# Patient Record
Sex: FEMALE | Race: WHITE | Employment: OTHER | URBAN - NONMETROPOLITAN AREA
[De-identification: names, ages, dates, MRNs, and addresses within clinical notes are randomized per-mention and may not be internally consistent; named-entity substitution may affect disease eponyms.]

---

## 2017-01-11 ENCOUNTER — ANTI-COAG VISIT (OUTPATIENT)
Dept: CARDIOLOGY | Age: 57
End: 2017-01-11
Payer: MEDICARE

## 2017-01-11 DIAGNOSIS — I48.0 PAROXYSMAL ATRIAL FIBRILLATION (HCC): Primary | ICD-10-CM

## 2017-01-11 LAB
INTERNATIONAL NORMALIZATION RATIO, POC: 3.2
PROTHROMBIN TIME, POC: NORMAL

## 2017-01-11 PROCEDURE — 85610 PROTHROMBIN TIME: CPT | Performed by: CLINICAL NURSE SPECIALIST

## 2017-02-23 ENCOUNTER — ANTI-COAG VISIT (OUTPATIENT)
Dept: CARDIOLOGY | Age: 57
End: 2017-02-23
Payer: MEDICARE

## 2017-02-23 DIAGNOSIS — I48.0 PAROXYSMAL ATRIAL FIBRILLATION (HCC): Primary | ICD-10-CM

## 2017-02-23 LAB
INTERNATIONAL NORMALIZATION RATIO, POC: 2.5
PROTHROMBIN TIME, POC: NORMAL

## 2017-02-23 PROCEDURE — 85610 PROTHROMBIN TIME: CPT | Performed by: NURSE PRACTITIONER

## 2017-02-23 PROCEDURE — 1036F TOBACCO NON-USER: CPT | Performed by: NURSE PRACTITIONER

## 2017-04-06 ENCOUNTER — ANTI-COAG VISIT (OUTPATIENT)
Dept: CARDIOLOGY | Age: 57
End: 2017-04-06
Payer: MEDICARE

## 2017-04-06 DIAGNOSIS — I48.0 PAROXYSMAL ATRIAL FIBRILLATION (HCC): Primary | ICD-10-CM

## 2017-04-06 LAB
INTERNATIONAL NORMALIZATION RATIO, POC: 4
PROTHROMBIN TIME, POC: NORMAL

## 2017-04-06 PROCEDURE — 1036F TOBACCO NON-USER: CPT | Performed by: CLINICAL NURSE SPECIALIST

## 2017-04-06 PROCEDURE — 85610 PROTHROMBIN TIME: CPT | Performed by: CLINICAL NURSE SPECIALIST

## 2017-04-20 ENCOUNTER — ANTI-COAG VISIT (OUTPATIENT)
Dept: CARDIOLOGY | Age: 57
End: 2017-04-20
Payer: MEDICARE

## 2017-04-20 DIAGNOSIS — I48.0 PAROXYSMAL ATRIAL FIBRILLATION (HCC): Primary | ICD-10-CM

## 2017-04-20 LAB
INTERNATIONAL NORMALIZATION RATIO, POC: 2.1
PROTHROMBIN TIME, POC: NORMAL

## 2017-04-20 PROCEDURE — 85610 PROTHROMBIN TIME: CPT | Performed by: CLINICAL NURSE SPECIALIST

## 2017-04-20 PROCEDURE — 1036F TOBACCO NON-USER: CPT | Performed by: CLINICAL NURSE SPECIALIST

## 2017-05-19 ENCOUNTER — OFFICE VISIT (OUTPATIENT)
Dept: RADIATION ONCOLOGY | Facility: HOSPITAL | Age: 57
End: 2017-05-19

## 2017-05-19 VITALS
WEIGHT: 277 LBS | BODY MASS INDEX: 39.65 KG/M2 | SYSTOLIC BLOOD PRESSURE: 132 MMHG | DIASTOLIC BLOOD PRESSURE: 80 MMHG | HEIGHT: 70 IN

## 2017-05-19 DIAGNOSIS — Z92.3 S/P RADIATION THERAPY: ICD-10-CM

## 2017-05-19 DIAGNOSIS — C34.92 SMALL CELL LUNG CANCER, LEFT (HCC): Primary | ICD-10-CM

## 2017-05-22 ENCOUNTER — OFFICE VISIT (OUTPATIENT)
Dept: CARDIOLOGY | Age: 57
End: 2017-05-22
Payer: MEDICARE

## 2017-05-22 VITALS
HEIGHT: 70 IN | OXYGEN SATURATION: 96 % | DIASTOLIC BLOOD PRESSURE: 84 MMHG | WEIGHT: 278.13 LBS | SYSTOLIC BLOOD PRESSURE: 138 MMHG | HEART RATE: 62 BPM | BODY MASS INDEX: 39.82 KG/M2

## 2017-05-22 DIAGNOSIS — I48.0 PAROXYSMAL ATRIAL FIBRILLATION (HCC): Primary | ICD-10-CM

## 2017-05-22 DIAGNOSIS — I10 ESSENTIAL HYPERTENSION: ICD-10-CM

## 2017-05-22 LAB
INTERNATIONAL NORMALIZATION RATIO, POC: 1.9
PROTHROMBIN TIME, POC: NORMAL

## 2017-05-22 PROCEDURE — G8427 DOCREV CUR MEDS BY ELIG CLIN: HCPCS | Performed by: CLINICAL NURSE SPECIALIST

## 2017-05-22 PROCEDURE — 1036F TOBACCO NON-USER: CPT | Performed by: CLINICAL NURSE SPECIALIST

## 2017-05-22 PROCEDURE — G8417 CALC BMI ABV UP PARAM F/U: HCPCS | Performed by: CLINICAL NURSE SPECIALIST

## 2017-05-22 PROCEDURE — 99213 OFFICE O/P EST LOW 20 MIN: CPT | Performed by: CLINICAL NURSE SPECIALIST

## 2017-05-22 PROCEDURE — 3017F COLORECTAL CA SCREEN DOC REV: CPT | Performed by: CLINICAL NURSE SPECIALIST

## 2017-05-22 PROCEDURE — 3014F SCREEN MAMMO DOC REV: CPT | Performed by: CLINICAL NURSE SPECIALIST

## 2017-05-22 PROCEDURE — 85610 PROTHROMBIN TIME: CPT | Performed by: CLINICAL NURSE SPECIALIST

## 2017-05-22 PROCEDURE — 93000 ELECTROCARDIOGRAM COMPLETE: CPT | Performed by: CLINICAL NURSE SPECIALIST

## 2017-05-22 ASSESSMENT — ENCOUNTER SYMPTOMS
HEARTBURN: 0
NAUSEA: 0
SHORTNESS OF BREATH: 1
BLURRED VISION: 0
ABDOMINAL PAIN: 0
VOMITING: 0
ORTHOPNEA: 0
COUGH: 0
BLOOD IN STOOL: 0

## 2017-05-26 PROBLEM — E66.01 MORBID OBESITY DUE TO EXCESS CALORIES (HCC): Status: ACTIVE | Noted: 2017-05-26

## 2017-06-22 ENCOUNTER — ANTI-COAG VISIT (OUTPATIENT)
Dept: CARDIOLOGY | Age: 57
End: 2017-06-22
Payer: MEDICARE

## 2017-06-22 DIAGNOSIS — I48.0 PAROXYSMAL ATRIAL FIBRILLATION (HCC): Primary | ICD-10-CM

## 2017-06-22 LAB
INTERNATIONAL NORMALIZATION RATIO, POC: 2.1
PROTHROMBIN TIME, POC: NORMAL

## 2017-06-22 PROCEDURE — 1036F TOBACCO NON-USER: CPT | Performed by: CLINICAL NURSE SPECIALIST

## 2017-06-22 PROCEDURE — 85610 PROTHROMBIN TIME: CPT | Performed by: CLINICAL NURSE SPECIALIST

## 2017-07-14 ENCOUNTER — TELEPHONE (OUTPATIENT)
Dept: CARDIOLOGY | Age: 57
End: 2017-07-14

## 2017-08-04 ENCOUNTER — ANTI-COAG VISIT (OUTPATIENT)
Dept: CARDIOLOGY | Age: 57
End: 2017-08-04
Payer: MEDICARE

## 2017-08-04 DIAGNOSIS — I48.0 PAROXYSMAL ATRIAL FIBRILLATION (HCC): Primary | ICD-10-CM

## 2017-08-04 LAB
INTERNATIONAL NORMALIZATION RATIO, POC: 2.4
PROTHROMBIN TIME, POC: NORMAL

## 2017-08-04 PROCEDURE — 1036F TOBACCO NON-USER: CPT | Performed by: CLINICAL NURSE SPECIALIST

## 2017-08-04 PROCEDURE — 85610 PROTHROMBIN TIME: CPT | Performed by: CLINICAL NURSE SPECIALIST

## 2017-09-22 ENCOUNTER — ANTI-COAG VISIT (OUTPATIENT)
Dept: CARDIOLOGY | Age: 57
End: 2017-09-22
Payer: MEDICARE

## 2017-09-22 DIAGNOSIS — I48.0 PAROXYSMAL ATRIAL FIBRILLATION (HCC): Primary | ICD-10-CM

## 2017-09-22 LAB
INTERNATIONAL NORMALIZATION RATIO, POC: 2.1
PROTHROMBIN TIME, POC: NORMAL

## 2017-09-22 PROCEDURE — 85610 PROTHROMBIN TIME: CPT | Performed by: CLINICAL NURSE SPECIALIST

## 2017-09-22 PROCEDURE — 1036F TOBACCO NON-USER: CPT | Performed by: CLINICAL NURSE SPECIALIST

## 2017-09-25 ENCOUNTER — TELEPHONE (OUTPATIENT)
Dept: CARDIOLOGY | Age: 57
End: 2017-09-25

## 2017-10-29 DIAGNOSIS — I48.91 ATRIAL FIBRILLATION, CONTROLLED (HCC): ICD-10-CM

## 2017-10-30 RX ORDER — WARFARIN SODIUM 5 MG/1
TABLET ORAL
Qty: 45 TABLET | Refills: 5 | Status: SHIPPED | OUTPATIENT
Start: 2017-10-30 | End: 2018-11-18 | Stop reason: SDUPTHER

## 2017-11-03 ENCOUNTER — ANTI-COAG VISIT (OUTPATIENT)
Dept: CARDIOLOGY | Age: 57
End: 2017-11-03
Payer: MEDICARE

## 2017-11-03 DIAGNOSIS — I48.0 PAROXYSMAL ATRIAL FIBRILLATION (HCC): Primary | ICD-10-CM

## 2017-11-03 LAB
INTERNATIONAL NORMALIZATION RATIO, POC: 2.7
PROTHROMBIN TIME, POC: NORMAL

## 2017-11-03 PROCEDURE — 85610 PROTHROMBIN TIME: CPT | Performed by: CLINICAL NURSE SPECIALIST

## 2017-11-21 ENCOUNTER — OFFICE VISIT (OUTPATIENT)
Dept: CARDIOLOGY | Age: 57
End: 2017-11-21
Payer: MEDICARE

## 2017-11-21 VITALS
SYSTOLIC BLOOD PRESSURE: 130 MMHG | HEART RATE: 64 BPM | BODY MASS INDEX: 39.51 KG/M2 | HEIGHT: 70 IN | WEIGHT: 276 LBS | DIASTOLIC BLOOD PRESSURE: 74 MMHG

## 2017-11-21 DIAGNOSIS — I48.0 PAROXYSMAL ATRIAL FIBRILLATION (HCC): ICD-10-CM

## 2017-11-21 DIAGNOSIS — I10 ESSENTIAL HYPERTENSION: Primary | ICD-10-CM

## 2017-11-21 LAB
INTERNATIONAL NORMALIZATION RATIO, POC: 2.6
PROTHROMBIN TIME, POC: NORMAL

## 2017-11-21 PROCEDURE — G8484 FLU IMMUNIZE NO ADMIN: HCPCS | Performed by: NURSE PRACTITIONER

## 2017-11-21 PROCEDURE — 3017F COLORECTAL CA SCREEN DOC REV: CPT | Performed by: NURSE PRACTITIONER

## 2017-11-21 PROCEDURE — G8417 CALC BMI ABV UP PARAM F/U: HCPCS | Performed by: NURSE PRACTITIONER

## 2017-11-21 PROCEDURE — 93000 ELECTROCARDIOGRAM COMPLETE: CPT | Performed by: NURSE PRACTITIONER

## 2017-11-21 PROCEDURE — 3014F SCREEN MAMMO DOC REV: CPT | Performed by: NURSE PRACTITIONER

## 2017-11-21 PROCEDURE — 1036F TOBACCO NON-USER: CPT | Performed by: NURSE PRACTITIONER

## 2017-11-21 PROCEDURE — 85610 PROTHROMBIN TIME: CPT | Performed by: NURSE PRACTITIONER

## 2017-11-21 PROCEDURE — G8427 DOCREV CUR MEDS BY ELIG CLIN: HCPCS | Performed by: NURSE PRACTITIONER

## 2017-11-21 PROCEDURE — 99213 OFFICE O/P EST LOW 20 MIN: CPT | Performed by: NURSE PRACTITIONER

## 2017-11-21 RX ORDER — PROMETHAZINE HYDROCHLORIDE 25 MG/1
25 TABLET ORAL EVERY 6 HOURS PRN
COMMUNITY

## 2017-11-21 RX ORDER — POTASSIUM CHLORIDE 7.45 MG/ML
10 INJECTION INTRAVENOUS ONCE
Status: ON HOLD | COMMUNITY
End: 2019-02-20 | Stop reason: ALTCHOICE

## 2017-11-21 RX ORDER — FUROSEMIDE 20 MG/1
20 TABLET ORAL DAILY PRN
COMMUNITY

## 2017-11-21 NOTE — PROGRESS NOTES
Dear Luisa Wright,* & Santana Ocampo, DO,    Thank you for allowing me to participate in the care of Ms. Binu Parker. She presents today at the [address] [place] [address] in the [facility]. As you know, Ms. Montrell Martin is a 61 y.o. female with history of hypertension, hyperlipidemia, PAF, DVT, COPD and chronic anticoagulation who presents with the chief complaint of yearly follow up and INR check. She is a patient of Dr. Matthieu Arellano. Anticoagulation  Patient is here for anticoagulation follow-up. Indication: atrial fibrillation and DVT  Bleeding Signs/Symptoms:  None  Thromboembolic Signs/Symptoms:  None    Missed Coumadin Doses:  None  Medication Changes:  no  Dietary Changes:  no  Bacterial/Viral Infection:  No    Alternates 5mg T,TH,Sun and then 2.5 mg on other days. INR 2.6 today     Other Concerns:  no    Arrhythmia  Patient presents for evaluation of PAF. Patient also complains of none. The patient denies chest pain, cough, fatigue, palpitations, rapid heart beat, shortness of breath and syncope. The patient has a past history of atrial fibrillation. The patient denies a past history of AICD, CABG, CAD, CHF and pacemaker. SOB chronic x 2 years no worse   Dizziness -worse if gets up to fast or has to step up with one leg, laying down makes it better. Hypertension  Patient is here for follow-up of elevated blood pressure. Her BP has been under control. She otherwise denies chest pain, PND, orthopnea, syncope or near syncope. She has no other complaints. Review of Systems    Constitutional: Negative for fever, chills, diaphoresis, activity change, appetite change, fatigue and unexpected weight change. Eyes: Negative for photophobia, pain, redness and visual disturbance. Respiratory: Negative for apnea, cough, chest tightness, + shortness of breath-chronic no change, wheezing and stridor.     Cardiovascular: Negative for celecoxib (CELEBREX) 200 MG capsule, Take 200 mg by mouth daily. , Disp: , Rfl:     LORazepam (ATIVAN) 1 MG tablet, Take 1 mg by mouth 3 times daily , Disp: , Rfl:     losartan (COZAAR) 100 MG tablet, Take 100 mg by mouth daily. , Disp: , Rfl:     temazepam (RESTORIL) 30 MG capsule, Take 30 mg by mouth nightly as needed. , Disp: , Rfl:     Cyanocobalamin (VITAMIN B-12 CR PO),  Take by mouth daily , Disp: , Rfl:     tizanidine (ZANAFLEX) 4 MG tablet, Take 4 mg by mouth every 6 hours as needed. 3 tabs , Disp: , Rfl:     Polyethylene Glycol 3350 (MIRALAX PO), Take  by mouth.  , Disp: , Rfl:     PE:  Vitals:    11/21/17 1058   BP: 130/74   Pulse: 64       Estimated body mass index is 39.6 kg/m² as calculated from the following:    Height as of this encounter: 5' 10\" (1.778 m). Weight as of this encounter: 276 lb (125.2 kg). Constitutional: She is oriented to person, place, and time. She appears well-developed and well-nourished in no acute distress. Head: Normocephalic and atraumatic. Neck:  Neck supple without JVD present. Cardiovascular: Normal rate, regular rhythm, normal heart sounds. no murmur ascultated. No gallop and no friction rub.  no carotid bruits. no peripheral edema. Pulmonary/Chest:  Lungs clear to auscultation bilaterally without evidence of respiratory distress. She without wheezes. She without rales or ronchi. Musculoskeletal: Normal range of motion. Gait is normal no assitive device. Neurological: She is alert and oriented to person, place, and time. Skin: Skin is warm and dry without rash or pallor. Psychiatric: She has a normal mood and affect.  Her behavior is normal. Thought content normal.     Lab Results   Component Value Date    CREATININE 0.7 02/09/2015    CREATININE 0.7 05/07/2014    CREATININE 0.7 05/05/2014    CREATININE 0.6 10/11/2011    CREATININE 1.1 03/05/2011    CREATININE 1.3 03/03/2011    HGB 13.6 05/07/2014    HGB 14.9 05/05/2014    HGB 14.2 10/16/2013 ECG 11/21/17  Normal sinus rhythm and 63 BPM    TTE    Cath    Assessment    1. Essential hypertension    2. Paroxysmal atrial fibrillation (HCC)          Plan:    Continue current medications as prescribed. Stable Cardiovascular status    Disposition - RTC in 6 months or sooner if needed  Keep already scheduled PT/INR check in Dec.     Please do not hesitate to contact me for any questions or concerns.     Sincerely yours,    Db Pineda, APRN

## 2017-12-13 DIAGNOSIS — I48.91 ATRIAL FIBRILLATION, UNSPECIFIED TYPE (HCC): ICD-10-CM

## 2017-12-13 RX ORDER — SOTALOL HYDROCHLORIDE 160 MG/1
TABLET ORAL
Qty: 180 TABLET | Refills: 3 | Status: SHIPPED | OUTPATIENT
Start: 2017-12-13 | End: 2018-11-28 | Stop reason: SDUPTHER

## 2017-12-15 ENCOUNTER — ANTI-COAG VISIT (OUTPATIENT)
Dept: CARDIOLOGY | Age: 57
End: 2017-12-15
Payer: MEDICARE

## 2017-12-15 DIAGNOSIS — I48.0 PAROXYSMAL ATRIAL FIBRILLATION (HCC): Primary | ICD-10-CM

## 2017-12-15 LAB
INTERNATIONAL NORMALIZATION RATIO, POC: 4.7
PROTHROMBIN TIME, POC: NORMAL

## 2017-12-15 PROCEDURE — 85610 PROTHROMBIN TIME: CPT | Performed by: CLINICAL NURSE SPECIALIST

## 2017-12-28 ENCOUNTER — ANTI-COAG VISIT (OUTPATIENT)
Dept: CARDIOLOGY | Age: 57
End: 2017-12-28
Payer: MEDICARE

## 2017-12-28 DIAGNOSIS — I48.0 PAROXYSMAL ATRIAL FIBRILLATION (HCC): Primary | ICD-10-CM

## 2017-12-28 LAB
INTERNATIONAL NORMALIZATION RATIO, POC: 3.3
PROTHROMBIN TIME, POC: NORMAL

## 2017-12-28 PROCEDURE — 85610 PROTHROMBIN TIME: CPT | Performed by: NURSE PRACTITIONER

## 2018-01-01 ENCOUNTER — APPOINTMENT (OUTPATIENT)
Dept: GENERAL RADIOLOGY | Facility: HOSPITAL | Age: 58
End: 2018-01-01

## 2018-01-01 ENCOUNTER — HOSPITAL ENCOUNTER (OUTPATIENT)
Facility: HOSPITAL | Age: 58
Setting detail: HOSPITAL OUTPATIENT SURGERY
Discharge: HOME | End: 2018-05-21
Attending: UROLOGY | Admitting: UROLOGY

## 2018-01-01 ENCOUNTER — HOSPITAL ENCOUNTER (OUTPATIENT)
Dept: ULTRASOUND IMAGING | Facility: HOSPITAL | Age: 58
Discharge: HOME | End: 2018-07-02
Attending: UROLOGY | Admitting: UROLOGY

## 2018-01-01 ENCOUNTER — OFFICE VISIT (OUTPATIENT)
Dept: UROLOGY | Facility: CLINIC | Age: 58
End: 2018-01-01

## 2018-01-01 ENCOUNTER — PROCEDURE VISIT (OUTPATIENT)
Dept: UROLOGY | Facility: CLINIC | Age: 58
End: 2018-01-01

## 2018-01-01 ENCOUNTER — APPOINTMENT (OUTPATIENT)
Dept: PREADMISSION TESTING | Facility: HOSPITAL | Age: 58
End: 2018-01-01

## 2018-01-01 ENCOUNTER — OFFICE VISIT (OUTPATIENT)
Dept: PULMONOLOGY | Facility: CLINIC | Age: 58
End: 2018-01-01

## 2018-01-01 ENCOUNTER — TELEPHONE (OUTPATIENT)
Dept: UROLOGY | Facility: CLINIC | Age: 58
End: 2018-01-01

## 2018-01-01 ENCOUNTER — HOSPITAL ENCOUNTER (OUTPATIENT)
Dept: RADIATION ONCOLOGY | Facility: HOSPITAL | Age: 58
Setting detail: RADIATION/ONCOLOGY SERIES
End: 2018-01-01

## 2018-01-01 ENCOUNTER — ANESTHESIA (OUTPATIENT)
Dept: PERIOP | Facility: HOSPITAL | Age: 58
End: 2018-01-01

## 2018-01-01 ENCOUNTER — OFFICE VISIT (OUTPATIENT)
Dept: RADIATION ONCOLOGY | Facility: HOSPITAL | Age: 58
End: 2018-01-01

## 2018-01-01 ENCOUNTER — ANESTHESIA EVENT (OUTPATIENT)
Dept: PERIOP | Facility: HOSPITAL | Age: 58
End: 2018-01-01

## 2018-01-01 VITALS
DIASTOLIC BLOOD PRESSURE: 82 MMHG | HEIGHT: 70 IN | SYSTOLIC BLOOD PRESSURE: 132 MMHG | BODY MASS INDEX: 38.8 KG/M2 | WEIGHT: 271 LBS

## 2018-01-01 VITALS
RESPIRATION RATE: 18 BRPM | SYSTOLIC BLOOD PRESSURE: 150 MMHG | OXYGEN SATURATION: 95 % | HEART RATE: 54 BPM | TEMPERATURE: 97.4 F | DIASTOLIC BLOOD PRESSURE: 94 MMHG

## 2018-01-01 VITALS
SYSTOLIC BLOOD PRESSURE: 136 MMHG | WEIGHT: 266 LBS | DIASTOLIC BLOOD PRESSURE: 84 MMHG | HEART RATE: 68 BPM | HEIGHT: 70 IN | OXYGEN SATURATION: 96 % | BODY MASS INDEX: 38.08 KG/M2

## 2018-01-01 VITALS
WEIGHT: 276 LBS | HEIGHT: 70 IN | BODY MASS INDEX: 39.51 KG/M2 | DIASTOLIC BLOOD PRESSURE: 82 MMHG | SYSTOLIC BLOOD PRESSURE: 118 MMHG | TEMPERATURE: 98 F

## 2018-01-01 VITALS
BODY MASS INDEX: 38.6 KG/M2 | DIASTOLIC BLOOD PRESSURE: 73 MMHG | WEIGHT: 269.62 LBS | RESPIRATION RATE: 20 BRPM | HEART RATE: 60 BPM | OXYGEN SATURATION: 95 % | HEIGHT: 70 IN | SYSTOLIC BLOOD PRESSURE: 151 MMHG

## 2018-01-01 VITALS — WEIGHT: 275.4 LBS | BODY MASS INDEX: 39.43 KG/M2 | TEMPERATURE: 97.7 F | HEIGHT: 70 IN

## 2018-01-01 DIAGNOSIS — J44.9 CHRONIC OBSTRUCTIVE PULMONARY DISEASE, UNSPECIFIED COPD TYPE (HCC): Primary | ICD-10-CM

## 2018-01-01 DIAGNOSIS — N20.0 NEPHROLITHIASIS: Primary | ICD-10-CM

## 2018-01-01 DIAGNOSIS — R31.0 GROSS HEMATURIA: Primary | ICD-10-CM

## 2018-01-01 DIAGNOSIS — N39.0 URINARY TRACT INFECTION WITHOUT HEMATURIA, SITE UNSPECIFIED: Primary | ICD-10-CM

## 2018-01-01 DIAGNOSIS — N20.1 URETEROLITHIASIS: ICD-10-CM

## 2018-01-01 DIAGNOSIS — N20.0 NEPHROLITHIASIS: ICD-10-CM

## 2018-01-01 DIAGNOSIS — Z87.891 FORMER SMOKER: ICD-10-CM

## 2018-01-01 DIAGNOSIS — Z08 ENCOUNTER FOR FOLLOW-UP SURVEILLANCE OF LUNG CANCER: ICD-10-CM

## 2018-01-01 DIAGNOSIS — Z92.3 S/P RADIATION THERAPY: ICD-10-CM

## 2018-01-01 DIAGNOSIS — Z87.891 PERSONAL HISTORY OF NICOTINE DEPENDENCE: ICD-10-CM

## 2018-01-01 DIAGNOSIS — C34.90 SMALL CELL LUNG CANCER (HCC): ICD-10-CM

## 2018-01-01 DIAGNOSIS — G89.4 CHRONIC PAIN SYNDROME: ICD-10-CM

## 2018-01-01 DIAGNOSIS — I50.22 CHRONIC SYSTOLIC (CONGESTIVE) HEART FAILURE (HCC): ICD-10-CM

## 2018-01-01 DIAGNOSIS — C34.92 SMALL CELL CARCINOMA OF LEFT LUNG (HCC): Primary | ICD-10-CM

## 2018-01-01 DIAGNOSIS — J30.1 SEASONAL ALLERGIC RHINITIS DUE TO POLLEN: ICD-10-CM

## 2018-01-01 DIAGNOSIS — R31.0 GROSS HEMATURIA: ICD-10-CM

## 2018-01-01 DIAGNOSIS — N20.1 URETEROLITHIASIS: Primary | ICD-10-CM

## 2018-01-01 DIAGNOSIS — Z85.118 ENCOUNTER FOR FOLLOW-UP SURVEILLANCE OF LUNG CANCER: ICD-10-CM

## 2018-01-01 LAB
ANION GAP SERPL CALCULATED.3IONS-SCNC: 11 MMOL/L (ref 4–13)
BACTERIA SPEC AEROBE CULT: ABNORMAL
BACTERIA UR QL AUTO: ABNORMAL /HPF
BILIRUB BLD-MCNC: ABNORMAL MG/DL
BILIRUB BLD-MCNC: NEGATIVE MG/DL
BILIRUB BLD-MCNC: NEGATIVE MG/DL
BILIRUB UR QL STRIP: NEGATIVE
BUN BLD-MCNC: 17 MG/DL (ref 5–21)
BUN/CREAT SERPL: 31.5 (ref 7–25)
CALCIUM SPEC-SCNC: 9.1 MG/DL (ref 8.4–10.4)
CHLORIDE SERPL-SCNC: 103 MMOL/L (ref 98–110)
CLARITY UR: ABNORMAL
CLARITY, POC: CLEAR
CO2 SERPL-SCNC: 27 MMOL/L (ref 24–31)
COLOR UR: ABNORMAL
COLOR UR: YELLOW
CREAT BLD-MCNC: 0.54 MG/DL (ref 0.5–1.4)
CYTO UR: NORMAL
DEPRECATED RDW RBC AUTO: 44.3 FL (ref 40–54)
ERYTHROCYTE [DISTWIDTH] IN BLOOD BY AUTOMATED COUNT: 13.5 % (ref 12–15)
FEV1/FVC: NORMAL %
FEV1: NORMAL LITERS
FVC VOL RESPIRATORY: NORMAL LITERS
GFR SERPL CREATININE-BSD FRML MDRD: 116 ML/MIN/1.73
GLUCOSE BLD-MCNC: 105 MG/DL (ref 70–100)
GLUCOSE UR STRIP-MCNC: NEGATIVE MG/DL
HCT VFR BLD AUTO: 40.4 % (ref 37–47)
HGB BLD-MCNC: 13.6 G/DL (ref 12–16)
HGB UR QL STRIP.AUTO: ABNORMAL
HYALINE CASTS UR QL AUTO: ABNORMAL /LPF
INR PPP: 2.56 (ref 0.91–1.09)
KETONES UR QL STRIP: ABNORMAL
KETONES UR QL: ABNORMAL
KETONES UR QL: NEGATIVE
KETONES UR QL: NEGATIVE
LAB AP CASE REPORT: NORMAL
LAB AP CASE REPORT: NORMAL
LEUKOCYTE EST, POC: NEGATIVE
LEUKOCYTE ESTERASE UR QL STRIP.AUTO: ABNORMAL
Lab: NORMAL
Lab: NORMAL
MCH RBC QN AUTO: 30 PG (ref 28–32)
MCHC RBC AUTO-ENTMCNC: 33.7 G/DL (ref 33–36)
MCV RBC AUTO: 89 FL (ref 82–98)
NITRITE UR QL STRIP: NEGATIVE
NITRITE UR-MCNC: NEGATIVE MG/ML
PATH REPORT.FINAL DX SPEC: NORMAL
PATH REPORT.FINAL DX SPEC: NORMAL
PATH REPORT.GROSS SPEC: NORMAL
PATH REPORT.GROSS SPEC: NORMAL
PH UR STRIP.AUTO: <=5 [PH] (ref 5–8)
PH UR: 5 [PH] (ref 5–8)
PH UR: 5 [PH] (ref 5–8)
PH UR: 6 [PH] (ref 5–8)
PLATELET # BLD AUTO: 216 10*3/MM3 (ref 130–400)
PMV BLD AUTO: 11.4 FL (ref 6–12)
POTASSIUM BLD-SCNC: 4.6 MMOL/L (ref 3.5–5.3)
PROT UR QL STRIP: ABNORMAL
PROT UR STRIP-MCNC: ABNORMAL MG/DL
PROT UR STRIP-MCNC: ABNORMAL MG/DL
PROT UR STRIP-MCNC: NEGATIVE MG/DL
PROTHROMBIN TIME: 28.5 SECONDS (ref 11.9–14.6)
RBC # BLD AUTO: 4.54 10*6/MM3 (ref 4.2–5.4)
RBC # UR STRIP: ABNORMAL /UL
RBC # UR: ABNORMAL /HPF
REF LAB TEST METHOD: ABNORMAL
SODIUM BLD-SCNC: 141 MMOL/L (ref 135–145)
SP GR UR STRIP: 1.03 (ref 1–1.03)
SP GR UR: 1.01 (ref 1–1.03)
SP GR UR: 1.03 (ref 1–1.03)
SP GR UR: 1.03 (ref 1–1.03)
SQUAMOUS #/AREA URNS HPF: ABNORMAL /HPF
UROBILINOGEN UR QL STRIP: ABNORMAL
UROBILINOGEN UR QL: NORMAL
WBC NRBC COR # BLD: 6.29 10*3/MM3 (ref 4.8–10.8)
WBC UR QL AUTO: ABNORMAL /HPF

## 2018-01-01 PROCEDURE — 80048 BASIC METABOLIC PNL TOTAL CA: CPT | Performed by: UROLOGY

## 2018-01-01 PROCEDURE — 81001 URINALYSIS AUTO W/SCOPE: CPT | Performed by: UROLOGY

## 2018-01-01 PROCEDURE — 25010000002 LEVOFLOXACIN PER 250 MG: Performed by: UROLOGY

## 2018-01-01 PROCEDURE — 85027 COMPLETE CBC AUTOMATED: CPT | Performed by: UROLOGY

## 2018-01-01 PROCEDURE — C1894 INTRO/SHEATH, NON-LASER: HCPCS | Performed by: UROLOGY

## 2018-01-01 PROCEDURE — 25010000002 MIDAZOLAM PER 1 MG: Performed by: ANESTHESIOLOGY

## 2018-01-01 PROCEDURE — C2617 STENT, NON-COR, TEM W/O DEL: HCPCS | Performed by: UROLOGY

## 2018-01-01 PROCEDURE — 99211 OFF/OP EST MAY X REQ PHY/QHP: CPT | Performed by: UROLOGY

## 2018-01-01 PROCEDURE — 99213 OFFICE O/P EST LOW 20 MIN: CPT | Performed by: UROLOGY

## 2018-01-01 PROCEDURE — 93010 ELECTROCARDIOGRAM REPORT: CPT | Performed by: INTERNAL MEDICINE

## 2018-01-01 PROCEDURE — 25010000002 FENTANYL CITRATE (PF) 250 MCG/5ML SOLUTION: Performed by: NURSE ANESTHETIST, CERTIFIED REGISTERED

## 2018-01-01 PROCEDURE — 25010000002 PROPOFOL 10 MG/ML EMULSION: Performed by: NURSE ANESTHETIST, CERTIFIED REGISTERED

## 2018-01-01 PROCEDURE — 87186 SC STD MICRODIL/AGAR DIL: CPT | Performed by: UROLOGY

## 2018-01-01 PROCEDURE — 25010000002 NEOSTIGMINE PER 0.5 MG: Performed by: NURSE ANESTHETIST, CERTIFIED REGISTERED

## 2018-01-01 PROCEDURE — 87077 CULTURE AEROBIC IDENTIFY: CPT | Performed by: UROLOGY

## 2018-01-01 PROCEDURE — 87086 URINE CULTURE/COLONY COUNT: CPT | Performed by: UROLOGY

## 2018-01-01 PROCEDURE — 25010000002 SUCCINYLCHOLINE PER 20 MG: Performed by: NURSE ANESTHETIST, CERTIFIED REGISTERED

## 2018-01-01 PROCEDURE — C1769 GUIDE WIRE: HCPCS | Performed by: UROLOGY

## 2018-01-01 PROCEDURE — 74420 UROGRAPHY RTRGR +-KUB: CPT | Performed by: UROLOGY

## 2018-01-01 PROCEDURE — 82360 CALCULUS ASSAY QUANT: CPT | Performed by: UROLOGY

## 2018-01-01 PROCEDURE — C1758 CATHETER, URETERAL: HCPCS | Performed by: UROLOGY

## 2018-01-01 PROCEDURE — 88112 CYTOPATH CELL ENHANCE TECH: CPT | Performed by: UROLOGY

## 2018-01-01 PROCEDURE — 74420 UROGRAPHY RTRGR +-KUB: CPT

## 2018-01-01 PROCEDURE — 25010000002 DEXAMETHASONE PER 1 MG: Performed by: ANESTHESIOLOGY

## 2018-01-01 PROCEDURE — 99204 OFFICE O/P NEW MOD 45 MIN: CPT | Performed by: UROLOGY

## 2018-01-01 PROCEDURE — 99214 OFFICE O/P EST MOD 30 MIN: CPT | Performed by: NURSE PRACTITIONER

## 2018-01-01 PROCEDURE — 52356 CYSTO/URETERO W/LITHOTRIPSY: CPT | Performed by: UROLOGY

## 2018-01-01 PROCEDURE — 93005 ELECTROCARDIOGRAM TRACING: CPT

## 2018-01-01 PROCEDURE — 94010 BREATHING CAPACITY TEST: CPT | Performed by: NURSE PRACTITIONER

## 2018-01-01 PROCEDURE — 76775 US EXAM ABDO BACK WALL LIM: CPT

## 2018-01-01 PROCEDURE — G0463 HOSPITAL OUTPT CLINIC VISIT: HCPCS | Performed by: RADIOLOGY

## 2018-01-01 PROCEDURE — 85610 PROTHROMBIN TIME: CPT | Performed by: UROLOGY

## 2018-01-01 PROCEDURE — 52000 CYSTOURETHROSCOPY: CPT | Performed by: UROLOGY

## 2018-01-01 PROCEDURE — 25010000002 IOPAMIDOL 61 % SOLUTION: Performed by: UROLOGY

## 2018-01-01 PROCEDURE — 88300 SURGICAL PATH GROSS: CPT | Performed by: UROLOGY

## 2018-01-01 DEVICE — URETERAL STENT
Type: IMPLANTABLE DEVICE | Site: URETER | Status: FUNCTIONAL
Brand: PERCUFLEX™ PLUS

## 2018-01-01 RX ORDER — OXYCODONE AND ACETAMINOPHEN 7.5; 325 MG/1; MG/1
2 TABLET ORAL ONCE AS NEEDED
Status: DISCONTINUED | OUTPATIENT
Start: 2018-01-01 | End: 2018-01-01 | Stop reason: HOSPADM

## 2018-01-01 RX ORDER — SODIUM CHLORIDE 0.9 % (FLUSH) 0.9 %
1-10 SYRINGE (ML) INJECTION AS NEEDED
Status: DISCONTINUED | OUTPATIENT
Start: 2018-01-01 | End: 2018-01-01 | Stop reason: HOSPADM

## 2018-01-01 RX ORDER — METOPROLOL TARTRATE 5 MG/5ML
2 INJECTION INTRAVENOUS ONCE AS NEEDED
Status: COMPLETED | OUTPATIENT
Start: 2018-01-01 | End: 2018-01-01

## 2018-01-01 RX ORDER — MAGNESIUM HYDROXIDE 1200 MG/15ML
LIQUID ORAL AS NEEDED
Status: DISCONTINUED | OUTPATIENT
Start: 2018-01-01 | End: 2018-01-01 | Stop reason: HOSPADM

## 2018-01-01 RX ORDER — SODIUM CHLORIDE, SODIUM LACTATE, POTASSIUM CHLORIDE, CALCIUM CHLORIDE 600; 310; 30; 20 MG/100ML; MG/100ML; MG/100ML; MG/100ML
1000 INJECTION, SOLUTION INTRAVENOUS CONTINUOUS
Status: DISCONTINUED | OUTPATIENT
Start: 2018-01-01 | End: 2018-01-01 | Stop reason: HOSPADM

## 2018-01-01 RX ORDER — TAMSULOSIN HYDROCHLORIDE 0.4 MG/1
1 CAPSULE ORAL NIGHTLY
Qty: 14 CAPSULE | Refills: 0 | Status: SHIPPED | OUTPATIENT
Start: 2018-01-01 | End: 2018-01-01

## 2018-01-01 RX ORDER — MEPERIDINE HYDROCHLORIDE 50 MG/ML
12.5 INJECTION INTRAMUSCULAR; INTRAVENOUS; SUBCUTANEOUS
Status: DISCONTINUED | OUTPATIENT
Start: 2018-01-01 | End: 2018-01-01 | Stop reason: HOSPADM

## 2018-01-01 RX ORDER — GLYCOPYRROLATE 0.2 MG/ML
INJECTION INTRAMUSCULAR; INTRAVENOUS AS NEEDED
Status: DISCONTINUED | OUTPATIENT
Start: 2018-01-01 | End: 2018-01-01 | Stop reason: SURG

## 2018-01-01 RX ORDER — ONDANSETRON 4 MG/1
4 TABLET, FILM COATED ORAL ONCE AS NEEDED
Status: DISCONTINUED | OUTPATIENT
Start: 2018-01-01 | End: 2018-01-01 | Stop reason: HOSPADM

## 2018-01-01 RX ORDER — DEXAMETHASONE SODIUM PHOSPHATE 4 MG/ML
4 INJECTION, SOLUTION INTRA-ARTICULAR; INTRALESIONAL; INTRAMUSCULAR; INTRAVENOUS; SOFT TISSUE ONCE AS NEEDED
Status: COMPLETED | OUTPATIENT
Start: 2018-01-01 | End: 2018-01-01

## 2018-01-01 RX ORDER — SOTALOL HYDROCHLORIDE 160 MG/1
160 TABLET ORAL 2 TIMES DAILY
COMMUNITY

## 2018-01-01 RX ORDER — IPRATROPIUM BROMIDE AND ALBUTEROL SULFATE 2.5; .5 MG/3ML; MG/3ML
3 SOLUTION RESPIRATORY (INHALATION) ONCE AS NEEDED
Status: DISCONTINUED | OUTPATIENT
Start: 2018-01-01 | End: 2018-01-01 | Stop reason: HOSPADM

## 2018-01-01 RX ORDER — ROCURONIUM BROMIDE 10 MG/ML
INJECTION, SOLUTION INTRAVENOUS AS NEEDED
Status: DISCONTINUED | OUTPATIENT
Start: 2018-01-01 | End: 2018-01-01 | Stop reason: SURG

## 2018-01-01 RX ORDER — LEVOFLOXACIN 5 MG/ML
500 INJECTION, SOLUTION INTRAVENOUS ONCE
Status: COMPLETED | OUTPATIENT
Start: 2018-01-01 | End: 2018-01-01

## 2018-01-01 RX ORDER — SODIUM CHLORIDE, SODIUM LACTATE, POTASSIUM CHLORIDE, CALCIUM CHLORIDE 600; 310; 30; 20 MG/100ML; MG/100ML; MG/100ML; MG/100ML
100 INJECTION, SOLUTION INTRAVENOUS CONTINUOUS
Status: DISCONTINUED | OUTPATIENT
Start: 2018-01-01 | End: 2018-01-01 | Stop reason: HOSPADM

## 2018-01-01 RX ORDER — SODIUM CHLORIDE 9 MG/ML
100 INJECTION, SOLUTION INTRAVENOUS CONTINUOUS
Status: CANCELLED | OUTPATIENT
Start: 2018-01-01

## 2018-01-01 RX ORDER — OXYCODONE AND ACETAMINOPHEN 10; 325 MG/1; MG/1
1 TABLET ORAL ONCE AS NEEDED
Status: DISCONTINUED | OUTPATIENT
Start: 2018-01-01 | End: 2018-01-01 | Stop reason: HOSPADM

## 2018-01-01 RX ORDER — SODIUM CHLORIDE 9 MG/ML
100 INJECTION, SOLUTION INTRAVENOUS CONTINUOUS
Status: DISCONTINUED | OUTPATIENT
Start: 2018-01-01 | End: 2018-01-01 | Stop reason: HOSPADM

## 2018-01-01 RX ORDER — ONDANSETRON 2 MG/ML
4 INJECTION INTRAMUSCULAR; INTRAVENOUS ONCE AS NEEDED
Status: DISCONTINUED | OUTPATIENT
Start: 2018-01-01 | End: 2018-01-01 | Stop reason: HOSPADM

## 2018-01-01 RX ORDER — PROPOFOL 10 MG/ML
VIAL (ML) INTRAVENOUS AS NEEDED
Status: DISCONTINUED | OUTPATIENT
Start: 2018-01-01 | End: 2018-01-01 | Stop reason: SURG

## 2018-01-01 RX ORDER — SODIUM CHLORIDE 0.9 % (FLUSH) 0.9 %
3 SYRINGE (ML) INJECTION AS NEEDED
Status: DISCONTINUED | OUTPATIENT
Start: 2018-01-01 | End: 2018-01-01 | Stop reason: HOSPADM

## 2018-01-01 RX ORDER — NALOXONE HCL 0.4 MG/ML
0.4 VIAL (ML) INJECTION AS NEEDED
Status: DISCONTINUED | OUTPATIENT
Start: 2018-01-01 | End: 2018-01-01 | Stop reason: HOSPADM

## 2018-01-01 RX ORDER — SUCCINYLCHOLINE CHLORIDE 20 MG/ML
INJECTION INTRAMUSCULAR; INTRAVENOUS AS NEEDED
Status: DISCONTINUED | OUTPATIENT
Start: 2018-01-01 | End: 2018-01-01 | Stop reason: SURG

## 2018-01-01 RX ORDER — FENTANYL CITRATE 50 UG/ML
INJECTION, SOLUTION INTRAMUSCULAR; INTRAVENOUS AS NEEDED
Status: DISCONTINUED | OUTPATIENT
Start: 2018-01-01 | End: 2018-01-01 | Stop reason: SURG

## 2018-01-01 RX ORDER — MIDAZOLAM HYDROCHLORIDE 1 MG/ML
1 INJECTION INTRAMUSCULAR; INTRAVENOUS
Status: DISCONTINUED | OUTPATIENT
Start: 2018-01-01 | End: 2018-01-01 | Stop reason: HOSPADM

## 2018-01-01 RX ORDER — IBUPROFEN 600 MG/1
600 TABLET ORAL ONCE AS NEEDED
Status: DISCONTINUED | OUTPATIENT
Start: 2018-01-01 | End: 2018-01-01 | Stop reason: HOSPADM

## 2018-01-01 RX ORDER — LIDOCAINE HYDROCHLORIDE 40 MG/ML
SOLUTION TOPICAL AS NEEDED
Status: DISCONTINUED | OUTPATIENT
Start: 2018-01-01 | End: 2018-01-01 | Stop reason: SURG

## 2018-01-01 RX ORDER — LIDOCAINE HYDROCHLORIDE 20 MG/ML
INJECTION, SOLUTION INFILTRATION; PERINEURAL AS NEEDED
Status: DISCONTINUED | OUTPATIENT
Start: 2018-01-01 | End: 2018-01-01 | Stop reason: SURG

## 2018-01-01 RX ORDER — MIDAZOLAM HYDROCHLORIDE 1 MG/ML
2 INJECTION INTRAMUSCULAR; INTRAVENOUS
Status: DISCONTINUED | OUTPATIENT
Start: 2018-01-01 | End: 2018-01-01 | Stop reason: HOSPADM

## 2018-01-01 RX ORDER — OXYBUTYNIN CHLORIDE 5 MG/1
5 TABLET ORAL 3 TIMES DAILY
Qty: 40 TABLET | Refills: 0 | Status: SHIPPED | OUTPATIENT
Start: 2018-01-01 | End: 2018-01-01

## 2018-01-01 RX ORDER — IPRATROPIUM BROMIDE AND ALBUTEROL SULFATE 2.5; .5 MG/3ML; MG/3ML
3 SOLUTION RESPIRATORY (INHALATION) ONCE
Status: COMPLETED | OUTPATIENT
Start: 2018-01-01 | End: 2018-01-01

## 2018-01-01 RX ORDER — LABETALOL HYDROCHLORIDE 5 MG/ML
5 INJECTION, SOLUTION INTRAVENOUS
Status: DISCONTINUED | OUTPATIENT
Start: 2018-01-01 | End: 2018-01-01 | Stop reason: HOSPADM

## 2018-01-01 RX ORDER — ACETAMINOPHEN 500 MG
1000 TABLET ORAL ONCE
Status: COMPLETED | OUTPATIENT
Start: 2018-01-01 | End: 2018-01-01

## 2018-01-01 RX ORDER — NITROFURANTOIN MACROCRYSTALS 100 MG/1
100 CAPSULE ORAL 2 TIMES DAILY
Qty: 14 CAPSULE | Refills: 0 | Status: SHIPPED | OUTPATIENT
Start: 2018-01-01 | End: 2018-01-01

## 2018-01-01 RX ORDER — HYDROCODONE BITARTRATE AND ACETAMINOPHEN 7.5; 325 MG/1; MG/1
1 TABLET ORAL ONCE AS NEEDED
Status: DISCONTINUED | OUTPATIENT
Start: 2018-01-01 | End: 2018-01-01 | Stop reason: HOSPADM

## 2018-01-01 RX ORDER — FENTANYL CITRATE 50 UG/ML
25 INJECTION, SOLUTION INTRAMUSCULAR; INTRAVENOUS AS NEEDED
Status: DISCONTINUED | OUTPATIENT
Start: 2018-01-01 | End: 2018-01-01 | Stop reason: HOSPADM

## 2018-01-01 RX ORDER — METOCLOPRAMIDE HYDROCHLORIDE 5 MG/ML
5 INJECTION INTRAMUSCULAR; INTRAVENOUS
Status: DISCONTINUED | OUTPATIENT
Start: 2018-01-01 | End: 2018-01-01 | Stop reason: HOSPADM

## 2018-01-01 RX ADMIN — SODIUM CHLORIDE, POTASSIUM CHLORIDE, SODIUM LACTATE AND CALCIUM CHLORIDE 1000 ML: 600; 310; 30; 20 INJECTION, SOLUTION INTRAVENOUS at 06:40

## 2018-01-01 RX ADMIN — LIDOCAINE HYDROCHLORIDE 0.5 ML: 10 INJECTION, SOLUTION EPIDURAL; INFILTRATION; INTRACAUDAL; PERINEURAL at 06:40

## 2018-01-01 RX ADMIN — GLYCOPYRROLATE 0.2 MG: 0.2 INJECTION, SOLUTION INTRAMUSCULAR; INTRAVENOUS at 08:37

## 2018-01-01 RX ADMIN — PROPOFOL 200 MG: 10 INJECTION, EMULSION INTRAVENOUS at 07:57

## 2018-01-01 RX ADMIN — LIDOCAINE HYDROCHLORIDE 40 MG: 20 INJECTION, SOLUTION INFILTRATION; PERINEURAL at 07:57

## 2018-01-01 RX ADMIN — FENTANYL CITRATE 50 MCG: 50 INJECTION INTRAMUSCULAR; INTRAVENOUS at 07:57

## 2018-01-01 RX ADMIN — Medication 100 MG: at 07:55

## 2018-01-01 RX ADMIN — ACETAMINOPHEN 1000 MG: 500 TABLET ORAL at 07:47

## 2018-01-01 RX ADMIN — IPRATROPIUM BROMIDE AND ALBUTEROL SULFATE 3 ML: 2.5; .5 SOLUTION RESPIRATORY (INHALATION) at 07:47

## 2018-01-01 RX ADMIN — Medication 2 MG: at 08:37

## 2018-01-01 RX ADMIN — GLYCOPYRROLATE 0.2 MG: 0.2 INJECTION, SOLUTION INTRAMUSCULAR; INTRAVENOUS at 07:54

## 2018-01-01 RX ADMIN — DEXAMETHASONE SODIUM PHOSPHATE 4 MG: 4 INJECTION, SOLUTION INTRA-ARTICULAR; INTRALESIONAL; INTRAMUSCULAR; INTRAVENOUS; SOFT TISSUE at 07:47

## 2018-01-01 RX ADMIN — MIDAZOLAM HYDROCHLORIDE 2 MG: 1 INJECTION, SOLUTION INTRAMUSCULAR; INTRAVENOUS at 07:47

## 2018-01-01 RX ADMIN — SUCCINYLCHOLINE CHLORIDE 120 MG: 20 INJECTION, SOLUTION INTRAMUSCULAR; INTRAVENOUS at 07:57

## 2018-01-01 RX ADMIN — ROCURONIUM BROMIDE 5 MG: 10 INJECTION INTRAVENOUS at 07:57

## 2018-01-01 RX ADMIN — LEVOFLOXACIN 500 MG: 5 INJECTION, SOLUTION INTRAVENOUS at 07:39

## 2018-01-01 RX ADMIN — ROCURONIUM BROMIDE 15 MG: 10 INJECTION INTRAVENOUS at 08:01

## 2018-01-01 RX ADMIN — HYDROCODONE BITARTRATE AND ACETAMINOPHEN 1 TABLET: 7.5; 325 TABLET ORAL at 09:59

## 2018-01-01 RX ADMIN — METOPROLOL TARTRATE 2 MG: 5 INJECTION, SOLUTION INTRAVENOUS at 07:48

## 2018-01-01 RX ADMIN — FENTANYL CITRATE 100 MCG: 50 INJECTION INTRAMUSCULAR; INTRAVENOUS at 07:54

## 2018-01-01 RX ADMIN — Medication 900 MG: at 08:02

## 2018-01-01 RX ADMIN — LIDOCAINE HYDROCHLORIDE 1 EACH: 40 SOLUTION TOPICAL at 07:57

## 2018-01-17 ENCOUNTER — ANTI-COAG VISIT (OUTPATIENT)
Dept: CARDIOLOGY | Age: 58
End: 2018-01-17
Payer: MEDICARE

## 2018-01-17 DIAGNOSIS — I48.0 PAROXYSMAL ATRIAL FIBRILLATION (HCC): Primary | ICD-10-CM

## 2018-01-17 LAB
INTERNATIONAL NORMALIZATION RATIO, POC: 2.3
PROTHROMBIN TIME, POC: NORMAL

## 2018-01-17 PROCEDURE — 85610 PROTHROMBIN TIME: CPT | Performed by: CLINICAL NURSE SPECIALIST

## 2018-02-28 ENCOUNTER — ANTI-COAG VISIT (OUTPATIENT)
Dept: CARDIOLOGY | Age: 58
End: 2018-02-28
Payer: MEDICARE

## 2018-02-28 DIAGNOSIS — I48.0 PAROXYSMAL ATRIAL FIBRILLATION (HCC): Primary | ICD-10-CM

## 2018-02-28 LAB
INTERNATIONAL NORMALIZATION RATIO, POC: 2.4
PROTHROMBIN TIME, POC: NORMAL

## 2018-02-28 PROCEDURE — 85610 PROTHROMBIN TIME: CPT | Performed by: NURSE PRACTITIONER

## 2018-03-09 ENCOUNTER — HOSPITAL ENCOUNTER (OUTPATIENT)
Dept: VASCULAR LAB | Age: 58
Discharge: HOME | End: 2018-03-09
Payer: MEDICARE

## 2018-03-09 ENCOUNTER — HOSPITAL ENCOUNTER (OUTPATIENT)
Dept: NON INVASIVE DIAGNOSTICS | Age: 58
Discharge: HOME | End: 2018-03-09
Payer: MEDICARE

## 2018-03-09 DIAGNOSIS — R42 DIZZINESS AND GIDDINESS: Primary | ICD-10-CM

## 2018-03-09 LAB
EKG P AXIS: 67 DEGREES
EKG P-R INTERVAL: 170 MS
EKG Q-T INTERVAL: 414 MS
EKG QRS DURATION: 86 MS
EKG QTC CALCULATION (BAZETT): 404 MS
EKG T AXIS: 60 DEGREES

## 2018-03-09 PROCEDURE — 93880 EXTRACRANIAL BILAT STUDY: CPT

## 2018-03-09 PROCEDURE — 93005 ELECTROCARDIOGRAM TRACING: CPT

## 2018-03-12 ENCOUNTER — OFFICE VISIT (OUTPATIENT)
Dept: CARDIOLOGY | Age: 58
End: 2018-03-12
Payer: MEDICARE

## 2018-03-12 VITALS
HEART RATE: 60 BPM | HEIGHT: 70 IN | BODY MASS INDEX: 40.94 KG/M2 | SYSTOLIC BLOOD PRESSURE: 110 MMHG | DIASTOLIC BLOOD PRESSURE: 84 MMHG | WEIGHT: 286 LBS

## 2018-03-12 DIAGNOSIS — R06.09 DOE (DYSPNEA ON EXERTION): Primary | ICD-10-CM

## 2018-03-12 DIAGNOSIS — R42 DIZZINESS: ICD-10-CM

## 2018-03-12 DIAGNOSIS — I48.0 PAROXYSMAL ATRIAL FIBRILLATION (HCC): ICD-10-CM

## 2018-03-12 DIAGNOSIS — I10 ESSENTIAL HYPERTENSION: ICD-10-CM

## 2018-03-12 PROCEDURE — 3014F SCREEN MAMMO DOC REV: CPT | Performed by: NURSE PRACTITIONER

## 2018-03-12 PROCEDURE — 99213 OFFICE O/P EST LOW 20 MIN: CPT | Performed by: NURSE PRACTITIONER

## 2018-03-12 PROCEDURE — 1036F TOBACCO NON-USER: CPT | Performed by: NURSE PRACTITIONER

## 2018-03-12 PROCEDURE — 3017F COLORECTAL CA SCREEN DOC REV: CPT | Performed by: NURSE PRACTITIONER

## 2018-03-12 PROCEDURE — G8427 DOCREV CUR MEDS BY ELIG CLIN: HCPCS | Performed by: NURSE PRACTITIONER

## 2018-03-12 PROCEDURE — G8484 FLU IMMUNIZE NO ADMIN: HCPCS | Performed by: NURSE PRACTITIONER

## 2018-03-12 PROCEDURE — G8417 CALC BMI ABV UP PARAM F/U: HCPCS | Performed by: NURSE PRACTITIONER

## 2018-03-12 RX ORDER — BENAZEPRIL/HYDROCHLOROTHIAZIDE 20 MG-25MG
1 TABLET ORAL DAILY
COMMUNITY
End: 2018-03-26 | Stop reason: CLARIF

## 2018-03-12 NOTE — PROGRESS NOTES
Dear Luisa Thomas,* & Sohail Miranda, DO,    Thank you for allowing me to participate in the care of Ms. Alicia Wynne. She presents today at the [address] [place] [address] in the [facility]. As you know, Ms. Marino Bhakta is a 61 y.o. female with history of hypertension, hyperlipidemia, COPD, DVT, Tobacco abuse and PAF who presents with the chief complaint of SOA and dizziness. She is a patient of Dr. Mario Garcia. She was seen in Nov for her yearly follow up by myself and was without complaints. Since that time she has developed SOA/COATES. She gets SOA with walking at about 75 yards. Has to stop and rest with vacuuming, sweeping, mopping. Shopping, leans on cart to get her through. Sleeps with one pillow. Denies swelling. Denies PND. Dizziness- has occurred only twice- both times were within 10 minutes of getting out of bed. 1st time abut a week ago she made it to let the dog out and to the bathroom then the room spun and she stumbled against the wall and furniture. She does not think she hit her head nor loose consciousness. Then a week ago tomorrow- she was standing at sink and got dizzy. She did not check BP. She started taking BP after that and it has run 120s/80s-140s/80s. One time SBP was 150. Her  PCP started her on HCTZ, got EKG, and CDU- referred back to cardiology for dizziness. She has hx of PAF. She knows when she is out of [facility] and also has kept a check on her pulse. Her CDU showed <50% bilaterally with normal antegrade flow to the vertebral arteries. She otherwise denies chest pain, PND, orthopnea, syncope or near syncope. She has no other complaints. Review of Systems    Constitutional: Negative for fever, chills, diaphoresis, activity change, appetite change, fatigue and unexpected weight change. Eyes: Negative for photophobia, pain, redness and visual disturbance.    Respiratory: Negative for apnea, cough, chest Smoking status: Former Smoker     Packs/day: 1.00     Years: 38.00     Quit date: 3/23/2014    Smokeless tobacco: Never Used    Alcohol use Yes      Comment: occasional     Drug use: No    Sexual activity: Not on file     Other Topics Concern    Not on file     Social History Narrative    No narrative on file       Allergies   Allergen Reactions    Ace Inhibitors     Ciprofibrate     Codeine     Levaquin [Levofloxacin In D5w]     Pcn [Penicillins]          Current Outpatient Prescriptions:     benazepril-hydrochlorthiazide (LOTENSIN HCT) 20-25 MG per tablet, Take 1 tablet by mouth daily, Disp: , Rfl:     sotalol (BETAPACE) 160 MG tablet, TAKE 1 TABLET TWICE A DAY, Disp: 180 tablet, Rfl: 3    furosemide (LASIX) 20 MG tablet, Take 20 mg by mouth daily, Disp: , Rfl:     promethazine (PHENERGAN) 25 MG tablet, Take 25 mg by mouth every 6 hours as needed for Nausea, Disp: , Rfl:     potassium chloride 10 MEQ/100ML, Infuse 10 mEq intravenously once, Disp: , Rfl:     warfarin (COUMADIN) 5 MG tablet, TAKE 1 TABLET DAILY OR AS DIRECTED BY YOUR DOCTOR, Disp: 45 tablet, Rfl: 5    guaiFENesin (MUCINEX) 600 MG SR tablet, Take 400 mg by mouth 2 times daily, Disp: , Rfl:     senna (SENOKOT) 8.6 MG tablet, Take 1 tablet by mouth daily, Disp: , Rfl:     sertraline (ZOLOFT) 100 MG tablet, Take 100 mg by mouth daily, Disp: , Rfl:     HYDROcodone-acetaminophen (NORCO)  MG per tablet, Take 1 tablet by mouth every 6 hours as needed for Pain, Disp: , Rfl:     albuterol (PROVENTIL HFA;VENTOLIN HFA) 108 (90 BASE) MCG/ACT inhaler, Inhale 2 puffs into the lungs every 6 hours as needed for Wheezing., Disp: , Rfl:     ondansetron (ZOFRAN) 8 MG tablet,  Take 8 mg by mouth as needed , Disp: , Rfl:     ranitidine (ZANTAC) 150 MG tablet, Take 150 mg by mouth 2 times daily. , Disp: , Rfl:     pravastatin (PRAVACHOL) 20 MG tablet, Take 20 mg by mouth daily. , Disp: , Rfl:     VOLTAREN 1 % GEL, , Disp: , Rfl:     Multiple CREATININE 0.7 05/05/2014    CREATININE 0.6 10/11/2011    CREATININE 1.1 03/05/2011    CREATININE 1.3 03/03/2011    HGB 13.6 05/07/2014    HGB 14.9 05/05/2014    HGB 14.2 10/16/2013       ECG 03/12/18  Done at PCP, Sinus Richa Florian, 57 BPM      Assessment    1. COATES (dyspnea on exertion)    2. Paroxysmal atrial fibrillation (HCC)    3. Essential hypertension    4. Dizziness          Plan:    Dizziness- Carotid US was <50% bilaterally. BP is normal today and patient states runs in the 140s at home. We will have her keep a BP log for 2 weeks taking her BP 2 hours after taking her medication. Her PCP also just started her on HCTZ. Could be related to positional change with both episodes occurring after getting out of bed. Will review log in 2 weeks and make changes as necessary. She is in sinus rhythm today and she notes she always knows when she goes out of rhythm. We will have her monitor her HR as well. She is on Betapace 160 mg BID. Her Qt was normal on her EKG. SOA/COATES-will order Echo     Disposition - RTC in 2 weeks or sooner if needed    Please do not hesitate to contact me for any questions or concerns.     Sincerely yours,    FLOR Portillo

## 2018-03-12 NOTE — PATIENT INSTRUCTIONS
Take blood pressure readings and Heart Rate 2 hours after taking morning and evening medications. Keep a log and bring to next visit. Weigh daily:  Learn what your \"dry\" or \"ideal\" weight is - Dry weight is your weight without extra water (fluid). Weigh yourself at the same time each day, preferably in the morning, in similar clothing, after urinating but before eating, and on the same scale. Record your weight in a diary or calendar. If you gain two pounds in a day or three pounds in two days, double your water pill until you are back down to your dry weight. [place] at the [facility] @ [address]    Date/Time: wed march 21 11:30 A. M    Patient's contact number:  [phone] (home)     Echocardiogram -  No prep. Takes approximately 30 min. An echocardiogram uses sound waves to produce images of your heart. This commonly used test allows your doctor to see how your heart is beating and pumping blood. Your doctor can use the images from an echocardiogram to identify various abnormalities in the heart muscle and valves. This test has 2 parts:   Ø You will be asked to disrobe from the waist up and given a gown to wear. The technologist will then hook up an EKG monitor to you for the entire exam.   Ø You will then have an ultrasound of your heart (echocardiogram) to assess the heart muscle, heart valves and heart function. You may eat and take any medicines before the exam.     If you need to change your appointment, please call outpatient scheduling at [phone].

## 2018-03-21 ENCOUNTER — HOSPITAL ENCOUNTER (OUTPATIENT)
Dept: NON INVASIVE DIAGNOSTICS | Age: 58
Discharge: HOME | End: 2018-03-21
Payer: MEDICARE

## 2018-03-21 DIAGNOSIS — R06.09 DOE (DYSPNEA ON EXERTION): ICD-10-CM

## 2018-03-21 LAB
LV EF: 58 %
LVEF MODALITY: NORMAL

## 2018-03-21 PROCEDURE — 93306 TTE W/DOPPLER COMPLETE: CPT

## 2018-03-22 ENCOUNTER — TELEPHONE (OUTPATIENT)
Dept: CARDIOLOGY | Age: 58
End: 2018-03-22

## 2018-03-22 NOTE — TELEPHONE ENCOUNTER
Irish Rivera with PCP office is calling to follow up on patient plan. Advised patient had 2D echo yesterday not resulted yet. Irish Rivera wanted to know if you were aware of EKG in system on 3/9/18 from their office that shows ST elevation. Advised I would let you know.

## 2018-03-22 NOTE — TELEPHONE ENCOUNTER
Please let them know that reviewing her EKGs back to Sept 2015 she has always had the minimal elevation noted on Lead II. This is a normal variant. Will continue to monitor with routine EKGs. Echo is pending. She sees me on Monday for a 2 week follow up. I have had her keep a BP log. I reviewed her Carotid US results in the office with her at the last visit.

## 2018-03-26 ENCOUNTER — OFFICE VISIT (OUTPATIENT)
Dept: CARDIOLOGY | Age: 58
End: 2018-03-26
Payer: MEDICARE

## 2018-03-26 VITALS
WEIGHT: 281 LBS | BODY MASS INDEX: 40.23 KG/M2 | DIASTOLIC BLOOD PRESSURE: 68 MMHG | HEIGHT: 70 IN | SYSTOLIC BLOOD PRESSURE: 118 MMHG | HEART RATE: 54 BPM

## 2018-03-26 DIAGNOSIS — I10 ESSENTIAL HYPERTENSION: Primary | ICD-10-CM

## 2018-03-26 DIAGNOSIS — R06.02 SHORTNESS OF BREATH: ICD-10-CM

## 2018-03-26 DIAGNOSIS — I48.0 PAROXYSMAL ATRIAL FIBRILLATION (HCC): ICD-10-CM

## 2018-03-26 DIAGNOSIS — R94.31 ABNORMAL EKG: ICD-10-CM

## 2018-03-26 PROCEDURE — G8417 CALC BMI ABV UP PARAM F/U: HCPCS | Performed by: NURSE PRACTITIONER

## 2018-03-26 PROCEDURE — G8484 FLU IMMUNIZE NO ADMIN: HCPCS | Performed by: NURSE PRACTITIONER

## 2018-03-26 PROCEDURE — G8427 DOCREV CUR MEDS BY ELIG CLIN: HCPCS | Performed by: NURSE PRACTITIONER

## 2018-03-26 PROCEDURE — 1036F TOBACCO NON-USER: CPT | Performed by: NURSE PRACTITIONER

## 2018-03-26 PROCEDURE — 93000 ELECTROCARDIOGRAM COMPLETE: CPT | Performed by: NURSE PRACTITIONER

## 2018-03-26 PROCEDURE — 99213 OFFICE O/P EST LOW 20 MIN: CPT | Performed by: NURSE PRACTITIONER

## 2018-03-26 PROCEDURE — 3014F SCREEN MAMMO DOC REV: CPT | Performed by: NURSE PRACTITIONER

## 2018-03-26 PROCEDURE — 3017F COLORECTAL CA SCREEN DOC REV: CPT | Performed by: NURSE PRACTITIONER

## 2018-03-26 RX ORDER — HYDROCHLOROTHIAZIDE 25 MG/1
25 TABLET ORAL DAILY
COMMUNITY
End: 2018-03-26 | Stop reason: SDUPTHER

## 2018-03-26 RX ORDER — HYDROCHLOROTHIAZIDE 25 MG/1
25 TABLET ORAL DAILY
Qty: 30 TABLET | Refills: 1 | Status: SHIPPED | OUTPATIENT
Start: 2018-03-26

## 2018-03-26 NOTE — PROGRESS NOTES
temazepam (RESTORIL) 30 MG capsule, Take 30 mg by mouth nightly as needed. , Disp: , Rfl:     Cyanocobalamin (VITAMIN B-12 CR PO),  Take by mouth daily , Disp: , Rfl:     tizanidine (ZANAFLEX) 4 MG tablet, Take 4 mg by mouth every 6 hours as needed. 3 tabs , Disp: , Rfl:     Polyethylene Glycol 3350 (MIRALAX PO), Take  by mouth.  , Disp: , Rfl:     PE:  Vitals:    03/26/18 1110   BP: 118/68   Pulse: 54       Estimated body mass index is 40.32 kg/m² as calculated from the following:    Height as of this encounter: 5' 10\" (1.778 m). Weight as of this encounter: 281 lb (127.5 kg). Constitutional: She is oriented to person, place, and time. She appears well-developed and well-nourished in no acute distress. Head: Normocephalic and atraumatic. Neck:  Neck supple without JVD present. Cardiovascular: Normal rate, regular rhythm, normal heart sounds. no murmur ascultated. No gallop and no friction rub.  no carotid bruits. no peripheral edema. Pulmonary/Chest:  Lungs clear to auscultation bilaterally without evidence of respiratory distress. She without wheezes. She without rales or ronchi. Musculoskeletal: Normal range of motion. Gait is normal no assitive device. Neurological: She is alert and oriented to person, place, and time. Skin: Skin is warm and dry without rash or pallor. Psychiatric: She has a normal mood and affect. Her behavior is normal. Thought content normal.     Lab Results   Component Value Date    CREATININE 0.7 02/09/2015    CREATININE 0.7 05/07/2014    CREATININE 0.7 05/05/2014    CREATININE 0.6 10/11/2011    CREATININE 1.1 03/05/2011    CREATININE 1.3 03/03/2011    HGB 13.6 05/07/2014    HGB 14.9 05/05/2014    HGB 14.2 10/16/2013       ECG 03/26/18  Sinus Bradycardia, 53 BPM, ST elevation Inferior leads with negative precordial T waves noted on EKG interpretation. This has been reviewed with EKG from 3/9/18, 11,2017,  as well as EKG from 9/16/15. No changes noted.  She denies

## 2018-03-26 NOTE — PATIENT INSTRUCTIONS
Ok to take HCTZ at night as directed by PCP   Make sure you check your blood pressure. Continue to take the losartan in the morning  Continue Betapace twice daily   Continue to take Lasix as needed and check blood pressure when you take this. Weigh daily:  Learn what your \"dry\" or \"ideal\" weight is - Dry weight is your weight without extra water (fluid). Weigh yourself at the same time each day, preferably in the morning, in similar clothing, after urinating but before eating, and on the same scale. Record your weight in a diary or calendar. If you gain two pounds in a day or three pounds in two days, double your water pill until you are back down to your dry weight.

## 2018-04-06 ENCOUNTER — ANTI-COAG VISIT (OUTPATIENT)
Dept: CARDIOLOGY | Age: 58
End: 2018-04-06
Payer: MEDICARE

## 2018-04-06 DIAGNOSIS — I48.0 PAROXYSMAL ATRIAL FIBRILLATION (HCC): Primary | ICD-10-CM

## 2018-04-06 LAB
INTERNATIONAL NORMALIZATION RATIO, POC: 4.9
PROTHROMBIN TIME, POC: NORMAL

## 2018-04-06 PROCEDURE — 85610 PROTHROMBIN TIME: CPT | Performed by: CLINICAL NURSE SPECIALIST

## 2018-04-20 ENCOUNTER — ANTI-COAG VISIT (OUTPATIENT)
Dept: CARDIOLOGY | Age: 58
End: 2018-04-20
Payer: MEDICARE

## 2018-04-20 DIAGNOSIS — I48.0 PAROXYSMAL ATRIAL FIBRILLATION (HCC): Primary | ICD-10-CM

## 2018-04-20 LAB
INTERNATIONAL NORMALIZATION RATIO, POC: 2.9
PROTHROMBIN TIME, POC: NORMAL

## 2018-04-20 PROCEDURE — 85610 PROTHROMBIN TIME: CPT | Performed by: CLINICAL NURSE SPECIALIST

## 2018-05-02 NOTE — PATIENT INSTRUCTIONS

## 2018-05-16 PROBLEM — N20.1 URETEROLITHIASIS: Status: ACTIVE | Noted: 2018-01-01

## 2018-05-16 NOTE — PROGRESS NOTES
Subjective    Ms. Ferris is 57 y.o. female    Chief Complaint: Hematuria    History of Present Illness     Hematuria  Patient complains of gross hematuria. Onset of hematuria was 3 months ago and was sudden in onset. There is a history of nephrolithiasis. There is not a history of urologic trauma. Other urologic symptoms include urgency, frequency. Patient admits to history of smoking. Patient denies history of previous infection. Prior workup has been none. Prior workup has revealed no etiology.    The following portions of the patient's history were reviewed and updated as appropriate: allergies, current medications, past family history, past medical history, past social history, past surgical history and problem list.    Review of Systems   Constitutional: Negative for chills and fever.   Gastrointestinal: Negative for abdominal pain, anal bleeding and blood in stool.   Genitourinary: Negative for flank pain and hematuria.         Current Outpatient Prescriptions:   •  albuterol (PROVENTIL HFA;VENTOLIN HFA) 108 (90 BASE) MCG/ACT inhaler, Inhale 2 puffs Every 4 (Four) Hours As Needed for wheezing., Disp: , Rfl:   •  B Complex Vitamins (VITAMIN B COMPLEX PO), Take  by mouth., Disp: , Rfl:   •  Biotin 5 MG capsule, Take  by mouth., Disp: , Rfl:   •  celecoxib (CeleBREX) 200 MG capsule, Take 200 mg by mouth Daily., Disp: , Rfl:   •  diclofenac (VOLTAREN) 1 % gel gel, Apply 4 g topically 4 (Four) Times a Day As Needed., Disp: , Rfl:   •  furosemide (LASIX) 20 MG tablet, Take 20 mg by mouth Daily As Needed., Disp: , Rfl:   •  guaiFENesin (MUCINEX) 600 MG 12 hr tablet, Take 400 mg by mouth 2 (Two) Times a Day., Disp: , Rfl:   •  HYDROcodone-acetaminophen (NORCO)  MG per tablet, Take 1 tablet by mouth Every 6 (Six) Hours As Needed for moderate pain (4-6)., Disp: , Rfl:   •  LORazepam (ATIVAN) 1 MG tablet, Take 1 mg by mouth Every 8 (Eight) Hours As Needed for anxiety., Disp: , Rfl:   •  losartan (COZAAR) 50 MG  tablet, Take 100 mg by mouth Daily., Disp: , Rfl:   •  Multiple Vitamins-Minerals (MULTIVITAMIN ADULT PO), Take  by mouth., Disp: , Rfl:   •  ondansetron (ZOFRAN) 8 MG tablet, Take 8 mg by mouth Every 8 (Eight) Hours As Needed for nausea or vomiting., Disp: , Rfl:   •  polyethylene glycol (MIRALAX) packet, Take 17 g by mouth Daily., Disp: , Rfl:   •  potassium chloride (K-DUR,KLOR-CON) 20 MEQ CR tablet, Take 20 mEq by mouth Daily As Needed., Disp: , Rfl:   •  pravastatin (PRAVACHOL) 20 MG tablet, Take 20 mg by mouth Daily., Disp: , Rfl:   •  promethazine (PHENERGAN) 25 MG tablet, Take 25 mg by mouth Every 6 (Six) Hours As Needed for nausea or vomiting., Disp: , Rfl:   •  raNITIdine (ZANTAC) 150 MG tablet, Take 150 mg by mouth 2 (Two) Times a Day., Disp: , Rfl:   •  Sennosides 8.6 MG capsule, Take  by mouth., Disp: , Rfl:   •  sertraline (ZOLOFT) 100 MG tablet, Take 100 mg by mouth Daily., Disp: , Rfl:   •  Sotalol HCl, AF, 160 MG tablet, Take  by mouth., Disp: , Rfl:   •  temazepam (RESTORIL) 30 MG capsule, Take 30 mg by mouth At Night As Needed for sleep., Disp: , Rfl:   •  tiZANidine (ZANAFLEX) 4 MG tablet, Take 4 mg by mouth At Night As Needed for muscle spasms., Disp: , Rfl:   •  warfarin (COUMADIN) 2.5 MG tablet, Take 2.5 mg by mouth., Disp: , Rfl:   •  warfarin (COUMADIN) 5 MG tablet, Take 5 mg by mouth., Disp: , Rfl:     Past Medical History:   Diagnosis Date   • Adenocarcinoma of lung 2014   • Anemia     d/t chemotherapy   • Anxiety    • Atrial fibrillation    • Chronic pain    • COPD (chronic obstructive pulmonary disease)    • Coronary artery disease    • Depression    • DVT (deep venous thrombosis)    • Generalized headaches    • High serum carcinoembryonic antigen (CEA)    • History of radiation therapy     6300 cGy to lung completed 8-6-2014; prophylactic brain radiation 2600 cGy completed 4-   • Hx of degenerative disc disease    • Hyperglycemia    • Hyperlipidemia    • Hypertension    •  Meningitis    • Morbid obesity    • Nephrolithiasis    • Osteoarthritis    • Oxygen desaturation during sleep     Wears Oxygen at Night or when she naps   • Personal history of chemotherapy     For Lung Cancer       Past Surgical History:   Procedure Laterality Date   • BRONCHOSCOPY     • COLONOSCOPY     • KNEE SURGERY Left     x 3   • PARTIAL HYSTERECTOMY  12/2003   • PORTACATH PLACEMENT Left    • SKIN LESION EXCISION      Negative for malignacy   • TONSILLECTOMY         Social History     Social History   • Marital status:    • Number of children: 0     Social History Main Topics   • Smoking status: Former Smoker     Packs/day: 1.50     Types: Cigarettes     Quit date: 11/18/2013   • Smokeless tobacco: Never Used   • Alcohol use No      Comment: social   • Drug use: No   • Sexual activity: Defer     Other Topics Concern   • Not on file       Family History   Problem Relation Age of Onset   • Heart failure Mother    • Prostate cancer Father    • No Known Problems Sister    • Cancer Brother         skin cancer   • Lung cancer Sister    • Cancer Sister         skin cancer       Objective    There were no vitals taken for this visit.    Physical Exam   Constitutional: She is oriented to person, place, and time. She appears well-developed and well-nourished. No distress.   Pulmonary/Chest: Effort normal.   Abdominal: Soft. She exhibits no distension and no mass. There is no tenderness. There is no rebound and no guarding. No hernia.   Neurological: She is alert and oriented to person, place, and time.   Skin: Skin is warm and dry. She is not diaphoretic.   Psychiatric: She has a normal mood and affect.   Vitals reviewed.      CT independent reivew    The CT scan of the abdomen/pelvis done with and without contrast is available for me to review.  Treatment recommendations require an independent review.  First I scanned the liver, spleen, and bowel pattern.  The retroperitoneum including the major vessels and  lymphatic packages are briefly reviewed.  This film as been reviewed by the radiologist to determine any non urologic abnormalities that are present.  The kidneys are closely inspected for size, symmetry, contour, parenchymal thickness, perinephric reaction, presence of calcifications, and intrarenal dilation of the collecting system.  The ureters are inspected for their course, caliber, and any calcifications.  The bladder is inspected for its thickness, size, and presence of any calcifications.  This scan shows:    The right kidney appears 6mm stone proximal ureteral stone with no hydronephrosis, stone in calyceal diverituclum    The left kidney appears normal on this contrasted CT scan.  The renal parenchymal is norml in thickness.  There are no solid masses or cysts.  There is no hydronephrosis.  There are no stones.      The bladder appears normal on this non-contrasted CT scan.  The bladder appears normal in thickness.  There no masses or stones seen on this exam.     Pre- operative diagnosis:  Hematuria    Post operative diagnosis:  Same    Procedure:  The patient was prepped and draped in a normal sterile fashion.  The urethra was anesthetized with 2% lidocaine jelly.  A rigid cystoscope was introduced per urethra.      Urethra:  Normal    Bladder:  There is no evidence of a stone, foreign body or mass within the bladder.  The bladder is minimally trabeculated.  The bladder neck is without contracture.    Ureteral orifices:  Normal position bilaterally and Clear efflux bilaterally    Patient tolerated the procedure well    Complications: none    Blood loss: minimal    Follow up:    Routine follow up      Results for orders placed or performed in visit on 05/16/18   POC Urinalysis Dipstick, Automated   Result Value Ref Range    Color Yellow Yellow, Straw, Dark Yellow, Stephanie    Clarity, UA Clear Clear    Glucose, UA Negative Negative, 1000 mg/dL (3+) mg/dL    Bilirubin Negative Negative    Ketones, UA Negative  Negative    Specific Gravity  1.015 1.005 - 1.030    Blood, UA Large (A) Negative    pH, Urine 5.0 5.0 - 8.0    Protein, POC Trace (A) Negative mg/dL    Urobilinogen, UA Normal Normal    Leukocytes Negative Negative    Nitrite, UA Negative Negative     Assessment and Plan    Diagnoses and all orders for this visit:    Gross hematuria  -     POC Urinalysis Dipstick, Automated    Ureteroscopy  The patient has a proximal ureteral calculus as defined by symptoms and radiographic studies.  Options for the management of this stone are discussed based upon size, location, symptoms, and probable composition including watchful waiting, expulsive therapy, ESWL, ureteral stenting, percutaneous management, and open approaches.  Based upon this discussed, The patient has elected to proceed with ureteroscopic management of the stone.  Ms. Ferris understands that a laser or other fragmentation aid may be needed.  The need for ureteral stenting and subsequent removal is also discussed.  Risks of bleeding, infection, damage to urethra or bladder, ureteral perforation or avulsion, pain, and post operative stent discomfort are all discussed.  I discussed the need for return follow up for stent removal, and that failure to do so could result in significant infection, further stone formation, need for surgical intervention to remove the stent, or permanent kidney damage.  All questions were answered to the patient's satifaction      6 mm proximal ureteral stone right ureter she also has a stone which appears to be a calyceal diverticulum on the right plantar very flexible ureteroscopic management of her proximal ureteral stone.  This resulted in significant evaluation management in addition to the cystoscopy done today.

## 2018-05-17 NOTE — DISCHARGE INSTRUCTIONS
DAY OF SURGERY INSTRUCTIONS        YOUR SURGEON: dr montgomery    PROCEDURE: ***URETEROSCOPY LASER LITHOTRIPSY WITH STENT INSERTION    DATE OF SURGERY: ***5/21/2018    ARRIVAL TIME: AS DIRECTED BY OFFICE    DAY OF SURGERY TAKE ONLY THESE MEDICATIONS UNLESS OTHERWISE INSTRUCTED BY YOUR PHYSICIAN: ***inhaler if needed          MANAGING PAIN AFTER SURGERY    We know you are probably wondering what your pain will be like after surgery.  Following surgery it is unrealistic to expect you will not have pain.   Pain is how our bodies let us know that something is wrong or cautions us to be careful.  That said, our goal is to make your pain tolerable.    Methods we may use to treat your pain include (oral or IV medications, PCAs, epidurals, nerve blocks, etc.)   While some procedures require IV pain medications for a short time after surgery, transitioning to pain medications by mouth allows for better management of pain.   Your nurse will encourage you to take oral pain medications whenever possible.  IV medications work almost immediately, but only last a short while.  Taking medications by mouth allows for a more constant level of medication in your blood stream for a longer period of time.      Once your pain is out of control it is harder to get back under control.  It is important you are aware when your next dose of pain medication is due.  If you are admitted, your nurse may write the time of your next dose on the white board in your room to help you remember.      We are interested in your pain and encourage you to inform us about aggravating factors during your visit.   Many times a simple repositioning every few hours can make a big difference.    If your physician says it is okay, do not let your pain prevent you from getting out of bed. Be sure to call your nurse for assistance prior to getting up so you do not fall.      Before surgery, please decide your tolerable pain goal.  These faces help describe the pain  ratings we use on a 0-10 scale.   Be prepared to tell us your goal and whether or not you take pain or anxiety medications at home.            BEFORE YOU COME TO THE HOSPITAL  (Pre-op instructions)  • Do not eat, drink, smoke or chew gum after midnight the night before surgery.  This also includes no mints.  • Morning of surgery take only the medicines you have been instructed with a sip of water unless otherwise instructed  by your physician.  • Do not shave, wear makeup or dark nail polish.  • Remove all jewelry including rings.  • Leave anything you consider valuable at home.  • Leave your suitcase in the car until after your surgery.  • Bring the following with you if applicable:  o Picture ID and insurance, Medicare or Medicaid cards  o Co-pay/deductible required by insurance (cash, check, credit card)  o Copy of advance directive, living will or power-of- documents if not brought to PAT  o CPAP or BIPAP mask and tubing  o Relaxation aids (MP3 player, book, magazine)  • On the day of surgery check in at registration located at the main entrance of the hospital.       Outpatient Surgery Guidelines, Adult  Outpatient procedures are those for which the person having the procedure is allowed to go home the same day as the procedure. Various procedures are done on an outpatient basis. You should follow some general guidelines if you will be having an outpatient procedure.  LET YOUR HEALTH CARE PROVIDER KNOW ABOUT:  · Any allergies you have.  · All medicines you are taking, including vitamins, herbs, eye drops, creams, and over-the-counter medicines.  · Previous problems you or members of your family have had with the use of anesthetics.  · Any blood disorders you have.  · Previous surgeries you have had.  · Medical conditions you have.  RISKS AND COMPLICATIONS  Your health care provider will discuss possible risks and complications with you before surgery. Common risks and complications include:     · Problems due to the use of anesthetics.  · Blood loss and replacement (does not apply to minor surgical procedures).  · Temporary increase in pain due to surgery.  · Uncorrected pain or problems that the surgery was meant to correct.  · Infection.  · New damage.  BEFORE THE PROCEDURE  · Ask your health care provider about changing or stopping your regular medicines. You may need to stop taking certain medicines in the days or weeks before the procedure.  · Stop smoking at least 2 weeks before surgery. This lowers your risk for complications during and after surgery. Ask your health care provider for help with this if needed.  · Eat your usual meals and a light supper the day before surgery. Continue fluid intake. Do not drink alcohol.  · Do not eat or drink after midnight the night before your surgery.   · Arrange for someone to take you home and to stay with you for 24 hours after the procedure. Medicine given for your procedure may affect your ability to drive or to care for yourself.  · Call your health care provider's office if you develop an illness or problem that may prevent you from safely having your procedure.  AFTER THE PROCEDURE  After surgery, you will be taken to a recovery area, where your progress will be monitored. If there are no complications, you will be allowed to go home when you are awake, stable, and taking fluids well. You may have numbness around the surgical site. Healing will take some time. You will have tenderness at the surgical site and may have some swelling and bruising. You may also have some nausea.  HOME CARE INSTRUCTIONS  · Do not drive for 24 hours, or as directed by your health care provider. Do not drive while taking prescription pain medicines.  · Do not drink alcohol for 24 hours.  · Do not make important decisions or sign legal documents for 24 hours.  · You may resume a normal diet and activities as directed.  · Do not lift anything heavier than 10 pounds (4.5 kg) or  play contact sports until your health care provider says it is okay.  · Change your bandages (dressings) as directed.  · Only take over-the-counter or prescription medicines as directed by your health care provider.  · Follow up with your health care provider as directed.  SEEK MEDICAL CARE IF:  · You have increased bleeding (more than a small spot) from the surgical site.  · You have redness, swelling, or increasing pain in the wound.  · You see pus coming from the wound.  · You have a fever.  · You notice a bad smell coming from the wound or dressing.  · You feel lightheaded or faint.  · You develop a rash.  · You have trouble breathing.  · You develop allergies.  MAKE SURE YOU:  · Understand these instructions.  · Will watch your condition.  · Will get help right away if you are not doing well or get worse.     This information is not intended to replace advice given to you by your health care provider. Make sure you discuss any questions you have with your health care provider.     Document Released: 09/12/2002 Document Revised: 05/03/2016 Document Reviewed: 05/22/2014  [employer] Interactive Patient Education ©2016 [employer]       Fall Prevention in Hospitals, Adult  As a hospital patient, your condition and the treatments you receive can increase your risk for falls. Some additional risk factors for falls in a hospital include:  · Being in an unfamiliar environment.  · Being on bed rest.  · Your surgery.  · Taking certain medicines.  · Your tubing requirements, such as intravenous (IV) therapy or catheters.  It is important that you learn how to decrease fall risks while at the hospital. Below are important tips that can help prevent falls.  SAFETY TIPS FOR PREVENTING FALLS  Talk about your risk of falling.  · Ask your health care provider why you are at risk for falling. Is it your medicine, illness, tubing placement, or something else?  · Make a plan with your health care provider to keep you safe from  falls.  · Ask your health care provider or pharmacist about side effects of your medicines. Some medicines can make you dizzy or affect your coordination.  Ask for help.  · Ask for help before getting out of bed. You may need to press your call button.  · Ask for assistance in getting safely to the toilet.  · Ask for a walker or cane to be put at your bedside. Ask that most of the side rails on your bed be placed up before your health care provider leaves the room.  · Ask family or friends to sit with you.  · Ask for things that are out of your reach, such as your glasses, hearing aids, telephone, bedside table, or call button.  Follow these tips to avoid falling:  · Stay lying or seated, rather than standing, while waiting for help.  · Wear rubber-soled slippers or shoes whenever you walk in the hospital.  · Avoid quick, sudden movements.  ¨ Change positions slowly.  ¨ Sit on the side of your bed before standing.  ¨ Stand up slowly and wait before you start to walk.  · Let your health care provider know if there is a spill on the floor.  · Pay careful attention to the medical equipment, electrical cords, and tubes around you.  · When you need help, use your call button by your bed or in the bathroom. Wait for one of your health care providers to help you.  · If you feel dizzy or unsure of your footing, return to bed and wait for assistance.  · Avoid being distracted by the TV, telephone, or another person in your room.  · Do not lean or support yourself on rolling objects, such as IV poles or bedside tables.     This information is not intended to replace advice given to you by your health care provider. Make sure you discuss any questions you have with your health care provider.     Document Released: 12/15/2001 Document Revised: 01/08/2016 Document Reviewed: 08/25/2013  [employer] Interactive Patient Education ©2016 [employer]       Surgical Site Infections FAQs  What is a Surgical Site Infection (SSI)?  A  surgical site infection is an infection that occurs after surgery in the part of the body where the surgery took place. Most patients who have surgery do not develop an infection. However, infections develop in about 1 to 3 out of every 100 patients who have surgery.  Some of the common symptoms of a surgical site infection are:  · Redness and pain around the area where you had surgery  · Drainage of cloudy fluid from your surgical wound  · Fever  Can SSIs be treated?  Yes. Most surgical site infections can be treated with antibiotics. The antibiotic given to you depends on the bacteria (germs) causing the infection. Sometimes patients with SSIs also need another surgery to treat the infection.  What are some of the things that hospitals are doing to prevent SSIs?  To prevent SSIs, doctors, nurses, and other healthcare providers:  · Clean their hands and arms up to their elbows with an antiseptic agent just before the surgery.  · Clean their hands with soap and water or an alcohol-based hand rub before and after caring for each patient.  · May remove some of your hair immediately before your surgery using electric clippers if the hair is in the same area where the procedure will occur. They should not shave you with a razor.  · Wear special hair covers, masks, gowns, and gloves during surgery to keep the surgery area clean.  · Give you antibiotics before your surgery starts. In most cases, you should get antibiotics within 60 minutes before the surgery starts and the antibiotics should be stopped within 24 hours after surgery.  · Clean the skin at the site of your surgery with a special soap that kills germs.  What can I do to help prevent SSIs?  Before your surgery:  · Tell your doctor about other medical problems you may have. Health problems such as allergies, diabetes, and obesity could affect your surgery and your treatment.  · Quit smoking. Patients who smoke get more infections. Talk to your doctor about how  you can quit before your surgery.  · Do not shave near where you will have surgery. Shaving with a razor can irritate your skin and make it easier to develop an infection.  At the time of your surgery:  · Speak up if someone tries to shave you with a razor before surgery. Ask why you need to be shaved and talk with your surgeon if you have any concerns.  · Ask if you will get antibiotics before surgery.  After your surgery:  · Make sure that your healthcare providers clean their hands before examining you, either with soap and water or an alcohol-based hand rub.  · If you do not see your providers clean their hands, please ask them to do so.  · Family and friends who visit you should not touch the surgical wound or dressings.  · Family and friends should clean their hands with soap and water or an alcohol-based hand rub before and after visiting you. If you do not see them clean their hands, ask them to clean their hands.  What do I need to do when I go home from the hospital?  · Before you go home, your doctor or nurse should explain everything you need to know about taking care of your wound. Make sure you understand how to care for your wound before you leave the hospital.  · Always clean your hands before and after caring for your wound.  · Before you go home, make sure you know who to contact if you have questions or problems after you get home.  · If you have any symptoms of an infection, such as redness and pain at the surgery site, drainage, or fever, call your doctor immediately.  If you have additional questions, please ask your doctor or nurse.  Developed and co-sponsored by The Society for Healthcare Epidemiology of [place] (SHEA); Infectious Diseases Society of [place] (IDSA); American Hospital Association; Association for Professionals in Infection Control and Epidemiology (APIC); Centers for Disease Control and Prevention (CDC); and The Joint Commission.     This information is not intended to replace  advice given to you by your health care provider. Make sure you discuss any questions you have with your health care provider.     Document Released: 12/23/2014 Document Revised: 01/08/2016 Document Reviewed: 03/02/2016  [employer] Interactive Patient Education ©2016 [employer]     PATIENT/FAMILY/RESPONSIBLE PARTY VERBALIZES UNDERSTANDING OF ABOVE EDUCATION.  COPY OF PAIN SCALE GIVEN AND REVIEWED WITH VERBALIZED UNDERSTANDING.

## 2018-05-18 NOTE — TELEPHONE ENCOUNTER
Faxed prescription to the pharmacy. Called and informed patient of her positive urine culture. I did state she will need to start this today in order for her to keep surgery on Monday. Pt confirmed all info given.

## 2018-05-21 NOTE — ANESTHESIA PREPROCEDURE EVALUATION
Anesthesia Evaluation     Patient summary reviewed   no history of anesthetic complications:  NPO Solid Status: > 8 hours  NPO Liquid Status: > 8 hours           Airway   Mallampati: II  TM distance: >3 FB  Neck ROM: full  Dental          Pulmonary - normal exam    breath sounds clear to auscultation  (+) a smoker (quit 5 months ago) Former, lung cancer (No surgery, treated with chemotherapy), COPD,   (-) asthma, recent URI, sleep apnea  Cardiovascular   Exercise tolerance: good (4-7 METS) (Limited due to SOB and knee pain)    ECG reviewed  PT is on anticoagulation therapy  Patient on routine beta blocker and Beta blocker given within 24 hours of surgery  Rhythm: regular  Rate: normal    (+) hypertension, dysrhythmias (Cardioversions last done 2014, treated with ) Atrial Fib, DVT,   (-) pacemaker, past MI, angina, cardiac stents, CABG    ROS comment: TTE 3/21/18 - Summary   Structurally normal mitral valve with normal leaflet mobility.   No evidence of mitral regurgitation.   Mildly dilated left atrium.  Normal left ventricular size with preserved LV function and an estimated ejection fraction of approximately 55-60%.  E/A flow reversal noted. Suggestive of diastolic dysfunction.  Mild concentric left ventricular hypertrophy.    Neuro/Psych  (+) psychiatric history Anxiety and Depression,     (-) seizures, TIA, CVA  GI/Hepatic/Renal/Endo    (+) obesity, morbid obesity,  renal disease stones,   (-) GERD, liver disease, diabetes, hypothyroidism, hyperthyroidism    Musculoskeletal     Abdominal    Substance History      OB/GYN          Other                      Anesthesia Plan    ASA 3     general     intravenous induction   Anesthetic plan and risks discussed with patient.

## 2018-05-21 NOTE — OP NOTE
URETEROSCOPY LASER LITHOTRIPSY WITH STENT INSERTION  Procedure Note    Cary Ferris  5/21/2018    Pre-op Diagnosis:   Ureterolithiasis [N20.1]    Post-op Diagnosis:     Post-Op Diagnosis Codes:     * Ureterolithiasis [N20.1]    Procedure/CPT® Codes:      Procedure(s):  CYSTO, URETEROSCOPY LASER LITHOTRIPSY WITH STENT INSERTION, RETROGRADE PYELOGRAM,    Surgeon(s):  Grzegorz Landaverde MD    Anesthesia: General    Staff:   Circulator: Citlalli Durán RN  Scrub Person: Lora ACUNA Rayray; Arik Linn; Vonnie Hall    Estimated Blood Loss: none    Specimens:                ID Type Source Tests Collected by Time   A :  Calculus Ureter, Right TISSUE PATHOLOGY EXAM Grzegorz Landaverde MD 5/21/2018 0826         Drains:      Findings: 7mm stone right proximal ureter, 8mm stone in upper pole of right kidney    Complications: None    Indications: 57-year-old female who had gross hematuria CT scan showed 7 mm proximal ureteral stone 8 mm diameter kidney.  Options were discussed she opted for ureteroscopic management.  She is on Coumadin and this was the best option for her.    Description of Procedure: After informed consent obtained, the patient brought the operating room where she underwent general endotracheal anesthesia in the supine position.  She was converted to the dorsal lithotomy position.  She was prepped and draped in the usual sterile fashion.  Timeout was done to ensure the correct patient, procedure and site.  She did receive preoperative antibiotics the holding area.    22 [place] Ze[place] Storz endoscope inserted urethra retrograde fashion.  Bladder was inspected there was no lesions noted.  The right ureteral orifice was identified and cannulated with a 0.38 sensor tip wire.  A 10 [place]ander dual lumen catheter was placed over this and 10 mL dilute contrast used outline the collecting system there.  Filling defect at the bottom of L3.  I then placed another wire through the 10 [place] dual lumen catheter.   I then placed a 12, 14 Malay 26 cm ureteral access sheath.  This went up atraumatically.  I then placed a scope up to level the stone at L3.  A 200 µ fiber on 6.3 J and 80 Hz dust the stone and retropulsed up into the midpole calyx were chased up there and treated it into dust there is no fragments that were larger than 200 µ fiber laser tip.  I then inspected all the calyces was an 8 mm stone in the upper pole calyx I went ahead and treated this on the same settings again into dust there was no fragments that were larger than the tip of the laser fiber.  I then inspected the ureter its entirety the way out there was no ureteral injury seen.  I then backloaded the cystoscope over the wire placed a 624 Percuflex stent with good curl seen in the renal pelvis and a good curl seen distally in the bladder.  The bladder was drained scope was removed the string was secured to the patient patient awakened anesthesia and transferred current satisfactory condition.    Right retropyelogram read 10 Malay dual lumen catheter was placed over pre-soup a 0.38 sensor tip wire and 10 mL dilute contrast used outline the collecting system.  There is a filling defect noted at L3 consistent with stone disease or some mild hydronephrosis with dilation or ureter and mild dilation renal pelvis.    Grzegorz Landaverde MD     Date: 5/21/2018  Time: 8:48 AM

## 2018-05-21 NOTE — DISCHARGE INSTRUCTIONS

## 2018-05-21 NOTE — ANESTHESIA POSTPROCEDURE EVALUATION
Patient: Cary Ferris    Procedure Summary     Date:  05/21/18 Room / Location:   [facility] OR 01 /  [facility]    Anesthesia Start:  0753 Anesthesia Stop:  0848    Procedure:  CYSTO, URETEROSCOPY LASER LITHOTRIPSY WITH STENT INSERTION, RETROGRADE PYELOGRAM, (Right Ureter) Diagnosis:       Ureterolithiasis      (Ureterolithiasis [N20.1])    Surgeon:  Grzegorz Landaverde MD Provider:  Grzegorz Rothman CRNA    Anesthesia Type:  general ASA Status:  3          Anesthesia Type: general  Last vitals  BP   157/81 (05/21/18 0945)   Temp   97.4 °F (36.3 °C) (05/21/18 0915)   Pulse   58 (05/21/18 0945)   Resp   18 (05/21/18 0945)     SpO2   95 % (05/21/18 0945)     Post Anesthesia Care and Evaluation    Patient location during evaluation: PACU  Patient participation: complete - patient participated  Level of consciousness: awake and alert  Pain management: adequate  Airway patency: patent  Anesthetic complications: No anesthetic complications  PONV Status: controlled  Cardiovascular status: acceptable and hemodynamically stable  Respiratory status: acceptable  Hydration status: acceptable    Comments: Patient discharged from PACU prior to anesthesia evaluation based on Bryan Score.  For details, see RN note.     /81   Pulse 58   Temp 97.4 °F (36.3 °C) (Temporal Artery )   Resp 18   SpO2 95%

## 2018-05-21 NOTE — ANESTHESIA PROCEDURE NOTES
Airway  Urgency: elective    Airway not difficult    General Information and Staff    Patient location during procedure: OR  CRNA: OLIMPIA CHAVEZ    Indications and Patient Condition  Indications for airway management: airway protection    Preoxygenated: yes  MILS not maintained throughout  Mask difficulty assessment: 1 - vent by mask    Final Airway Details  Final airway type: endotracheal airway      Successful airway: ETT  Cuffed: yes   Successful intubation technique: direct laryngoscopy  Facilitating devices/methods: intubating stylet  Endotracheal tube insertion site: oral  Blade: Lane  Blade size: #2  ETT size: 7.5 mm  Cormack-Lehane Classification: grade I - full view of glottis  Placement verified by: chest auscultation and capnometry   Measured from: lips  ETT to lips (cm): 22  Number of attempts at approach: 1

## 2018-05-24 PROBLEM — Z87.891 FORMER SMOKER: Status: ACTIVE | Noted: 2018-01-01

## 2018-05-24 PROBLEM — Z85.118 ENCOUNTER FOR FOLLOW-UP SURVEILLANCE OF LUNG CANCER: Status: ACTIVE | Noted: 2018-01-01

## 2018-05-24 PROBLEM — Z08 ENCOUNTER FOR FOLLOW-UP SURVEILLANCE OF LUNG CANCER: Status: ACTIVE | Noted: 2018-01-01

## 2018-05-25 NOTE — PROGRESS NOTES
Patient of Dr. Landaverde states she is here today to get her stent removed SP Ureteroscopy Laser Litho with stent placement on 05/21/2018. Patient denies any fever, chills, N&V or hematuria. The tape was removed and using the string stent was pulled with no complications. The patient was advised to continue any medications prescribed in the hospital and to call if he has any questions or concerns. The patient verbalized understanding. Follow up with Dr. Landaverde in 6 weeks with Renal US prior. Dr. Landaverde was in the office for this procedure.     Reviewed and agree with above.

## 2018-06-01 ENCOUNTER — ANTI-COAG VISIT (OUTPATIENT)
Dept: CARDIOLOGY | Age: 58
End: 2018-06-01
Payer: MEDICARE

## 2018-06-01 DIAGNOSIS — I48.0 PAROXYSMAL ATRIAL FIBRILLATION (HCC): Primary | ICD-10-CM

## 2018-06-01 LAB
INTERNATIONAL NORMALIZATION RATIO, POC: 2.5
PROTHROMBIN TIME, POC: NORMAL

## 2018-06-01 PROCEDURE — 85610 PROTHROMBIN TIME: CPT | Performed by: CLINICAL NURSE SPECIALIST

## 2018-07-02 NOTE — PATIENT INSTRUCTIONS

## 2018-07-13 ENCOUNTER — ANTI-COAG VISIT (OUTPATIENT)
Dept: CARDIOLOGY | Age: 58
End: 2018-07-13
Payer: MEDICARE

## 2018-07-13 DIAGNOSIS — I48.0 PAROXYSMAL ATRIAL FIBRILLATION (HCC): Primary | ICD-10-CM

## 2018-07-13 LAB
INTERNATIONAL NORMALIZATION RATIO, POC: 3.8
PROTHROMBIN TIME, POC: NORMAL

## 2018-07-13 PROCEDURE — 85610 PROTHROMBIN TIME: CPT | Performed by: CLINICAL NURSE SPECIALIST

## 2018-08-24 ENCOUNTER — ANTI-COAG VISIT (OUTPATIENT)
Dept: CARDIOLOGY | Age: 58
End: 2018-08-24
Payer: MEDICARE

## 2018-08-24 DIAGNOSIS — I48.0 PAROXYSMAL ATRIAL FIBRILLATION (HCC): Primary | ICD-10-CM

## 2018-08-24 LAB
INTERNATIONAL NORMALIZATION RATIO, POC: 2.6
PROTHROMBIN TIME, POC: NORMAL

## 2018-08-24 PROCEDURE — 85610 PROTHROMBIN TIME: CPT | Performed by: CLINICAL NURSE SPECIALIST

## 2018-10-05 ENCOUNTER — ANTI-COAG VISIT (OUTPATIENT)
Dept: CARDIOLOGY | Age: 58
End: 2018-10-05
Payer: COMMERCIAL

## 2018-10-05 DIAGNOSIS — I48.0 PAROXYSMAL ATRIAL FIBRILLATION (HCC): Primary | ICD-10-CM

## 2018-10-05 LAB
INTERNATIONAL NORMALIZATION RATIO, POC: 2.6
PROTHROMBIN TIME, POC: NORMAL

## 2018-10-05 PROCEDURE — 85610 PROTHROMBIN TIME: CPT | Performed by: CLINICAL NURSE SPECIALIST

## 2018-11-08 ENCOUNTER — OFFICE VISIT (OUTPATIENT)
Dept: CARDIOLOGY | Age: 58
End: 2018-11-08
Payer: MEDICARE

## 2018-11-08 VITALS
BODY MASS INDEX: 38.65 KG/M2 | WEIGHT: 270 LBS | HEIGHT: 70 IN | DIASTOLIC BLOOD PRESSURE: 80 MMHG | HEART RATE: 66 BPM | SYSTOLIC BLOOD PRESSURE: 128 MMHG

## 2018-11-08 DIAGNOSIS — R07.89 OTHER CHEST PAIN: ICD-10-CM

## 2018-11-08 DIAGNOSIS — I10 ESSENTIAL HYPERTENSION: ICD-10-CM

## 2018-11-08 DIAGNOSIS — I48.0 PAROXYSMAL ATRIAL FIBRILLATION (HCC): Primary | ICD-10-CM

## 2018-11-08 LAB
INTERNATIONAL NORMALIZATION RATIO, POC: 3.6
PROTHROMBIN TIME, POC: ABNORMAL

## 2018-11-08 PROCEDURE — G8427 DOCREV CUR MEDS BY ELIG CLIN: HCPCS | Performed by: CLINICAL NURSE SPECIALIST

## 2018-11-08 PROCEDURE — 85610 PROTHROMBIN TIME: CPT | Performed by: CLINICAL NURSE SPECIALIST

## 2018-11-08 PROCEDURE — G8484 FLU IMMUNIZE NO ADMIN: HCPCS | Performed by: CLINICAL NURSE SPECIALIST

## 2018-11-08 PROCEDURE — 99214 OFFICE O/P EST MOD 30 MIN: CPT | Performed by: CLINICAL NURSE SPECIALIST

## 2018-11-08 PROCEDURE — 3017F COLORECTAL CA SCREEN DOC REV: CPT | Performed by: CLINICAL NURSE SPECIALIST

## 2018-11-08 PROCEDURE — G8417 CALC BMI ABV UP PARAM F/U: HCPCS | Performed by: CLINICAL NURSE SPECIALIST

## 2018-11-08 PROCEDURE — 1036F TOBACCO NON-USER: CPT | Performed by: CLINICAL NURSE SPECIALIST

## 2018-11-08 NOTE — PROGRESS NOTES
(hypertension)     COPD (chronic obstructive pulmonary disease) (HCC)      Past Medical History:   Diagnosis Date    Allergic rhinitis     Anxiety     Arthritis     Chronic back pain     COPD (chronic obstructive pulmonary disease) (HCC)     Depression     DVT (deep venous thrombosis) (HCC)     HTN (hypertension)     Hyperlipidemia     Lung cancer ([facility] [address] 5/2014    left upper lobe mass    Lung cancer ([facility] [address]     Paroxysmal atrial fibrillation ([facility] [address] 5/1/2013, 12/31/13 5/7/14  cardioversion    Tobacco abuse      Past Surgical History:   Procedure Laterality Date    BRONCHIAL BRUSH BIOPSY      BRONCHOSCOPY  5/7/14    Lung CA    CARDIOVERSION  10/13/11,4/2013, 12/31/13 5/7/14    COLONOSCOPY      HYSTERECTOMY      HYSTERECTOMY      KNEE ARTHROSCOPY Left     x3    LUNG BIOPSY Left 5/28/14    CT-guided needle biopsy    TONSILLECTOMY      TUNNELED VENOUS PORT PLACEMENT  06/2014     Family History   Problem Relation Age of Onset    Coronary Art Dis Mother         cabg and pacer     Heart Failure Mother     Kidney Disease Mother     Hypertension Father     Cancer Father         prostate    Cancer Sister         lung    Hypertension Brother     Cancer Paternal Uncle     Cancer Paternal Aunt      Social History   Substance Use Topics    Smoking status: Former Smoker     Packs/day: 1.00     Years: 38.00     Quit date: 3/23/2014    Smokeless tobacco: Never Used    Alcohol use Yes      Comment: occasional       Current Outpatient Prescriptions   Medication Sig Dispense Refill    hydrochlorothiazide (HYDRODIURIL) 25 MG tablet Take 1 tablet by mouth daily 30 tablet 1    sotalol (BETAPACE) 160 MG tablet TAKE 1 TABLET TWICE A  tablet 3    furosemide (LASIX) 20 MG tablet Take 20 mg by mouth daily as needed       promethazine (PHENERGAN) 25 MG tablet Take 25 mg by mouth every 6 hours as needed for Nausea      potassium chloride 10 MEQ/100ML Infuse 10 mEq intravenously once      warfarin

## 2018-11-13 PROBLEM — I50.22 CHRONIC SYSTOLIC (CONGESTIVE) HEART FAILURE (HCC): Status: ACTIVE | Noted: 2018-01-01

## 2018-11-13 PROBLEM — G89.4 CHRONIC PAIN SYNDROME: Status: ACTIVE | Noted: 2018-01-01

## 2018-11-13 PROBLEM — J30.1 SEASONAL ALLERGIC RHINITIS DUE TO POLLEN: Status: ACTIVE | Noted: 2018-01-01

## 2018-11-13 PROBLEM — Z87.891 PERSONAL HISTORY OF NICOTINE DEPENDENCE: Status: ACTIVE | Noted: 2018-01-01

## 2018-11-13 NOTE — PROGRESS NOTES
LATONIA Leon  [facility]   Respiratory Disease Clinic  [address] [place] [address]  Phone: [phone]  Fax: [phone]     Cary Ferris is a 57 y.o. female.   CC:   Chief Complaint   Patient presents with   • Follow-up     Patient here today for yearly follow up/check up. Patient states she is doing well. Patient states that her insurance is not covering her inhaler as well and the proair inhaler is not helping her symptoms.         HPI: Mrs. Ferris is coming in today for follow-up of her COPD and lung cancer.  She is followed by Dr. Erickson for her lung cancer and gets imaging twice a year.  She routinely does not take any bronchodilators outside of her Ventolin she has been unable to tolerate them.  She does utilize Claritin for chronic allergies and rhinitis.  It is notable that she is on sotalol.  Last office visit she was attempting to manage symptoms associated with kidney stones.  She indicates since her last visit she has had no more issues with kidney stones.  She still utilizes the Ventolin.  Her last refill the pharmacy they indicated that no longer cover Ventolin and gave her pro-air.  She has tried to use that a couple of times and does not feel like that is as beneficial as the Ventolin.  She did have a chest x-ray done prior to today's visit.  She is due to see Dr. Erickson tomorrow.     The following portions of the patient's history were reviewed and updated as appropriate: allergies, current medications, past family history, past medical history, past social history, past surgical history and problem list.    Past Medical History:   Diagnosis Date   • Adenocarcinoma of lung (CMS/HCC) 2014   • Anemia     d/t chemotherapy   • Anxiety    • Atrial fibrillation (CMS/HCC)    • Chronic pain    • COPD (chronic obstructive pulmonary disease) (CMS/HCC)    • Coronary artery disease    • Depression    • DVT (deep venous thrombosis) (CMS/HCC)     left leg, in  past   • Generalized headaches    • High serum carcinoembryonic antigen (CEA)    • History of radiation therapy     6300 cGy to lung completed 2014; prophylactic brain radiation 2600 cGy completed 2015   • Hx of degenerative disc disease    • Hyperglycemia    • Hyperlipidemia    • Hypertension    • Meningitis    • Morbid obesity (CMS/HCC)    • Nephrolithiasis    • Osteoarthritis    • Oxygen desaturation during sleep     pt states oxygen company came and got oxygen ,did not qualify for usage   • Personal history of chemotherapy     For Lung Cancer       Family History   Problem Relation Age of Onset   • Heart failure Mother    • Prostate cancer Father    • No Known Problems Sister    • Cancer Brother         skin cancer   • Lung cancer Sister    • Cancer Sister         skin cancer       Social History     Socioeconomic History   • Marital status:      Spouse name: Not on file   • Number of children: 0   • Years of education: Not on file   • Highest education level: Not on file   Social Needs   • Financial resource strain: Not on file   • Food insecurity - worry: Not on file   • Food insecurity - inability: Not on file   • Transportation needs - medical: Not on file   • Transportation needs - non-medical: Not on file   Occupational History   • Not on file   Tobacco Use   • Smoking status: Former Smoker     Packs/day: 1.50     Types: Cigarettes     Last attempt to quit: 2013     Years since quittin.9   • Smokeless tobacco: Never Used   Substance and Sexual Activity   • Alcohol use: No     Comment: social   • Drug use: No   • Sexual activity: Defer   Other Topics Concern   • Not on file   Social History Narrative   • Not on file       Review of Systems   Constitutional: Negative for activity change, chills, fatigue and fever.   HENT: Negative for congestion, postnasal drip, rhinorrhea, sinus pressure, sore throat and trouble swallowing.    Eyes: Negative for blurred vision, double vision and  pain.   Respiratory: Positive for cough and shortness of breath. Negative for chest tightness and wheezing.    Cardiovascular: Negative for chest pain, palpitations and leg swelling.   Gastrointestinal: Negative for abdominal distention, constipation, diarrhea, nausea and vomiting.   Endocrine: Negative for polydipsia, polyphagia and polyuria.   Genitourinary: Negative for dysuria, frequency and urgency.   Musculoskeletal: Positive for back pain, joint swelling and myalgias. Negative for arthralgias and gait problem.   Skin: Negative for color change, dry skin, rash and skin lesions.   Allergic/Immunologic: Negative for environmental allergies, food allergies and immunocompromised state.   Neurological: Negative for dizziness, seizures, speech difficulty, weakness, light-headedness, memory problem and confusion.   Hematological: Negative for adenopathy. Does not bruise/bleed easily.   Psychiatric/Behavioral: Negative for sleep disturbance, negative for hyperactivity and depressed mood. The patient is not nervous/anxious.        Vitals:    11/15/18 1325   BP: 136/84   Pulse: 68   SpO2: 96%       Physical Exam   Constitutional: She is oriented to person, place, and time. She appears well-developed and well-nourished. No distress.   HENT:   Head: Normocephalic and atraumatic.   Right Ear: External ear normal.   Left Ear: External ear normal.   Nose: Nose normal.   Mouth/Throat: Oropharynx is clear and moist. No oropharyngeal exudate.   Eyes: Conjunctivae and EOM are normal. Pupils are equal, round, and reactive to light. Right eye exhibits no discharge. Left eye exhibits no discharge.   Neck: Normal range of motion. Neck supple. No JVD present.   Cardiovascular: Normal rate and regular rhythm.   No murmur heard.  Pulmonary/Chest: Effort normal and breath sounds normal. No respiratory distress. She has no wheezes.   Abdominal: Soft. Bowel sounds are normal. She exhibits no distension. There is no tenderness.    Musculoskeletal: Normal range of motion. She exhibits no edema or deformity.   Neurological: She is alert and oriented to person, place, and time. She displays normal reflexes. No cranial nerve deficit. Coordination normal.   Skin: Skin is warm and dry. No rash noted. She is not diaphoretic. No erythema.   Psychiatric: She has a normal mood and affect. Her behavior is normal. Thought content normal.   Nursing note and vitals reviewed.      Pulmonary Functions Testing Results:     FEV1   Date Value Ref Range Status   11/15/2018 50% liters Final     FVC   Date Value Ref Range Status   11/15/2018 64% liters Final     FEV1/FVC   Date Value Ref Range Status   11/15/2018 62.81% % Final     PFTs reflect moderate obstructive disease, improved from previous    CXR: Chest x-ray performed at [facility] just now reflects a dual port to the left chest otherwise no abnormal infiltrates, effusions, or nodules.        Cary was seen today for follow-up.    Diagnoses and all orders for this visit:    Chronic obstructive pulmonary disease, unspecified COPD type (CMS/HCC)  -     Pulmonary Function Test; Future  -     Pulmonary Function Test    Small cell lung cancer (CMS/HCC)    Ureterolithiasis    Personal history of nicotine dependence    Chronic pain syndrome    Seasonal allergic rhinitis due to pollen    Chronic systolic (congestive) heart failure (CMS/HCC)      Patient's Body mass index is 38.17 kg/m². BMI is above normal parameters. Recommendations include: educational material.      LATONIA Leon  11/15/2018  1:57 PM    Return in about 6 months (around 5/15/2019).

## 2018-11-15 NOTE — PATIENT INSTRUCTIONS

## 2018-11-18 DIAGNOSIS — I48.91 ATRIAL FIBRILLATION, CONTROLLED (HCC): ICD-10-CM

## 2018-11-19 RX ORDER — WARFARIN SODIUM 5 MG/1
TABLET ORAL
Qty: 45 TABLET | Refills: 5 | Status: ON HOLD | OUTPATIENT
Start: 2018-11-19 | End: 2019-02-02 | Stop reason: HOSPADM

## 2018-11-29 DIAGNOSIS — I48.91 ATRIAL FIBRILLATION, UNSPECIFIED TYPE (HCC): ICD-10-CM

## 2018-11-29 RX ORDER — SOTALOL HYDROCHLORIDE 160 MG/1
TABLET ORAL
Qty: 180 TABLET | Refills: 3 | Status: ON HOLD | OUTPATIENT
Start: 2018-11-29 | End: 2019-02-08

## 2018-11-30 ENCOUNTER — HOSPITAL ENCOUNTER (OUTPATIENT)
Dept: NON INVASIVE DIAGNOSTICS | Age: 58
Discharge: HOME | End: 2018-11-30
Payer: MEDICARE

## 2018-11-30 ENCOUNTER — HOSPITAL ENCOUNTER (OUTPATIENT)
Dept: NUCLEAR MEDICINE | Age: 58
Discharge: HOME | End: 2018-12-02
Payer: MEDICARE

## 2018-11-30 DIAGNOSIS — R07.89 OTHER CHEST PAIN: ICD-10-CM

## 2018-11-30 DIAGNOSIS — I10 ESSENTIAL HYPERTENSION: ICD-10-CM

## 2018-11-30 DIAGNOSIS — I48.0 PAROXYSMAL ATRIAL FIBRILLATION (HCC): ICD-10-CM

## 2018-11-30 PROCEDURE — 93017 CV STRESS TEST TRACING ONLY: CPT

## 2018-11-30 PROCEDURE — 78452 HT MUSCLE IMAGE SPECT MULT: CPT

## 2018-11-30 PROCEDURE — 3430000000 HC RX DIAGNOSTIC RADIOPHARMACEUTICAL: Performed by: CLINICAL NURSE SPECIALIST

## 2018-11-30 PROCEDURE — 6360000002 HC RX W HCPCS: Performed by: CLINICAL NURSE SPECIALIST

## 2018-11-30 PROCEDURE — A9500 TC99M SESTAMIBI: HCPCS | Performed by: CLINICAL NURSE SPECIALIST

## 2018-11-30 RX ADMIN — TETRAKIS(2-METHOXYISOBUTYLISOCYANIDE)COPPER(I) TETRAFLUOROBORATE 10 MILLICURIE: 1 INJECTION, POWDER, LYOPHILIZED, FOR SOLUTION INTRAVENOUS at 14:29

## 2018-11-30 RX ADMIN — REGADENOSON 0.4 MG: 0.08 INJECTION, SOLUTION INTRAVENOUS at 14:30

## 2018-11-30 RX ADMIN — TETRAKIS(2-METHOXYISOBUTYLISOCYANIDE)COPPER(I) TETRAFLUOROBORATE 30 MILLICURIE: 1 INJECTION, POWDER, LYOPHILIZED, FOR SOLUTION INTRAVENOUS at 14:29

## 2018-12-03 LAB
LV EF: 69 %
LVEF MODALITY: NORMAL

## 2018-12-21 ENCOUNTER — ANTI-COAG VISIT (OUTPATIENT)
Dept: CARDIOLOGY | Age: 58
End: 2018-12-21
Payer: MEDICARE

## 2018-12-21 DIAGNOSIS — I48.0 PAROXYSMAL ATRIAL FIBRILLATION (HCC): Primary | ICD-10-CM

## 2018-12-21 LAB
INTERNATIONAL NORMALIZATION RATIO, POC: 4.3
PROTHROMBIN TIME, POC: NORMAL

## 2018-12-21 PROCEDURE — 85610 PROTHROMBIN TIME: CPT | Performed by: CLINICAL NURSE SPECIALIST

## 2018-12-26 ENCOUNTER — ANTI-COAG VISIT (OUTPATIENT)
Dept: CARDIOLOGY | Age: 58
End: 2018-12-26
Payer: MEDICARE

## 2018-12-26 DIAGNOSIS — I48.0 PAROXYSMAL ATRIAL FIBRILLATION (HCC): Primary | ICD-10-CM

## 2018-12-26 LAB
INTERNATIONAL NORMALIZATION RATIO, POC: 3.1
PROTHROMBIN TIME, POC: NORMAL

## 2018-12-26 PROCEDURE — 85610 PROTHROMBIN TIME: CPT | Performed by: CLINICAL NURSE SPECIALIST

## 2019-01-01 ENCOUNTER — OUTSIDE FACILITY SERVICE (OUTPATIENT)
Dept: PULMONOLOGY | Facility: CLINIC | Age: 59
End: 2019-01-01

## 2019-01-01 ENCOUNTER — TRANSCRIBE ORDERS (OUTPATIENT)
Dept: LAB | Facility: HOSPITAL | Age: 59
End: 2019-01-01

## 2019-01-01 ENCOUNTER — TRANSCRIBE ORDERS (OUTPATIENT)
Dept: ONCOLOGY | Facility: CLINIC | Age: 59
End: 2019-01-01

## 2019-01-01 ENCOUNTER — OFFICE VISIT (OUTPATIENT)
Dept: UROLOGY | Facility: CLINIC | Age: 59
End: 2019-01-01

## 2019-01-01 ENCOUNTER — OFFICE VISIT (OUTPATIENT)
Dept: PULMONOLOGY | Facility: CLINIC | Age: 59
End: 2019-01-01

## 2019-01-01 ENCOUNTER — APPOINTMENT (OUTPATIENT)
Dept: RADIATION ONCOLOGY | Facility: HOSPITAL | Age: 59
End: 2019-01-01

## 2019-01-01 ENCOUNTER — HOSPITAL ENCOUNTER (OUTPATIENT)
Dept: GENERAL RADIOLOGY | Facility: HOSPITAL | Age: 59
Discharge: HOME | End: 2019-02-12
Admitting: INTERNAL MEDICINE

## 2019-01-01 ENCOUNTER — APPOINTMENT (OUTPATIENT)
Dept: LAB | Facility: HOSPITAL | Age: 59
End: 2019-01-01

## 2019-01-01 ENCOUNTER — APPOINTMENT (OUTPATIENT)
Dept: ONCOLOGY | Facility: HOSPITAL | Age: 59
End: 2019-01-01

## 2019-01-01 ENCOUNTER — TELEPHONE (OUTPATIENT)
Dept: PULMONOLOGY | Facility: CLINIC | Age: 59
End: 2019-01-01

## 2019-01-01 VITALS — BODY MASS INDEX: 37.22 KG/M2 | TEMPERATURE: 96.3 F | HEIGHT: 70 IN | WEIGHT: 260 LBS

## 2019-01-01 VITALS
HEIGHT: 70 IN | HEART RATE: 67 BPM | BODY MASS INDEX: 35.65 KG/M2 | DIASTOLIC BLOOD PRESSURE: 74 MMHG | SYSTOLIC BLOOD PRESSURE: 122 MMHG | OXYGEN SATURATION: 93 % | WEIGHT: 249 LBS

## 2019-01-01 VITALS
SYSTOLIC BLOOD PRESSURE: 118 MMHG | OXYGEN SATURATION: 96 % | BODY MASS INDEX: 35.65 KG/M2 | HEIGHT: 70 IN | HEART RATE: 74 BPM | WEIGHT: 249 LBS | DIASTOLIC BLOOD PRESSURE: 70 MMHG

## 2019-01-01 DIAGNOSIS — Z87.891 PERSONAL HISTORY OF NICOTINE DEPENDENCE: ICD-10-CM

## 2019-01-01 DIAGNOSIS — J44.1 CHRONIC OBSTRUCTIVE PULMONARY DISEASE WITH ACUTE EXACERBATION (HCC): ICD-10-CM

## 2019-01-01 DIAGNOSIS — E66.01 MORBID OBESITY DUE TO EXCESS CALORIES (HCC): ICD-10-CM

## 2019-01-01 DIAGNOSIS — C34.90 ADENOCARCINOMA OF LUNG, UNSPECIFIED LATERALITY (HCC): Primary | ICD-10-CM

## 2019-01-01 DIAGNOSIS — J90 RECURRENT PLEURAL EFFUSION ON LEFT: ICD-10-CM

## 2019-01-01 DIAGNOSIS — I50.22 CHRONIC SYSTOLIC (CONGESTIVE) HEART FAILURE (HCC): ICD-10-CM

## 2019-01-01 DIAGNOSIS — C34.12 PANCOASTS SYNDROME, LEFT (HCC): Primary | ICD-10-CM

## 2019-01-01 DIAGNOSIS — R91.8 ENDOBRONCHIAL MASS: ICD-10-CM

## 2019-01-01 DIAGNOSIS — J30.1 SEASONAL ALLERGIC RHINITIS DUE TO POLLEN: ICD-10-CM

## 2019-01-01 DIAGNOSIS — C34.92 NON-SMALL CELL CANCER OF LEFT LUNG (HCC): Primary | ICD-10-CM

## 2019-01-01 DIAGNOSIS — C34.90 SMALL CELL LUNG CANCER (HCC): ICD-10-CM

## 2019-01-01 DIAGNOSIS — N20.0 NEPHROLITHIASIS: Primary | ICD-10-CM

## 2019-01-01 DIAGNOSIS — G89.4 CHRONIC PAIN SYNDROME: ICD-10-CM

## 2019-01-01 DIAGNOSIS — Z92.3 S/P RADIATION THERAPY: ICD-10-CM

## 2019-01-01 DIAGNOSIS — N20.0 NEPHROLITHIASIS: ICD-10-CM

## 2019-01-01 DIAGNOSIS — C34.90 SMALL CELL LUNG CANCER (HCC): Primary | ICD-10-CM

## 2019-01-01 DIAGNOSIS — R91.8 ENDOBRONCHIAL MASS: Primary | ICD-10-CM

## 2019-01-01 LAB
ALBUMIN SERPL-MCNC: 3.6 G/DL (ref 3.5–5)
ALBUMIN/GLOB SERPL: 1.3 G/DL (ref 1.1–2.5)
ALP SERPL-CCNC: 223 U/L (ref 24–120)
ALT SERPL W P-5'-P-CCNC: 57 U/L (ref 0–54)
ANION GAP SERPL CALCULATED.3IONS-SCNC: 11 MMOL/L (ref 4–13)
AST SERPL-CCNC: 94 U/L (ref 7–45)
BASOPHILS # BLD AUTO: 0.08 10*3/MM3 (ref 0–0.2)
BASOPHILS NFR BLD AUTO: 0.6 % (ref 0–2)
BILIRUB BLD-MCNC: ABNORMAL MG/DL
BILIRUB SERPL-MCNC: 0.6 MG/DL (ref 0.1–1)
BUN BLD-MCNC: 22 MG/DL (ref 5–21)
BUN/CREAT SERPL: 18 (ref 7–25)
CALCIUM SPEC-SCNC: 8.7 MG/DL (ref 8.4–10.4)
CEA SERPL-MCNC: 28.8 NG/ML (ref 0–5)
CHLORIDE SERPL-SCNC: 98 MMOL/L (ref 98–110)
CLARITY, POC: CLEAR
CO2 SERPL-SCNC: 27 MMOL/L (ref 24–31)
COLOR UR: YELLOW
CREAT BLD-MCNC: 1.22 MG/DL (ref 0.5–1.4)
DEPRECATED RDW RBC AUTO: 48.3 FL (ref 40–54)
EOSINOPHIL # BLD AUTO: 0.22 10*3/MM3 (ref 0–0.7)
EOSINOPHIL NFR BLD AUTO: 1.7 % (ref 0–4)
ERYTHROCYTE [DISTWIDTH] IN BLOOD BY AUTOMATED COUNT: 15.1 % (ref 12–15)
FERRITIN SERPL-MCNC: 421 NG/ML (ref 11.1–264)
FOLATE SERPL-MCNC: 7.26 NG/ML
GFR SERPL CREATININE-BSD FRML MDRD: 45 ML/MIN/1.73
GLOBULIN UR ELPH-MCNC: 2.7 GM/DL
GLUCOSE BLD-MCNC: 102 MG/DL (ref 70–100)
GLUCOSE UR STRIP-MCNC: NEGATIVE MG/DL
HAV IGM SERPL QL IA: NEGATIVE
HBV CORE IGM SERPL QL IA: NEGATIVE
HBV SURFACE AG SERPL QL IA: NEGATIVE
HCT VFR BLD AUTO: 36.7 % (ref 37–47)
HCV AB SER DONR QL: NEGATIVE
HCV S/C RATIO: 0 (ref 0–0.99)
HGB BLD-MCNC: 11.7 G/DL (ref 12–16)
IMM GRANULOCYTES # BLD AUTO: 0.12 10*3/MM3 (ref 0–0.05)
IMM GRANULOCYTES NFR BLD AUTO: 0.9 % (ref 0–5)
IRON 24H UR-MRATE: 35 MCG/DL (ref 42–180)
IRON SATN MFR SERPL: 13 % (ref 20–45)
KETONES UR QL: ABNORMAL
LEUKOCYTE EST, POC: ABNORMAL
LYMPHOCYTES # BLD AUTO: 1.04 10*3/MM3 (ref 0.72–4.86)
LYMPHOCYTES NFR BLD AUTO: 8.2 % (ref 15–45)
MCH RBC QN AUTO: 28.2 PG (ref 28–32)
MCHC RBC AUTO-ENTMCNC: 31.9 G/DL (ref 33–36)
MCV RBC AUTO: 88.4 FL (ref 82–98)
MONOCYTES # BLD AUTO: 1.43 10*3/MM3 (ref 0.19–1.3)
MONOCYTES NFR BLD AUTO: 11.3 % (ref 4–12)
NEUTROPHILS # BLD AUTO: 9.77 10*3/MM3 (ref 1.87–8.4)
NEUTROPHILS NFR BLD AUTO: 77.3 % (ref 39–78)
NITRITE UR-MCNC: NEGATIVE MG/ML
NRBC BLD AUTO-RTO: 0 /100 WBC (ref 0–0)
PH UR: 5.5 [PH] (ref 5–8)
PLATELET # BLD AUTO: 433 10*3/MM3 (ref 130–400)
PMV BLD AUTO: 11.2 FL (ref 6–12)
POTASSIUM BLD-SCNC: 5.1 MMOL/L (ref 3.5–5.3)
PROT SERPL-MCNC: 6.3 G/DL (ref 6.3–8.7)
PROT UR STRIP-MCNC: ABNORMAL MG/DL
RBC # BLD AUTO: 4.15 10*6/MM3 (ref 4.2–5.4)
RBC # UR STRIP: ABNORMAL /UL
SODIUM BLD-SCNC: 136 MMOL/L (ref 135–145)
SP GR UR: 1.03 (ref 1–1.03)
TIBC SERPL-MCNC: 267 MCG/DL (ref 225–420)
UROBILINOGEN UR QL: NORMAL
VIT B12 BLD-MCNC: 887 PG/ML (ref 239–931)
WBC NRBC COR # BLD: 12.66 10*3/MM3 (ref 4.8–10.8)

## 2019-01-01 PROCEDURE — 80074 ACUTE HEPATITIS PANEL: CPT | Performed by: INTERNAL MEDICINE

## 2019-01-01 PROCEDURE — 36415 COLL VENOUS BLD VENIPUNCTURE: CPT

## 2019-01-01 PROCEDURE — 99214 OFFICE O/P EST MOD 30 MIN: CPT | Performed by: NURSE PRACTITIONER

## 2019-01-01 PROCEDURE — 83550 IRON BINDING TEST: CPT | Performed by: INTERNAL MEDICINE

## 2019-01-01 PROCEDURE — 82746 ASSAY OF FOLIC ACID SERUM: CPT | Performed by: INTERNAL MEDICINE

## 2019-01-01 PROCEDURE — 80053 COMPREHEN METABOLIC PANEL: CPT | Performed by: INTERNAL MEDICINE

## 2019-01-01 PROCEDURE — 83540 ASSAY OF IRON: CPT | Performed by: INTERNAL MEDICINE

## 2019-01-01 PROCEDURE — 81001 URINALYSIS AUTO W/SCOPE: CPT | Performed by: UROLOGY

## 2019-01-01 PROCEDURE — 96372 THER/PROPH/DIAG INJ SC/IM: CPT | Performed by: NURSE PRACTITIONER

## 2019-01-01 PROCEDURE — 82607 VITAMIN B-12: CPT | Performed by: INTERNAL MEDICINE

## 2019-01-01 PROCEDURE — 71046 X-RAY EXAM CHEST 2 VIEWS: CPT

## 2019-01-01 PROCEDURE — 82728 ASSAY OF FERRITIN: CPT | Performed by: INTERNAL MEDICINE

## 2019-01-01 PROCEDURE — 31623 DX BRONCHOSCOPE/BRUSH: CPT | Performed by: INTERNAL MEDICINE

## 2019-01-01 PROCEDURE — 82378 CARCINOEMBRYONIC ANTIGEN: CPT | Performed by: INTERNAL MEDICINE

## 2019-01-01 PROCEDURE — 99213 OFFICE O/P EST LOW 20 MIN: CPT | Performed by: UROLOGY

## 2019-01-01 PROCEDURE — 31622 DX BRONCHOSCOPE/WASH: CPT | Performed by: INTERNAL MEDICINE

## 2019-01-01 PROCEDURE — 85025 COMPLETE CBC W/AUTO DIFF WBC: CPT | Performed by: INTERNAL MEDICINE

## 2019-01-01 RX ORDER — PROMETHAZINE HYDROCHLORIDE 25 MG/1
TABLET ORAL
COMMUNITY
Start: 2018-01-01

## 2019-01-01 RX ORDER — METHYLPREDNISOLONE ACETATE 80 MG/ML
80 INJECTION, SUSPENSION INTRA-ARTICULAR; INTRALESIONAL; INTRAMUSCULAR; SOFT TISSUE ONCE
Status: COMPLETED | OUTPATIENT
Start: 2019-01-01 | End: 2019-01-01

## 2019-01-01 RX ORDER — ALBUTEROL SULFATE 2.5 MG/3ML
2.5 SOLUTION RESPIRATORY (INHALATION) EVERY 4 HOURS PRN
Qty: 360 ML | Refills: 5 | Status: SHIPPED | OUTPATIENT
Start: 2019-01-01 | End: 2019-03-28

## 2019-01-01 RX ADMIN — METHYLPREDNISOLONE ACETATE 80 MG: 80 INJECTION, SUSPENSION INTRA-ARTICULAR; INTRALESIONAL; INTRAMUSCULAR; SOFT TISSUE at 15:58

## 2019-01-10 NOTE — PROGRESS NOTES
Subjective    Ms. Ferris is 58 y.o. female    Chief Complaint:Hematuria   History of Present Illness     Recurrent Nephrolithiasis  Patient is her for recurrent nephrolithiasis.  Location of stone is bilateral renal. Stones were found for workup of incidentally.  Pt is currently Asymptomatic.  Pt. Has had stone disease for 9month(s). Risk factors for stone disease include none identified. Previous management of stone disease was Ureteroscopy.  Stone analysis was Calcium Oxalate Dihydrate.  Metabolic workup revealed none.  Current treatment regimen includes hydration.  Associated symptoms include none currently.    The following portions of the patient's history were reviewed and updated as appropriate: allergies, current medications, past family history, past medical history, past social history, past surgical history and problem list.    Review of Systems   Constitutional: Negative for chills and fever.   Gastrointestinal: Negative for abdominal pain, anal bleeding and blood in stool.   Genitourinary: Negative for decreased urine volume, difficulty urinating, dyspareunia, dysuria, enuresis, flank pain, frequency, genital sores, hematuria, menstrual problem, pelvic pain, urgency, vaginal bleeding, vaginal discharge and vaginal pain.         Current Outpatient Medications:   •  albuterol (PROVENTIL HFA;VENTOLIN HFA) 108 (90 BASE) MCG/ACT inhaler, Inhale 2 puffs Every 4 (Four) Hours As Needed for wheezing., Disp: , Rfl:   •  B Complex Vitamins (VITAMIN B COMPLEX PO), Take  by mouth., Disp: , Rfl:   •  celecoxib (CeleBREX) 200 MG capsule, Take 200 mg by mouth Daily., Disp: , Rfl:   •  diclofenac (VOLTAREN) 1 % gel gel, Apply 4 g topically 4 (Four) Times a Day As Needed., Disp: , Rfl:   •  furosemide (LASIX) 20 MG tablet, Take 20 mg by mouth Daily As Needed., Disp: , Rfl:   •  HYDROcodone-acetaminophen (NORCO)  MG per tablet, Take 1 tablet by mouth Every 6 (Six) Hours As Needed for moderate pain (4-6)., Disp: ,  Rfl:   •  LORazepam (ATIVAN) 1 MG tablet, Take 1 mg by mouth Every 8 (Eight) Hours As Needed for anxiety., Disp: , Rfl:   •  losartan (COZAAR) 50 MG tablet, Take 100 mg by mouth Daily., Disp: , Rfl:   •  Multiple Vitamins-Minerals (MULTIVITAMIN ADULT PO), Take 1 tablet by mouth Daily., Disp: , Rfl:   •  polyethylene glycol (MIRALAX) packet, Take 17 g by mouth Daily., Disp: , Rfl:   •  potassium chloride (K-DUR,KLOR-CON) 20 MEQ CR tablet, Take 20 mEq by mouth Daily As Needed., Disp: , Rfl:   •  pravastatin (PRAVACHOL) 20 MG tablet, Take 20 mg by mouth Daily., Disp: , Rfl:   •  raNITIdine (ZANTAC) 150 MG tablet, Take 150 mg by mouth 2 (Two) Times a Day., Disp: , Rfl:   •  Sennosides 8.6 MG capsule, Take  by mouth As Needed., Disp: , Rfl:   •  sertraline (ZOLOFT) 100 MG tablet, Take 100 mg by mouth Daily., Disp: , Rfl:   •  sotalol (BETAPACE) 160 MG tablet, Take 160 mg by mouth 2 (Two) Times a Day., Disp: , Rfl:   •  temazepam (RESTORIL) 30 MG capsule, Take 30 mg by mouth At Night As Needed for sleep., Disp: , Rfl:   •  tiZANidine (ZANAFLEX) 4 MG tablet, Take 4 mg by mouth At Night As Needed for muscle spasms., Disp: , Rfl:   •  warfarin (COUMADIN) 2.5 MG tablet, Take 2.5 mg by mouth. mon ,wed fri, sat, Disp: , Rfl:   •  warfarin (COUMADIN) 5 MG tablet, Take 5 mg by mouth. Sun tues thurs, Disp: , Rfl:     Past Medical History:   Diagnosis Date   • Adenocarcinoma of lung (CMS/HCC) 2014   • Anemia     d/t chemotherapy   • Anxiety    • Atrial fibrillation (CMS/HCC)    • Chronic pain    • COPD (chronic obstructive pulmonary disease) (CMS/HCC)    • Coronary artery disease    • Depression    • DVT (deep venous thrombosis) (CMS/HCC)     left leg, in past   • Generalized headaches    • High serum carcinoembryonic antigen (CEA)    • History of radiation therapy     6300 cGy to lung completed 8-6-2014; prophylactic brain radiation 2600 cGy completed 4-   • Hx of degenerative disc disease    • Hyperglycemia    •  "Hyperlipidemia    • Hypertension    • Meningitis    • Morbid obesity (CMS/HCC)    • Nephrolithiasis    • Osteoarthritis    • Oxygen desaturation during sleep     pt states oxygen company came and got oxygen ,did not qualify for usage   • Personal history of chemotherapy     For Lung Cancer       Past Surgical History:   Procedure Laterality Date   • BRONCHOSCOPY     • CARDIOVERSION         • COLONOSCOPY     • KNEE SURGERY Left     x 3   • PARTIAL HYSTERECTOMY  2003   • PORTACATH PLACEMENT Left    • SKIN LESION EXCISION      Negative for malignacy   • TONSILLECTOMY     • URETEROSCOPY LASER LITHOTRIPSY WITH STENT INSERTION Right 2018    Procedure: CYSTO, URETEROSCOPY LASER LITHOTRIPSY WITH STENT INSERTION, RETROGRADE PYELOGRAM,;  Surgeon: Grzegorz Landaverde MD;  Location: [facility] OR;  Service: Urology       Social History     Socioeconomic History   • Marital status:      Spouse name: Not on file   • Number of children: 0   • Years of education: Not on file   • Highest education level: Not on file   Tobacco Use   • Smoking status: Former Smoker     Packs/day: 1.50     Types: Cigarettes     Last attempt to quit: 2013     Years since quittin.1   • Smokeless tobacco: Never Used   Substance and Sexual Activity   • Alcohol use: No     Comment: social   • Drug use: No   • Sexual activity: Defer       Family History   Problem Relation Age of Onset   • Heart failure Mother    • Prostate cancer Father    • No Known Problems Sister    • Cancer Brother         skin cancer   • Lung cancer Sister    • Cancer Sister         skin cancer       Objective    Temp 96.3 °F (35.7 °C)   Ht 177.8 cm (70\")   Wt 118 kg (260 lb)   LMP  (LMP Unknown)   BMI 37.31 kg/m²     Physical Exam   Constitutional: She is oriented to person, place, and time. She appears well-developed and well-nourished. No distress.   Pulmonary/Chest: Effort normal.   Abdominal: Soft. She exhibits no distension and no mass. There is no " tenderness. There is no rebound and no guarding. No hernia.   Neurological: She is alert and oriented to person, place, and time.   Skin: Skin is warm and dry. She is not diaphoretic.   Psychiatric: She has a normal mood and affect.   Vitals reviewed.          Results for orders placed or performed in visit on 01/11/19   POC Urinalysis Dipstick, Multipro   Result Value Ref Range    Color Yellow Yellow, Straw, Dark Yellow, Stephanie    Clarity, UA Clear Clear    Glucose, UA Negative Negative, 1000 mg/dL (3+) mg/dL    Bilirubin Small (1+) (A) Negative    Ketones, UA Trace (A) Negative    Specific Gravity  1.030 1.005 - 1.030    Blood, UA Small (A) Negative    pH, Urine 5.5 5.0 - 8.0    Protein, POC 30 mg/dL (A) Negative mg/dL    Urobilinogen, UA Normal Normal    Nitrite, UA Negative Negative    Leukocytes Trace (A) Negative   Patient's Body mass index is 37.31 kg/m². BMI is above normal parameters. Recommendations include: educational material.    Assessment and Plan    Diagnoses and all orders for this visit:    Nephrolithiasis  -     POC Urinalysis Dipstick, Multipro  -     XR Abdomen KUB; Future    Patient with a history of a 7mm stone in her right proximal ureter an 8 mm stone in her upper pole.  She underwent ureteroscopic management of the stones on 05/21/2018.     8mm stone in upper pole on right stable.  She is not a great surgical candidate.  Since she is asymptomatic I think the best course is to watch this.  Follow up in one year with KUB.

## 2019-01-11 NOTE — PATIENT INSTRUCTIONS

## 2019-01-24 ENCOUNTER — ANTI-COAG VISIT (OUTPATIENT)
Dept: CARDIOLOGY | Age: 59
End: 2019-01-24
Payer: MEDICARE

## 2019-01-24 DIAGNOSIS — I48.0 PAROXYSMAL ATRIAL FIBRILLATION (HCC): Primary | ICD-10-CM

## 2019-01-24 LAB
INR BLD: 6.43 (ref 0.88–1.18)
INTERNATIONAL NORMALIZATION RATIO, POC: NORMAL
PROTHROMBIN TIME, POC: NORMAL
PROTHROMBIN TIME: 55.9 SEC (ref 12–14.6)

## 2019-01-24 PROCEDURE — 85610 PROTHROMBIN TIME: CPT | Performed by: NURSE PRACTITIONER

## 2019-01-25 ENCOUNTER — APPOINTMENT (OUTPATIENT)
Dept: GENERAL RADIOLOGY | Age: 59
DRG: 180 | End: 2019-01-25
Payer: MEDICARE

## 2019-01-25 ENCOUNTER — APPOINTMENT (OUTPATIENT)
Dept: CT IMAGING | Age: 59
DRG: 180 | End: 2019-01-25
Payer: MEDICARE

## 2019-01-25 ENCOUNTER — HOSPITAL ENCOUNTER (INPATIENT)
Age: 59
LOS: 8 days | Discharge: HOME | DRG: 180 | End: 2019-02-02
Attending: EMERGENCY MEDICINE | Admitting: HOSPITALIST
Payer: MEDICARE

## 2019-01-25 DIAGNOSIS — J96.01 ACUTE RESPIRATORY FAILURE WITH HYPOXIA (HCC): Primary | ICD-10-CM

## 2019-01-25 DIAGNOSIS — J90 PLEURAL EFFUSION ON LEFT: ICD-10-CM

## 2019-01-25 DIAGNOSIS — D68.32 WARFARIN-INDUCED COAGULOPATHY (HCC): ICD-10-CM

## 2019-01-25 DIAGNOSIS — T45.515A WARFARIN-INDUCED COAGULOPATHY (HCC): ICD-10-CM

## 2019-01-25 DIAGNOSIS — Z85.118 HISTORY OF LUNG CANCER: ICD-10-CM

## 2019-01-25 DIAGNOSIS — R93.89 ABNORMAL CHEST CT: ICD-10-CM

## 2019-01-25 DIAGNOSIS — J40 BRONCHITIS: ICD-10-CM

## 2019-01-25 PROBLEM — J18.9 PNEUMONIA: Status: ACTIVE | Noted: 2019-01-25

## 2019-01-25 LAB
ABO/RH: NORMAL
ALBUMIN SERPL-MCNC: 4.2 G/DL (ref 3.5–5.2)
ALP BLD-CCNC: 95 U/L (ref 35–104)
ALT SERPL-CCNC: 26 U/L (ref 5–33)
ANION GAP SERPL CALCULATED.3IONS-SCNC: 15 MMOL/L (ref 7–19)
ANTIBODY SCREEN: NORMAL
AST SERPL-CCNC: 37 U/L (ref 5–32)
BASE EXCESS ARTERIAL: 1 MMOL/L (ref -2–2)
BASOPHILS ABSOLUTE: 0.1 K/UL (ref 0–0.2)
BASOPHILS RELATIVE PERCENT: 0.6 % (ref 0–1)
BILIRUB SERPL-MCNC: 0.3 MG/DL (ref 0.2–1.2)
BUN BLDV-MCNC: 10 MG/DL (ref 6–20)
CALCIUM SERPL-MCNC: 9.2 MG/DL (ref 8.6–10)
CARBOXYHEMOGLOBIN ARTERIAL: 1.8 % (ref 0–5)
CHLORIDE BLD-SCNC: 102 MMOL/L (ref 98–111)
CO2: 22 MMOL/L (ref 22–29)
CREAT SERPL-MCNC: 0.6 MG/DL (ref 0.5–0.9)
EOSINOPHILS ABSOLUTE: 0.1 K/UL (ref 0–0.6)
EOSINOPHILS RELATIVE PERCENT: 0.6 % (ref 0–5)
GFR NON-AFRICAN AMERICAN: >60
GLUCOSE BLD-MCNC: 135 MG/DL (ref 74–109)
HCO3 ARTERIAL: 26 MMOL/L (ref 22–26)
HCT VFR BLD CALC: 41.5 % (ref 37–47)
HEMOGLOBIN, ART, EXTENDED: 14.1 G/DL (ref 12–16)
HEMOGLOBIN: 13.3 G/DL (ref 12–16)
INR BLD: 5.68 (ref 0.88–1.18)
LACTIC ACID: 1.7 MMOL/L (ref 0.5–1.9)
LYMPHOCYTES ABSOLUTE: 0.9 K/UL (ref 1.1–4.5)
LYMPHOCYTES RELATIVE PERCENT: 9.2 % (ref 20–40)
MAGNESIUM: 1.5 MG/DL (ref 1.6–2.6)
MCH RBC QN AUTO: 28.8 PG (ref 27–31)
MCHC RBC AUTO-ENTMCNC: 32 G/DL (ref 33–37)
MCV RBC AUTO: 89.8 FL (ref 81–99)
METHEMOGLOBIN ARTERIAL: 0.9 %
MONOCYTES ABSOLUTE: 0.7 K/UL (ref 0–0.9)
MONOCYTES RELATIVE PERCENT: 7.1 % (ref 0–10)
NEUTROPHILS ABSOLUTE: 7.8 K/UL (ref 1.5–7.5)
NEUTROPHILS RELATIVE PERCENT: 82.3 % (ref 50–65)
O2 CONTENT ARTERIAL: 17.9 ML/DL
O2 SAT, ARTERIAL: 90.3 %
O2 THERAPY: ABNORMAL
PCO2 ARTERIAL: 42 MMHG (ref 35–45)
PDW BLD-RTO: 13.7 % (ref 11.5–14.5)
PH ARTERIAL: 7.4 (ref 7.35–7.45)
PLATELET # BLD: 305 K/UL (ref 130–400)
PMV BLD AUTO: 10.9 FL (ref 9.4–12.3)
PO2 ARTERIAL: 57 MMHG (ref 80–100)
POTASSIUM SERPL-SCNC: 4.5 MMOL/L (ref 3.5–5)
POTASSIUM, WHOLE BLOOD: 4.3
PRO-BNP: 157 PG/ML (ref 0–900)
PROTHROMBIN TIME: 50.6 SEC (ref 12–14.6)
RAPID INFLUENZA  B AGN: NEGATIVE
RAPID INFLUENZA A AGN: NEGATIVE
RBC # BLD: 4.62 M/UL (ref 4.2–5.4)
SODIUM BLD-SCNC: 139 MMOL/L (ref 136–145)
TOTAL PROTEIN: 6.6 G/DL (ref 6.6–8.7)
TROPONIN: <0.01 NG/ML (ref 0–0.03)
TSH SERPL DL<=0.05 MIU/L-ACNC: 0.64 UIU/ML (ref 0.27–4.2)
WBC # BLD: 9.5 K/UL (ref 4.8–10.8)

## 2019-01-25 PROCEDURE — 36600 WITHDRAWAL OF ARTERIAL BLOOD: CPT

## 2019-01-25 PROCEDURE — 86850 RBC ANTIBODY SCREEN: CPT

## 2019-01-25 PROCEDURE — 86901 BLOOD TYPING SEROLOGIC RH(D): CPT

## 2019-01-25 PROCEDURE — 2580000003 HC RX 258: Performed by: HOSPITALIST

## 2019-01-25 PROCEDURE — 84132 ASSAY OF SERUM POTASSIUM: CPT

## 2019-01-25 PROCEDURE — 99285 EMERGENCY DEPT VISIT HI MDM: CPT | Performed by: EMERGENCY MEDICINE

## 2019-01-25 PROCEDURE — 99222 1ST HOSP IP/OBS MODERATE 55: CPT | Performed by: HOSPITALIST

## 2019-01-25 PROCEDURE — 2580000003 HC RX 258: Performed by: NURSE PRACTITIONER

## 2019-01-25 PROCEDURE — 84443 ASSAY THYROID STIM HORMONE: CPT

## 2019-01-25 PROCEDURE — 6360000002 HC RX W HCPCS: Performed by: HOSPITALIST

## 2019-01-25 PROCEDURE — 86900 BLOOD TYPING SEROLOGIC ABO: CPT

## 2019-01-25 PROCEDURE — 87040 BLOOD CULTURE FOR BACTERIA: CPT

## 2019-01-25 PROCEDURE — 6370000000 HC RX 637 (ALT 250 FOR IP): Performed by: HOSPITALIST

## 2019-01-25 PROCEDURE — 87205 SMEAR GRAM STAIN: CPT

## 2019-01-25 PROCEDURE — 84484 ASSAY OF TROPONIN QUANT: CPT

## 2019-01-25 PROCEDURE — 6360000002 HC RX W HCPCS: Performed by: NURSE PRACTITIONER

## 2019-01-25 PROCEDURE — 6370000000 HC RX 637 (ALT 250 FOR IP): Performed by: NURSE PRACTITIONER

## 2019-01-25 PROCEDURE — 2700000000 HC OXYGEN THERAPY PER DAY

## 2019-01-25 PROCEDURE — 80053 COMPREHEN METABOLIC PANEL: CPT

## 2019-01-25 PROCEDURE — 82803 BLOOD GASES ANY COMBINATION: CPT

## 2019-01-25 PROCEDURE — 93005 ELECTROCARDIOGRAM TRACING: CPT

## 2019-01-25 PROCEDURE — 6360000004 HC RX CONTRAST MEDICATION: Performed by: NURSE PRACTITIONER

## 2019-01-25 PROCEDURE — 2580000003 HC RX 258: Performed by: EMERGENCY MEDICINE

## 2019-01-25 PROCEDURE — 83605 ASSAY OF LACTIC ACID: CPT

## 2019-01-25 PROCEDURE — 85025 COMPLETE CBC W/AUTO DIFF WBC: CPT

## 2019-01-25 PROCEDURE — 99285 EMERGENCY DEPT VISIT HI MDM: CPT

## 2019-01-25 PROCEDURE — 71045 X-RAY EXAM CHEST 1 VIEW: CPT

## 2019-01-25 PROCEDURE — 1210000000 HC MED SURG R&B

## 2019-01-25 PROCEDURE — 87070 CULTURE OTHR SPECIMN AEROBIC: CPT

## 2019-01-25 PROCEDURE — 83735 ASSAY OF MAGNESIUM: CPT

## 2019-01-25 PROCEDURE — 85610 PROTHROMBIN TIME: CPT

## 2019-01-25 PROCEDURE — 94664 DEMO&/EVAL PT USE INHALER: CPT

## 2019-01-25 PROCEDURE — 71275 CT ANGIOGRAPHY CHEST: CPT

## 2019-01-25 PROCEDURE — 87804 INFLUENZA ASSAY W/OPTIC: CPT

## 2019-01-25 PROCEDURE — 96374 THER/PROPH/DIAG INJ IV PUSH: CPT

## 2019-01-25 PROCEDURE — 94640 AIRWAY INHALATION TREATMENT: CPT

## 2019-01-25 PROCEDURE — 36415 COLL VENOUS BLD VENIPUNCTURE: CPT

## 2019-01-25 PROCEDURE — 83880 ASSAY OF NATRIURETIC PEPTIDE: CPT

## 2019-01-25 RX ORDER — SODIUM CHLORIDE 0.9 % (FLUSH) 0.9 %
10 SYRINGE (ML) INJECTION PRN
Status: DISCONTINUED | OUTPATIENT
Start: 2019-01-25 | End: 2019-02-02 | Stop reason: HOSPADM

## 2019-01-25 RX ORDER — IPRATROPIUM BROMIDE AND ALBUTEROL SULFATE 2.5; .5 MG/3ML; MG/3ML
1 SOLUTION RESPIRATORY (INHALATION) 4 TIMES DAILY
Status: DISCONTINUED | OUTPATIENT
Start: 2019-01-25 | End: 2019-02-02 | Stop reason: HOSPADM

## 2019-01-25 RX ORDER — 0.9 % SODIUM CHLORIDE 0.9 %
500 INTRAVENOUS SOLUTION INTRAVENOUS ONCE
Status: COMPLETED | OUTPATIENT
Start: 2019-01-25 | End: 2019-01-25

## 2019-01-25 RX ORDER — SODIUM CHLORIDE 0.9 % (FLUSH) 0.9 %
10 SYRINGE (ML) INJECTION EVERY 12 HOURS SCHEDULED
Status: DISCONTINUED | OUTPATIENT
Start: 2019-01-25 | End: 2019-02-02 | Stop reason: HOSPADM

## 2019-01-25 RX ORDER — ONDANSETRON 2 MG/ML
4 INJECTION INTRAMUSCULAR; INTRAVENOUS EVERY 6 HOURS PRN
Status: DISCONTINUED | OUTPATIENT
Start: 2019-01-25 | End: 2019-01-25

## 2019-01-25 RX ORDER — ACETAMINOPHEN 325 MG/1
650 TABLET ORAL EVERY 4 HOURS PRN
Status: DISCONTINUED | OUTPATIENT
Start: 2019-01-25 | End: 2019-02-02 | Stop reason: HOSPADM

## 2019-01-25 RX ORDER — SOTALOL HYDROCHLORIDE 80 MG/1
160 TABLET ORAL 2 TIMES DAILY
Status: DISCONTINUED | OUTPATIENT
Start: 2019-01-25 | End: 2019-02-02 | Stop reason: HOSPADM

## 2019-01-25 RX ORDER — IPRATROPIUM BROMIDE AND ALBUTEROL SULFATE 2.5; .5 MG/3ML; MG/3ML
1 SOLUTION RESPIRATORY (INHALATION) ONCE
Status: COMPLETED | OUTPATIENT
Start: 2019-01-25 | End: 2019-01-25

## 2019-01-25 RX ORDER — LORAZEPAM 0.5 MG/1
0.5 TABLET ORAL 3 TIMES DAILY
Status: DISCONTINUED | OUTPATIENT
Start: 2019-01-25 | End: 2019-02-02 | Stop reason: HOSPADM

## 2019-01-25 RX ORDER — MAGNESIUM SULFATE 1 G/100ML
1 INJECTION INTRAVENOUS ONCE
Status: COMPLETED | OUTPATIENT
Start: 2019-01-25 | End: 2019-01-25

## 2019-01-25 RX ADMIN — IPRATROPIUM BROMIDE AND ALBUTEROL SULFATE 1 AMPULE: .5; 3 SOLUTION RESPIRATORY (INHALATION) at 18:32

## 2019-01-25 RX ADMIN — Medication 2 G: at 20:57

## 2019-01-25 RX ADMIN — ACETAMINOPHEN 650 MG: 325 TABLET ORAL at 17:26

## 2019-01-25 RX ADMIN — Medication 10 ML: at 20:58

## 2019-01-25 RX ADMIN — MAGNESIUM SULFATE HEPTAHYDRATE 1 G: 1 INJECTION, SOLUTION INTRAVENOUS at 18:07

## 2019-01-25 RX ADMIN — SOTALOL HYDROCHLORIDE 160 MG: 80 TABLET ORAL at 20:58

## 2019-01-25 RX ADMIN — VANCOMYCIN HYDROCHLORIDE 1750 MG: 10 INJECTION, POWDER, LYOPHILIZED, FOR SOLUTION INTRAVENOUS at 13:57

## 2019-01-25 RX ADMIN — LORAZEPAM 0.5 MG: 0.5 TABLET ORAL at 17:26

## 2019-01-25 RX ADMIN — IOPAMIDOL 90 ML: 755 INJECTION, SOLUTION INTRAVENOUS at 14:28

## 2019-01-25 RX ADMIN — CEFEPIME HYDROCHLORIDE 2 G: 2 INJECTION, POWDER, FOR SOLUTION INTRAVENOUS at 13:56

## 2019-01-25 RX ADMIN — IPRATROPIUM BROMIDE AND ALBUTEROL SULFATE 1 AMPULE: .5; 3 SOLUTION RESPIRATORY (INHALATION) at 13:46

## 2019-01-25 RX ADMIN — LORAZEPAM 0.5 MG: 0.5 TABLET ORAL at 20:58

## 2019-01-25 RX ADMIN — SODIUM CHLORIDE 500 ML: 9 INJECTION, SOLUTION INTRAVENOUS at 13:56

## 2019-01-25 ASSESSMENT — ENCOUNTER SYMPTOMS
SINUS PRESSURE: 0
SHORTNESS OF BREATH: 1
NAUSEA: 0
DIARRHEA: 0
SORE THROAT: 0
VOMITING: 0
TROUBLE SWALLOWING: 0
RHINORRHEA: 0
COUGH: 1
CONSTIPATION: 0
ABDOMINAL PAIN: 0

## 2019-01-25 ASSESSMENT — PAIN DESCRIPTION - ORIENTATION
ORIENTATION: LEFT;RIGHT
ORIENTATION: LEFT

## 2019-01-25 ASSESSMENT — PAIN DESCRIPTION - PAIN TYPE
TYPE: ACUTE PAIN

## 2019-01-25 ASSESSMENT — PAIN DESCRIPTION - LOCATION
LOCATION: HEAD;RIB CAGE
LOCATION: RIB CAGE

## 2019-01-25 ASSESSMENT — PAIN SCALES - GENERAL
PAINLEVEL_OUTOF10: 0
PAINLEVEL_OUTOF10: 0
PAINLEVEL_OUTOF10: 7
PAINLEVEL_OUTOF10: 0

## 2019-01-26 PROBLEM — R59.1 LYMPHADENOPATHY: Status: ACTIVE | Noted: 2019-01-26

## 2019-01-26 PROBLEM — J96.21 ACUTE AND CHRONIC RESPIRATORY FAILURE WITH HYPOXIA (HCC): Status: ACTIVE | Noted: 2019-01-26

## 2019-01-26 PROBLEM — J90 PLEURAL EFFUSION: Status: ACTIVE | Noted: 2019-01-26

## 2019-01-26 PROBLEM — R91.1 PULMONARY NODULE: Status: ACTIVE | Noted: 2019-01-26

## 2019-01-26 LAB
ANION GAP SERPL CALCULATED.3IONS-SCNC: 10 MMOL/L (ref 7–19)
BUN BLDV-MCNC: 7 MG/DL (ref 6–20)
CALCIUM SERPL-MCNC: 9.3 MG/DL (ref 8.6–10)
CHLORIDE BLD-SCNC: 103 MMOL/L (ref 98–111)
CO2: 27 MMOL/L (ref 22–29)
CREAT SERPL-MCNC: 0.5 MG/DL (ref 0.5–0.9)
GFR NON-AFRICAN AMERICAN: >60
GLUCOSE BLD-MCNC: 101 MG/DL (ref 74–109)
HCT VFR BLD CALC: 39.7 % (ref 37–47)
HEMOGLOBIN: 12.7 G/DL (ref 12–16)
INR BLD: 5.52 (ref 0.88–1.18)
MCH RBC QN AUTO: 28.8 PG (ref 27–31)
MCHC RBC AUTO-ENTMCNC: 32 G/DL (ref 33–37)
MCV RBC AUTO: 90 FL (ref 81–99)
PDW BLD-RTO: 13.5 % (ref 11.5–14.5)
PLATELET # BLD: 298 K/UL (ref 130–400)
PMV BLD AUTO: 10.9 FL (ref 9.4–12.3)
POTASSIUM REFLEX MAGNESIUM: 4.4 MMOL/L (ref 3.5–5)
PROTHROMBIN TIME: 49.4 SEC (ref 12–14.6)
RBC # BLD: 4.41 M/UL (ref 4.2–5.4)
SODIUM BLD-SCNC: 140 MMOL/L (ref 136–145)
WBC # BLD: 10.5 K/UL (ref 4.8–10.8)

## 2019-01-26 PROCEDURE — 6370000000 HC RX 637 (ALT 250 FOR IP): Performed by: INTERNAL MEDICINE

## 2019-01-26 PROCEDURE — 6360000002 HC RX W HCPCS: Performed by: HOSPITALIST

## 2019-01-26 PROCEDURE — 99233 SBSQ HOSP IP/OBS HIGH 50: CPT | Performed by: INTERNAL MEDICINE

## 2019-01-26 PROCEDURE — 85610 PROTHROMBIN TIME: CPT

## 2019-01-26 PROCEDURE — 6370000000 HC RX 637 (ALT 250 FOR IP): Performed by: HOSPITALIST

## 2019-01-26 PROCEDURE — 92610 EVALUATE SWALLOWING FUNCTION: CPT

## 2019-01-26 PROCEDURE — 80048 BASIC METABOLIC PNL TOTAL CA: CPT

## 2019-01-26 PROCEDURE — 94760 N-INVAS EAR/PLS OXIMETRY 1: CPT

## 2019-01-26 PROCEDURE — 2700000000 HC OXYGEN THERAPY PER DAY

## 2019-01-26 PROCEDURE — 2580000003 HC RX 258: Performed by: HOSPITALIST

## 2019-01-26 PROCEDURE — 94640 AIRWAY INHALATION TREATMENT: CPT

## 2019-01-26 PROCEDURE — 1210000000 HC MED SURG R&B

## 2019-01-26 PROCEDURE — 85027 COMPLETE CBC AUTOMATED: CPT

## 2019-01-26 PROCEDURE — 99221 1ST HOSP IP/OBS SF/LOW 40: CPT | Performed by: THORACIC SURGERY (CARDIOTHORACIC VASCULAR SURGERY)

## 2019-01-26 RX ORDER — HYDROCODONE BITARTRATE AND ACETAMINOPHEN 10; 325 MG/1; MG/1
1 TABLET ORAL EVERY 6 HOURS PRN
Status: DISCONTINUED | OUTPATIENT
Start: 2019-01-26 | End: 2019-02-02 | Stop reason: HOSPADM

## 2019-01-26 RX ORDER — BENZONATATE 100 MG/1
100 CAPSULE ORAL ONCE
Status: COMPLETED | OUTPATIENT
Start: 2019-01-26 | End: 2019-01-26

## 2019-01-26 RX ORDER — TIZANIDINE 4 MG/1
4 TABLET ORAL EVERY 6 HOURS PRN
Status: DISCONTINUED | OUTPATIENT
Start: 2019-01-26 | End: 2019-01-31

## 2019-01-26 RX ADMIN — IPRATROPIUM BROMIDE AND ALBUTEROL SULFATE 1 AMPULE: .5; 3 SOLUTION RESPIRATORY (INHALATION) at 14:59

## 2019-01-26 RX ADMIN — IPRATROPIUM BROMIDE AND ALBUTEROL SULFATE 1 AMPULE: .5; 3 SOLUTION RESPIRATORY (INHALATION) at 10:39

## 2019-01-26 RX ADMIN — LORAZEPAM 0.5 MG: 0.5 TABLET ORAL at 13:38

## 2019-01-26 RX ADMIN — HYDROCODONE BITARTRATE AND ACETAMINOPHEN 1 TABLET: 10; 325 TABLET ORAL at 14:27

## 2019-01-26 RX ADMIN — VANCOMYCIN HYDROCHLORIDE 1750 MG: 10 INJECTION, POWDER, LYOPHILIZED, FOR SOLUTION INTRAVENOUS at 02:18

## 2019-01-26 RX ADMIN — LORAZEPAM 0.5 MG: 0.5 TABLET ORAL at 09:00

## 2019-01-26 RX ADMIN — Medication 2 G: at 22:58

## 2019-01-26 RX ADMIN — Medication 2 G: at 05:45

## 2019-01-26 RX ADMIN — BENZONATATE 100 MG: 100 CAPSULE ORAL at 05:00

## 2019-01-26 RX ADMIN — IPRATROPIUM BROMIDE AND ALBUTEROL SULFATE 1 AMPULE: .5; 3 SOLUTION RESPIRATORY (INHALATION) at 18:46

## 2019-01-26 RX ADMIN — Medication 10 ML: at 09:00

## 2019-01-26 RX ADMIN — VANCOMYCIN HYDROCHLORIDE 1750 MG: 10 INJECTION, POWDER, LYOPHILIZED, FOR SOLUTION INTRAVENOUS at 13:38

## 2019-01-26 RX ADMIN — HYDROCODONE BITARTRATE AND ACETAMINOPHEN 1 TABLET: 10; 325 TABLET ORAL at 20:46

## 2019-01-26 RX ADMIN — SOTALOL HYDROCHLORIDE 160 MG: 80 TABLET ORAL at 09:00

## 2019-01-26 RX ADMIN — SOTALOL HYDROCHLORIDE 160 MG: 80 TABLET ORAL at 20:46

## 2019-01-26 RX ADMIN — TIZANIDINE 4 MG: 4 TABLET ORAL at 14:27

## 2019-01-26 RX ADMIN — Medication 2 G: at 13:38

## 2019-01-26 RX ADMIN — IPRATROPIUM BROMIDE AND ALBUTEROL SULFATE 1 AMPULE: .5; 3 SOLUTION RESPIRATORY (INHALATION) at 06:51

## 2019-01-26 RX ADMIN — TIZANIDINE 4 MG: 4 TABLET ORAL at 20:48

## 2019-01-26 RX ADMIN — LORAZEPAM 0.5 MG: 0.5 TABLET ORAL at 20:46

## 2019-01-26 ASSESSMENT — PAIN SCALES - GENERAL
PAINLEVEL_OUTOF10: 6
PAINLEVEL_OUTOF10: 0
PAINLEVEL_OUTOF10: 8
PAINLEVEL_OUTOF10: 0
PAINLEVEL_OUTOF10: 9
PAINLEVEL_OUTOF10: 0

## 2019-01-27 PROBLEM — E83.42 HYPOMAGNESEMIA: Status: ACTIVE | Noted: 2019-01-27

## 2019-01-27 LAB
CULTURE, RESPIRATORY: NORMAL
GRAM STAIN RESULT: NORMAL
INR BLD: 4.26 (ref 0.88–1.18)
MAGNESIUM: 1.8 MG/DL (ref 1.6–2.6)
PROTHROMBIN TIME: 40.2 SEC (ref 12–14.6)
VANCOMYCIN TROUGH: 22.1 UG/ML (ref 10–20)

## 2019-01-27 PROCEDURE — 2580000003 HC RX 258: Performed by: HOSPITALIST

## 2019-01-27 PROCEDURE — 94640 AIRWAY INHALATION TREATMENT: CPT

## 2019-01-27 PROCEDURE — 6370000000 HC RX 637 (ALT 250 FOR IP): Performed by: INTERNAL MEDICINE

## 2019-01-27 PROCEDURE — 2700000000 HC OXYGEN THERAPY PER DAY

## 2019-01-27 PROCEDURE — 6370000000 HC RX 637 (ALT 250 FOR IP): Performed by: HOSPITALIST

## 2019-01-27 PROCEDURE — 83735 ASSAY OF MAGNESIUM: CPT

## 2019-01-27 PROCEDURE — 6360000002 HC RX W HCPCS: Performed by: HOSPITALIST

## 2019-01-27 PROCEDURE — 80202 ASSAY OF VANCOMYCIN: CPT

## 2019-01-27 PROCEDURE — 1210000000 HC MED SURG R&B

## 2019-01-27 PROCEDURE — 99232 SBSQ HOSP IP/OBS MODERATE 35: CPT | Performed by: INTERNAL MEDICINE

## 2019-01-27 PROCEDURE — 85610 PROTHROMBIN TIME: CPT

## 2019-01-27 RX ORDER — MAGNESIUM SULFATE IN WATER 40 MG/ML
2 INJECTION, SOLUTION INTRAVENOUS ONCE
Status: DISCONTINUED | OUTPATIENT
Start: 2019-01-27 | End: 2019-02-02 | Stop reason: HOSPADM

## 2019-01-27 RX ADMIN — SOTALOL HYDROCHLORIDE 160 MG: 80 TABLET ORAL at 20:13

## 2019-01-27 RX ADMIN — LORAZEPAM 0.5 MG: 0.5 TABLET ORAL at 20:13

## 2019-01-27 RX ADMIN — Medication 2 G: at 05:15

## 2019-01-27 RX ADMIN — LORAZEPAM 0.5 MG: 0.5 TABLET ORAL at 14:31

## 2019-01-27 RX ADMIN — LORAZEPAM 0.5 MG: 0.5 TABLET ORAL at 08:49

## 2019-01-27 RX ADMIN — VANCOMYCIN HYDROCHLORIDE 1750 MG: 10 INJECTION, POWDER, LYOPHILIZED, FOR SOLUTION INTRAVENOUS at 08:49

## 2019-01-27 RX ADMIN — HYDROCODONE BITARTRATE AND ACETAMINOPHEN 1 TABLET: 10; 325 TABLET ORAL at 17:50

## 2019-01-27 RX ADMIN — IPRATROPIUM BROMIDE AND ALBUTEROL SULFATE 1 AMPULE: .5; 3 SOLUTION RESPIRATORY (INHALATION) at 14:50

## 2019-01-27 RX ADMIN — IPRATROPIUM BROMIDE AND ALBUTEROL SULFATE 1 AMPULE: .5; 3 SOLUTION RESPIRATORY (INHALATION) at 06:48

## 2019-01-27 RX ADMIN — Medication 10 ML: at 20:14

## 2019-01-27 RX ADMIN — IPRATROPIUM BROMIDE AND ALBUTEROL SULFATE 1 AMPULE: .5; 3 SOLUTION RESPIRATORY (INHALATION) at 10:32

## 2019-01-27 RX ADMIN — HYDROCODONE BITARTRATE AND ACETAMINOPHEN 1 TABLET: 10; 325 TABLET ORAL at 11:26

## 2019-01-27 RX ADMIN — SOTALOL HYDROCHLORIDE 160 MG: 80 TABLET ORAL at 08:49

## 2019-01-27 RX ADMIN — TIZANIDINE 4 MG: 4 TABLET ORAL at 14:31

## 2019-01-27 RX ADMIN — Medication 2 G: at 14:31

## 2019-01-27 RX ADMIN — Medication 2 G: at 20:13

## 2019-01-27 RX ADMIN — HYDROCODONE BITARTRATE AND ACETAMINOPHEN 1 TABLET: 10; 325 TABLET ORAL at 05:15

## 2019-01-27 RX ADMIN — Medication 10 ML: at 08:49

## 2019-01-27 RX ADMIN — IPRATROPIUM BROMIDE AND ALBUTEROL SULFATE 1 AMPULE: .5; 3 SOLUTION RESPIRATORY (INHALATION) at 18:57

## 2019-01-27 RX ADMIN — TIZANIDINE 4 MG: 4 TABLET ORAL at 20:13

## 2019-01-27 RX ADMIN — TIZANIDINE 4 MG: 4 TABLET ORAL at 05:15

## 2019-01-27 ASSESSMENT — PAIN SCALES - GENERAL
PAINLEVEL_OUTOF10: 8
PAINLEVEL_OUTOF10: 7
PAINLEVEL_OUTOF10: 4
PAINLEVEL_OUTOF10: 8

## 2019-01-28 ENCOUNTER — APPOINTMENT (OUTPATIENT)
Dept: GENERAL RADIOLOGY | Age: 59
DRG: 180 | End: 2019-01-28
Payer: MEDICARE

## 2019-01-28 LAB
INR BLD: 2.35 (ref 0.88–1.18)
MAGNESIUM: 1.7 MG/DL (ref 1.6–2.6)
PROTHROMBIN TIME: 25 SEC (ref 12–14.6)
VANCOMYCIN TROUGH: 9.5 UG/ML (ref 10–20)

## 2019-01-28 PROCEDURE — 0W9B00Z DRAINAGE OF LEFT PLEURAL CAVITY WITH DRAINAGE DEVICE, OPEN APPROACH: ICD-10-PCS | Performed by: THORACIC SURGERY (CARDIOTHORACIC VASCULAR SURGERY)

## 2019-01-28 PROCEDURE — 1210000000 HC MED SURG R&B

## 2019-01-28 PROCEDURE — 94762 N-INVAS EAR/PLS OXIMTRY CONT: CPT

## 2019-01-28 PROCEDURE — 80202 ASSAY OF VANCOMYCIN: CPT

## 2019-01-28 PROCEDURE — 6370000000 HC RX 637 (ALT 250 FOR IP): Performed by: HOSPITALIST

## 2019-01-28 PROCEDURE — 99232 SBSQ HOSP IP/OBS MODERATE 35: CPT | Performed by: INTERNAL MEDICINE

## 2019-01-28 PROCEDURE — 2580000003 HC RX 258: Performed by: HOSPITALIST

## 2019-01-28 PROCEDURE — 2700000000 HC OXYGEN THERAPY PER DAY

## 2019-01-28 PROCEDURE — 6370000000 HC RX 637 (ALT 250 FOR IP): Performed by: INTERNAL MEDICINE

## 2019-01-28 PROCEDURE — 94664 DEMO&/EVAL PT USE INHALER: CPT

## 2019-01-28 PROCEDURE — 83735 ASSAY OF MAGNESIUM: CPT

## 2019-01-28 PROCEDURE — 71045 X-RAY EXAM CHEST 1 VIEW: CPT

## 2019-01-28 PROCEDURE — 6360000002 HC RX W HCPCS: Performed by: HOSPITALIST

## 2019-01-28 PROCEDURE — 94640 AIRWAY INHALATION TREATMENT: CPT

## 2019-01-28 PROCEDURE — 32551 INSERTION OF CHEST TUBE: CPT | Performed by: THORACIC SURGERY (CARDIOTHORACIC VASCULAR SURGERY)

## 2019-01-28 PROCEDURE — 85610 PROTHROMBIN TIME: CPT

## 2019-01-28 PROCEDURE — 6360000002 HC RX W HCPCS: Performed by: NURSE PRACTITIONER

## 2019-01-28 RX ORDER — LIDOCAINE HYDROCHLORIDE 20 MG/ML
20 INJECTION, SOLUTION INFILTRATION; PERINEURAL ONCE
Status: DISCONTINUED | OUTPATIENT
Start: 2019-01-28 | End: 2019-02-02 | Stop reason: HOSPADM

## 2019-01-28 RX ORDER — KETOROLAC TROMETHAMINE 30 MG/ML
30 INJECTION, SOLUTION INTRAMUSCULAR; INTRAVENOUS EVERY 8 HOURS
Status: DISCONTINUED | OUTPATIENT
Start: 2019-01-28 | End: 2019-01-31

## 2019-01-28 RX ORDER — PROMETHAZINE HYDROCHLORIDE 25 MG/1
25 TABLET ORAL EVERY 6 HOURS PRN
Status: DISCONTINUED | OUTPATIENT
Start: 2019-01-28 | End: 2019-02-02 | Stop reason: HOSPADM

## 2019-01-28 RX ORDER — ONDANSETRON 4 MG/1
4 TABLET, FILM COATED ORAL EVERY 8 HOURS PRN
Status: DISCONTINUED | OUTPATIENT
Start: 2019-01-28 | End: 2019-02-02 | Stop reason: HOSPADM

## 2019-01-28 RX ORDER — MORPHINE SULFATE/0.9% NACL/PF 1 MG/ML
4 SYRINGE (ML) INJECTION
Status: DISCONTINUED | OUTPATIENT
Start: 2019-01-28 | End: 2019-02-02 | Stop reason: HOSPADM

## 2019-01-28 RX ADMIN — VANCOMYCIN HYDROCHLORIDE 1500 MG: 10 INJECTION, POWDER, LYOPHILIZED, FOR SOLUTION INTRAVENOUS at 14:18

## 2019-01-28 RX ADMIN — Medication 2 G: at 14:18

## 2019-01-28 RX ADMIN — HYDROCODONE BITARTRATE AND ACETAMINOPHEN 1 TABLET: 10; 325 TABLET ORAL at 00:32

## 2019-01-28 RX ADMIN — HYDROCODONE BITARTRATE AND ACETAMINOPHEN 1 TABLET: 10; 325 TABLET ORAL at 19:59

## 2019-01-28 RX ADMIN — HYDROCODONE BITARTRATE AND ACETAMINOPHEN 1 TABLET: 10; 325 TABLET ORAL at 14:13

## 2019-01-28 RX ADMIN — ONDANSETRON HYDROCHLORIDE 4 MG: 4 TABLET, FILM COATED ORAL at 14:13

## 2019-01-28 RX ADMIN — IPRATROPIUM BROMIDE AND ALBUTEROL SULFATE 1 AMPULE: .5; 3 SOLUTION RESPIRATORY (INHALATION) at 06:55

## 2019-01-28 RX ADMIN — VANCOMYCIN HYDROCHLORIDE 1500 MG: 10 INJECTION, POWDER, LYOPHILIZED, FOR SOLUTION INTRAVENOUS at 03:12

## 2019-01-28 RX ADMIN — LORAZEPAM 0.5 MG: 0.5 TABLET ORAL at 07:36

## 2019-01-28 RX ADMIN — Medication 4 MG: at 17:10

## 2019-01-28 RX ADMIN — Medication 2 G: at 21:00

## 2019-01-28 RX ADMIN — Medication 2 G: at 05:32

## 2019-01-28 RX ADMIN — LORAZEPAM 0.5 MG: 0.5 TABLET ORAL at 19:59

## 2019-01-28 RX ADMIN — SOTALOL HYDROCHLORIDE 160 MG: 80 TABLET ORAL at 07:37

## 2019-01-28 RX ADMIN — Medication 4 MG: at 10:15

## 2019-01-28 RX ADMIN — Medication 10 ML: at 07:37

## 2019-01-28 RX ADMIN — Medication 4 MG: at 22:42

## 2019-01-28 RX ADMIN — IPRATROPIUM BROMIDE AND ALBUTEROL SULFATE 1 AMPULE: .5; 3 SOLUTION RESPIRATORY (INHALATION) at 18:11

## 2019-01-28 RX ADMIN — KETOROLAC TROMETHAMINE 30 MG: 30 INJECTION, SOLUTION INTRAMUSCULAR at 12:10

## 2019-01-28 RX ADMIN — IPRATROPIUM BROMIDE AND ALBUTEROL SULFATE 1 AMPULE: .5; 3 SOLUTION RESPIRATORY (INHALATION) at 14:47

## 2019-01-28 RX ADMIN — Medication 4 MG: at 20:36

## 2019-01-28 RX ADMIN — KETOROLAC TROMETHAMINE 30 MG: 30 INJECTION, SOLUTION INTRAMUSCULAR at 18:32

## 2019-01-28 RX ADMIN — SOTALOL HYDROCHLORIDE 160 MG: 80 TABLET ORAL at 19:59

## 2019-01-28 RX ADMIN — TIZANIDINE 4 MG: 4 TABLET ORAL at 19:59

## 2019-01-28 RX ADMIN — HYDROCODONE BITARTRATE AND ACETAMINOPHEN 1 TABLET: 10; 325 TABLET ORAL at 07:36

## 2019-01-28 RX ADMIN — TIZANIDINE 4 MG: 4 TABLET ORAL at 08:19

## 2019-01-28 RX ADMIN — LORAZEPAM 0.5 MG: 0.5 TABLET ORAL at 14:13

## 2019-01-28 ASSESSMENT — PAIN SCALES - GENERAL
PAINLEVEL_OUTOF10: 8
PAINLEVEL_OUTOF10: 8
PAINLEVEL_OUTOF10: 10
PAINLEVEL_OUTOF10: 9
PAINLEVEL_OUTOF10: 9
PAINLEVEL_OUTOF10: 8
PAINLEVEL_OUTOF10: 9
PAINLEVEL_OUTOF10: 10
PAINLEVEL_OUTOF10: 10
PAINLEVEL_OUTOF10: 8

## 2019-01-29 ENCOUNTER — APPOINTMENT (OUTPATIENT)
Dept: GENERAL RADIOLOGY | Age: 59
DRG: 180 | End: 2019-01-29
Payer: MEDICARE

## 2019-01-29 LAB
ANION GAP SERPL CALCULATED.3IONS-SCNC: 7 MMOL/L (ref 7–19)
BUN BLDV-MCNC: 13 MG/DL (ref 6–20)
CALCIUM SERPL-MCNC: 8.8 MG/DL (ref 8.6–10)
CHLORIDE BLD-SCNC: 104 MMOL/L (ref 98–111)
CO2: 27 MMOL/L (ref 22–29)
CREAT SERPL-MCNC: 0.5 MG/DL (ref 0.5–0.9)
EKG P AXIS: 60 DEGREES
EKG P-R INTERVAL: 142 MS
EKG Q-T INTERVAL: 388 MS
EKG QRS DURATION: 84 MS
EKG QTC CALCULATION (BAZETT): 410 MS
EKG T AXIS: 13 DEGREES
GFR NON-AFRICAN AMERICAN: >60
GLUCOSE BLD-MCNC: 101 MG/DL (ref 74–109)
INR BLD: 1.62 (ref 0.88–1.18)
MAGNESIUM: 1.7 MG/DL (ref 1.6–2.6)
POTASSIUM SERPL-SCNC: 4.1 MMOL/L (ref 3.5–5)
PROTHROMBIN TIME: 18.5 SEC (ref 12–14.6)
SODIUM BLD-SCNC: 138 MMOL/L (ref 136–145)
VANCOMYCIN TROUGH: 11.3 UG/ML (ref 10–20)

## 2019-01-29 PROCEDURE — 6360000002 HC RX W HCPCS: Performed by: NURSE PRACTITIONER

## 2019-01-29 PROCEDURE — 71045 X-RAY EXAM CHEST 1 VIEW: CPT

## 2019-01-29 PROCEDURE — 2700000000 HC OXYGEN THERAPY PER DAY

## 2019-01-29 PROCEDURE — 80202 ASSAY OF VANCOMYCIN: CPT

## 2019-01-29 PROCEDURE — 83735 ASSAY OF MAGNESIUM: CPT

## 2019-01-29 PROCEDURE — 85610 PROTHROMBIN TIME: CPT

## 2019-01-29 PROCEDURE — 6370000000 HC RX 637 (ALT 250 FOR IP): Performed by: HOSPITALIST

## 2019-01-29 PROCEDURE — 6360000002 HC RX W HCPCS: Performed by: HOSPITALIST

## 2019-01-29 PROCEDURE — 2580000003 HC RX 258: Performed by: HOSPITALIST

## 2019-01-29 PROCEDURE — 1210000000 HC MED SURG R&B

## 2019-01-29 PROCEDURE — 6370000000 HC RX 637 (ALT 250 FOR IP): Performed by: INTERNAL MEDICINE

## 2019-01-29 PROCEDURE — 92526 ORAL FUNCTION THERAPY: CPT

## 2019-01-29 PROCEDURE — 94640 AIRWAY INHALATION TREATMENT: CPT

## 2019-01-29 PROCEDURE — 80048 BASIC METABOLIC PNL TOTAL CA: CPT

## 2019-01-29 PROCEDURE — 99232 SBSQ HOSP IP/OBS MODERATE 35: CPT | Performed by: INTERNAL MEDICINE

## 2019-01-29 PROCEDURE — 94762 N-INVAS EAR/PLS OXIMTRY CONT: CPT

## 2019-01-29 RX ORDER — WARFARIN SODIUM 2.5 MG/1
2.5 TABLET ORAL
Status: COMPLETED | OUTPATIENT
Start: 2019-01-29 | End: 2019-01-29

## 2019-01-29 RX ADMIN — KETOROLAC TROMETHAMINE 30 MG: 30 INJECTION, SOLUTION INTRAMUSCULAR at 03:34

## 2019-01-29 RX ADMIN — Medication 4 MG: at 04:57

## 2019-01-29 RX ADMIN — ONDANSETRON HYDROCHLORIDE 4 MG: 4 TABLET, FILM COATED ORAL at 14:49

## 2019-01-29 RX ADMIN — Medication 4 MG: at 02:33

## 2019-01-29 RX ADMIN — TIZANIDINE 4 MG: 4 TABLET ORAL at 20:34

## 2019-01-29 RX ADMIN — SOTALOL HYDROCHLORIDE 160 MG: 80 TABLET ORAL at 09:04

## 2019-01-29 RX ADMIN — LORAZEPAM 0.5 MG: 0.5 TABLET ORAL at 20:33

## 2019-01-29 RX ADMIN — Medication 2 G: at 14:49

## 2019-01-29 RX ADMIN — WARFARIN SODIUM 2.5 MG: 2.5 TABLET ORAL at 18:58

## 2019-01-29 RX ADMIN — IPRATROPIUM BROMIDE AND ALBUTEROL SULFATE 1 AMPULE: .5; 3 SOLUTION RESPIRATORY (INHALATION) at 07:11

## 2019-01-29 RX ADMIN — TIZANIDINE 4 MG: 4 TABLET ORAL at 09:08

## 2019-01-29 RX ADMIN — HYDROCODONE BITARTRATE AND ACETAMINOPHEN 1 TABLET: 10; 325 TABLET ORAL at 17:21

## 2019-01-29 RX ADMIN — KETOROLAC TROMETHAMINE 30 MG: 30 INJECTION, SOLUTION INTRAMUSCULAR at 09:04

## 2019-01-29 RX ADMIN — Medication 2 G: at 20:33

## 2019-01-29 RX ADMIN — Medication 4 MG: at 12:03

## 2019-01-29 RX ADMIN — IPRATROPIUM BROMIDE AND ALBUTEROL SULFATE 1 AMPULE: .5; 3 SOLUTION RESPIRATORY (INHALATION) at 14:29

## 2019-01-29 RX ADMIN — Medication 4 MG: at 20:33

## 2019-01-29 RX ADMIN — LORAZEPAM 0.5 MG: 0.5 TABLET ORAL at 09:04

## 2019-01-29 RX ADMIN — IPRATROPIUM BROMIDE AND ALBUTEROL SULFATE 1 AMPULE: .5; 3 SOLUTION RESPIRATORY (INHALATION) at 18:46

## 2019-01-29 RX ADMIN — IPRATROPIUM BROMIDE AND ALBUTEROL SULFATE 1 AMPULE: .5; 3 SOLUTION RESPIRATORY (INHALATION) at 10:32

## 2019-01-29 RX ADMIN — Medication 4 MG: at 14:50

## 2019-01-29 RX ADMIN — SOTALOL HYDROCHLORIDE 160 MG: 80 TABLET ORAL at 20:34

## 2019-01-29 RX ADMIN — HYDROCODONE BITARTRATE AND ACETAMINOPHEN 1 TABLET: 10; 325 TABLET ORAL at 11:06

## 2019-01-29 RX ADMIN — VANCOMYCIN HYDROCHLORIDE 1500 MG: 10 INJECTION, POWDER, LYOPHILIZED, FOR SOLUTION INTRAVENOUS at 14:50

## 2019-01-29 RX ADMIN — VANCOMYCIN HYDROCHLORIDE 1500 MG: 10 INJECTION, POWDER, LYOPHILIZED, FOR SOLUTION INTRAVENOUS at 03:34

## 2019-01-29 RX ADMIN — KETOROLAC TROMETHAMINE 30 MG: 30 INJECTION, SOLUTION INTRAMUSCULAR at 18:58

## 2019-01-29 RX ADMIN — Medication 2 G: at 04:54

## 2019-01-29 RX ADMIN — Medication 10 ML: at 09:04

## 2019-01-29 RX ADMIN — LORAZEPAM 0.5 MG: 0.5 TABLET ORAL at 14:50

## 2019-01-29 ASSESSMENT — PAIN DESCRIPTION - LOCATION: LOCATION: RIB CAGE

## 2019-01-29 ASSESSMENT — PAIN SCALES - GENERAL
PAINLEVEL_OUTOF10: 5
PAINLEVEL_OUTOF10: 8
PAINLEVEL_OUTOF10: 8
PAINLEVEL_OUTOF10: 7
PAINLEVEL_OUTOF10: 8
PAINLEVEL_OUTOF10: 10
PAINLEVEL_OUTOF10: 8
PAINLEVEL_OUTOF10: 10
PAINLEVEL_OUTOF10: 8
PAINLEVEL_OUTOF10: 9
PAINLEVEL_OUTOF10: 8

## 2019-01-29 ASSESSMENT — PAIN DESCRIPTION - ORIENTATION: ORIENTATION: LEFT

## 2019-01-29 ASSESSMENT — PAIN DESCRIPTION - PAIN TYPE: TYPE: ACUTE PAIN

## 2019-01-30 ENCOUNTER — APPOINTMENT (OUTPATIENT)
Dept: GENERAL RADIOLOGY | Age: 59
DRG: 180 | End: 2019-01-30
Payer: MEDICARE

## 2019-01-30 LAB
ANION GAP SERPL CALCULATED.3IONS-SCNC: 8 MMOL/L (ref 7–19)
BLOOD CULTURE, ROUTINE: NORMAL
BUN BLDV-MCNC: 11 MG/DL (ref 6–20)
CALCIUM SERPL-MCNC: 8.7 MG/DL (ref 8.6–10)
CHLORIDE BLD-SCNC: 102 MMOL/L (ref 98–111)
CO2: 27 MMOL/L (ref 22–29)
CREAT SERPL-MCNC: 0.6 MG/DL (ref 0.5–0.9)
CULTURE, BLOOD 2: NORMAL
GFR NON-AFRICAN AMERICAN: >60
GLUCOSE BLD-MCNC: 114 MG/DL (ref 74–109)
INR BLD: 1.5 (ref 0.88–1.18)
MAGNESIUM: 1.6 MG/DL (ref 1.6–2.6)
POTASSIUM SERPL-SCNC: 4.3 MMOL/L (ref 3.5–5)
PROTHROMBIN TIME: 17.4 SEC (ref 12–14.6)
SODIUM BLD-SCNC: 137 MMOL/L (ref 136–145)

## 2019-01-30 PROCEDURE — 71045 X-RAY EXAM CHEST 1 VIEW: CPT

## 2019-01-30 PROCEDURE — 94640 AIRWAY INHALATION TREATMENT: CPT

## 2019-01-30 PROCEDURE — 6370000000 HC RX 637 (ALT 250 FOR IP): Performed by: HOSPITALIST

## 2019-01-30 PROCEDURE — 1210000000 HC MED SURG R&B

## 2019-01-30 PROCEDURE — 6360000002 HC RX W HCPCS: Performed by: INTERNAL MEDICINE

## 2019-01-30 PROCEDURE — 80048 BASIC METABOLIC PNL TOTAL CA: CPT

## 2019-01-30 PROCEDURE — 85610 PROTHROMBIN TIME: CPT

## 2019-01-30 PROCEDURE — 2700000000 HC OXYGEN THERAPY PER DAY

## 2019-01-30 PROCEDURE — 6360000002 HC RX W HCPCS: Performed by: NURSE PRACTITIONER

## 2019-01-30 PROCEDURE — 99232 SBSQ HOSP IP/OBS MODERATE 35: CPT | Performed by: INTERNAL MEDICINE

## 2019-01-30 PROCEDURE — 6370000000 HC RX 637 (ALT 250 FOR IP): Performed by: INTERNAL MEDICINE

## 2019-01-30 PROCEDURE — 6360000002 HC RX W HCPCS: Performed by: HOSPITALIST

## 2019-01-30 PROCEDURE — 2580000003 HC RX 258: Performed by: HOSPITALIST

## 2019-01-30 PROCEDURE — 83735 ASSAY OF MAGNESIUM: CPT

## 2019-01-30 RX ADMIN — SOTALOL HYDROCHLORIDE 160 MG: 80 TABLET ORAL at 10:28

## 2019-01-30 RX ADMIN — LORAZEPAM 0.5 MG: 0.5 TABLET ORAL at 15:23

## 2019-01-30 RX ADMIN — Medication 4 MG: at 02:31

## 2019-01-30 RX ADMIN — Medication 2 G: at 20:57

## 2019-01-30 RX ADMIN — IPRATROPIUM BROMIDE AND ALBUTEROL SULFATE 1 AMPULE: .5; 3 SOLUTION RESPIRATORY (INHALATION) at 18:54

## 2019-01-30 RX ADMIN — PROMETHAZINE HYDROCHLORIDE 25 MG: 25 TABLET ORAL at 02:31

## 2019-01-30 RX ADMIN — Medication 10 ML: at 20:59

## 2019-01-30 RX ADMIN — IPRATROPIUM BROMIDE AND ALBUTEROL SULFATE 1 AMPULE: .5; 3 SOLUTION RESPIRATORY (INHALATION) at 06:29

## 2019-01-30 RX ADMIN — VANCOMYCIN HYDROCHLORIDE 1500 MG: 10 INJECTION, POWDER, LYOPHILIZED, FOR SOLUTION INTRAVENOUS at 16:18

## 2019-01-30 RX ADMIN — KETOROLAC TROMETHAMINE 30 MG: 30 INJECTION, SOLUTION INTRAMUSCULAR at 10:28

## 2019-01-30 RX ADMIN — ENOXAPARIN SODIUM 120 MG: 150 INJECTION SUBCUTANEOUS at 20:58

## 2019-01-30 RX ADMIN — SOTALOL HYDROCHLORIDE 160 MG: 80 TABLET ORAL at 20:58

## 2019-01-30 RX ADMIN — VANCOMYCIN HYDROCHLORIDE 1500 MG: 10 INJECTION, POWDER, LYOPHILIZED, FOR SOLUTION INTRAVENOUS at 03:18

## 2019-01-30 RX ADMIN — Medication 4 MG: at 17:50

## 2019-01-30 RX ADMIN — Medication 2 G: at 05:10

## 2019-01-30 RX ADMIN — LORAZEPAM 0.5 MG: 0.5 TABLET ORAL at 10:28

## 2019-01-30 RX ADMIN — Medication 2 G: at 15:23

## 2019-01-30 RX ADMIN — Medication 10 ML: at 10:29

## 2019-01-30 RX ADMIN — IPRATROPIUM BROMIDE AND ALBUTEROL SULFATE 1 AMPULE: .5; 3 SOLUTION RESPIRATORY (INHALATION) at 10:25

## 2019-01-30 RX ADMIN — TIZANIDINE 4 MG: 4 TABLET ORAL at 17:50

## 2019-01-30 RX ADMIN — LORAZEPAM 0.5 MG: 0.5 TABLET ORAL at 20:58

## 2019-01-30 RX ADMIN — HYDROCODONE BITARTRATE AND ACETAMINOPHEN 1 TABLET: 10; 325 TABLET ORAL at 19:40

## 2019-01-30 RX ADMIN — IPRATROPIUM BROMIDE AND ALBUTEROL SULFATE 1 AMPULE: .5; 3 SOLUTION RESPIRATORY (INHALATION) at 14:23

## 2019-01-30 RX ADMIN — Medication 4 MG: at 15:24

## 2019-01-30 RX ADMIN — TIZANIDINE 4 MG: 4 TABLET ORAL at 05:12

## 2019-01-30 RX ADMIN — Medication 4 MG: at 10:28

## 2019-01-30 RX ADMIN — HYDROCODONE BITARTRATE AND ACETAMINOPHEN 1 TABLET: 10; 325 TABLET ORAL at 13:28

## 2019-01-30 RX ADMIN — ENOXAPARIN SODIUM 120 MG: 150 INJECTION SUBCUTANEOUS at 15:24

## 2019-01-30 RX ADMIN — Medication 4 MG: at 20:57

## 2019-01-30 RX ADMIN — PROMETHAZINE HYDROCHLORIDE 25 MG: 25 TABLET ORAL at 15:47

## 2019-01-30 RX ADMIN — HYDROCODONE BITARTRATE AND ACETAMINOPHEN 1 TABLET: 10; 325 TABLET ORAL at 05:12

## 2019-01-30 RX ADMIN — KETOROLAC TROMETHAMINE 30 MG: 30 INJECTION, SOLUTION INTRAMUSCULAR at 03:18

## 2019-01-30 ASSESSMENT — PAIN SCALES - GENERAL
PAINLEVEL_OUTOF10: 10
PAINLEVEL_OUTOF10: 10
PAINLEVEL_OUTOF10: 5
PAINLEVEL_OUTOF10: 8
PAINLEVEL_OUTOF10: 7
PAINLEVEL_OUTOF10: 8
PAINLEVEL_OUTOF10: 5
PAINLEVEL_OUTOF10: 6
PAINLEVEL_OUTOF10: 7
PAINLEVEL_OUTOF10: 7

## 2019-01-30 ASSESSMENT — PAIN DESCRIPTION - ORIENTATION: ORIENTATION: LEFT

## 2019-01-30 ASSESSMENT — PAIN DESCRIPTION - PAIN TYPE: TYPE: ACUTE PAIN

## 2019-01-30 ASSESSMENT — PAIN DESCRIPTION - LOCATION: LOCATION: RIB CAGE;SHOULDER

## 2019-01-31 ENCOUNTER — APPOINTMENT (OUTPATIENT)
Dept: GENERAL RADIOLOGY | Age: 59
DRG: 180 | End: 2019-01-31
Payer: MEDICARE

## 2019-01-31 PROBLEM — Z51.5 PALLIATIVE CARE PATIENT: Status: ACTIVE | Noted: 2019-01-31

## 2019-01-31 LAB
ANION GAP SERPL CALCULATED.3IONS-SCNC: 11 MMOL/L (ref 7–19)
BUN BLDV-MCNC: 15 MG/DL (ref 6–20)
CALCIUM SERPL-MCNC: 8.9 MG/DL (ref 8.6–10)
CHLORIDE BLD-SCNC: 105 MMOL/L (ref 98–111)
CO2: 23 MMOL/L (ref 22–29)
CREAT SERPL-MCNC: 0.9 MG/DL (ref 0.5–0.9)
GFR NON-AFRICAN AMERICAN: >60
GLUCOSE BLD-MCNC: 96 MG/DL (ref 74–109)
INR BLD: 3.18 (ref 0.88–1.18)
MAGNESIUM: 1.5 MG/DL (ref 1.6–2.6)
POTASSIUM SERPL-SCNC: 4 MMOL/L (ref 3.5–5)
PROTHROMBIN TIME: 31.8 SEC (ref 12–14.6)
SODIUM BLD-SCNC: 139 MMOL/L (ref 136–145)

## 2019-01-31 PROCEDURE — 6370000000 HC RX 637 (ALT 250 FOR IP): Performed by: HOSPITALIST

## 2019-01-31 PROCEDURE — 71045 X-RAY EXAM CHEST 1 VIEW: CPT

## 2019-01-31 PROCEDURE — 1210000000 HC MED SURG R&B

## 2019-01-31 PROCEDURE — 6360000002 HC RX W HCPCS: Performed by: NURSE PRACTITIONER

## 2019-01-31 PROCEDURE — 6370000000 HC RX 637 (ALT 250 FOR IP): Performed by: INTERNAL MEDICINE

## 2019-01-31 PROCEDURE — 94640 AIRWAY INHALATION TREATMENT: CPT

## 2019-01-31 PROCEDURE — 83735 ASSAY OF MAGNESIUM: CPT

## 2019-01-31 PROCEDURE — 85610 PROTHROMBIN TIME: CPT

## 2019-01-31 PROCEDURE — 2700000000 HC OXYGEN THERAPY PER DAY

## 2019-01-31 PROCEDURE — 80048 BASIC METABOLIC PNL TOTAL CA: CPT

## 2019-01-31 PROCEDURE — 2580000003 HC RX 258: Performed by: HOSPITALIST

## 2019-01-31 PROCEDURE — 99232 SBSQ HOSP IP/OBS MODERATE 35: CPT | Performed by: INTERNAL MEDICINE

## 2019-01-31 PROCEDURE — 6360000002 HC RX W HCPCS: Performed by: HOSPITALIST

## 2019-01-31 RX ORDER — METHYLPREDNISOLONE SODIUM SUCCINATE 40 MG/ML
40 INJECTION, POWDER, LYOPHILIZED, FOR SOLUTION INTRAMUSCULAR; INTRAVENOUS DAILY
Status: DISCONTINUED | OUTPATIENT
Start: 2019-01-31 | End: 2019-02-02 | Stop reason: HOSPADM

## 2019-01-31 RX ORDER — TIZANIDINE 2 MG/1
2 TABLET ORAL EVERY 6 HOURS PRN
Status: DISCONTINUED | OUTPATIENT
Start: 2019-01-31 | End: 2019-02-02 | Stop reason: HOSPADM

## 2019-01-31 RX ORDER — MAGNESIUM SULFATE IN WATER 40 MG/ML
2 INJECTION, SOLUTION INTRAVENOUS ONCE
Status: DISCONTINUED | OUTPATIENT
Start: 2019-01-31 | End: 2019-02-02 | Stop reason: HOSPADM

## 2019-01-31 RX ORDER — MAGNESIUM SULFATE IN WATER 40 MG/ML
2 INJECTION, SOLUTION INTRAVENOUS ONCE
Status: COMPLETED | OUTPATIENT
Start: 2019-01-31 | End: 2019-01-31

## 2019-01-31 RX ADMIN — Medication 4 MG: at 10:54

## 2019-01-31 RX ADMIN — ENOXAPARIN SODIUM 120 MG: 150 INJECTION SUBCUTANEOUS at 08:56

## 2019-01-31 RX ADMIN — IPRATROPIUM BROMIDE AND ALBUTEROL SULFATE 1 AMPULE: .5; 3 SOLUTION RESPIRATORY (INHALATION) at 14:30

## 2019-01-31 RX ADMIN — Medication 2 G: at 14:06

## 2019-01-31 RX ADMIN — PROMETHAZINE HYDROCHLORIDE 25 MG: 25 TABLET ORAL at 14:54

## 2019-01-31 RX ADMIN — Medication 4 MG: at 14:06

## 2019-01-31 RX ADMIN — KETOROLAC TROMETHAMINE 30 MG: 30 INJECTION, SOLUTION INTRAMUSCULAR at 12:51

## 2019-01-31 RX ADMIN — Medication 2 G: at 21:49

## 2019-01-31 RX ADMIN — SOTALOL HYDROCHLORIDE 160 MG: 80 TABLET ORAL at 21:50

## 2019-01-31 RX ADMIN — Medication 4 MG: at 18:45

## 2019-01-31 RX ADMIN — KETOROLAC TROMETHAMINE 30 MG: 30 INJECTION, SOLUTION INTRAMUSCULAR at 06:50

## 2019-01-31 RX ADMIN — TIZANIDINE 2 MG: 2 TABLET ORAL at 21:50

## 2019-01-31 RX ADMIN — IPRATROPIUM BROMIDE AND ALBUTEROL SULFATE 1 AMPULE: .5; 3 SOLUTION RESPIRATORY (INHALATION) at 06:23

## 2019-01-31 RX ADMIN — Medication 4 MG: at 21:49

## 2019-01-31 RX ADMIN — Medication 2 G: at 04:47

## 2019-01-31 RX ADMIN — LORAZEPAM 0.5 MG: 0.5 TABLET ORAL at 08:57

## 2019-01-31 RX ADMIN — SOTALOL HYDROCHLORIDE 160 MG: 80 TABLET ORAL at 08:57

## 2019-01-31 RX ADMIN — IPRATROPIUM BROMIDE AND ALBUTEROL SULFATE 1 AMPULE: .5; 3 SOLUTION RESPIRATORY (INHALATION) at 10:20

## 2019-01-31 RX ADMIN — LORAZEPAM 0.5 MG: 0.5 TABLET ORAL at 14:06

## 2019-01-31 RX ADMIN — LORAZEPAM 0.5 MG: 0.5 TABLET ORAL at 21:50

## 2019-01-31 RX ADMIN — Medication 4 MG: at 00:40

## 2019-01-31 RX ADMIN — HYDROCODONE BITARTRATE AND ACETAMINOPHEN 1 TABLET: 10; 325 TABLET ORAL at 16:22

## 2019-01-31 RX ADMIN — MAGNESIUM SULFATE HEPTAHYDRATE 2 G: 40 INJECTION, SOLUTION INTRAVENOUS at 08:57

## 2019-01-31 RX ADMIN — HYDROCODONE BITARTRATE AND ACETAMINOPHEN 1 TABLET: 10; 325 TABLET ORAL at 08:56

## 2019-01-31 RX ADMIN — VANCOMYCIN HYDROCHLORIDE 1500 MG: 10 INJECTION, POWDER, LYOPHILIZED, FOR SOLUTION INTRAVENOUS at 04:47

## 2019-01-31 RX ADMIN — PROMETHAZINE HYDROCHLORIDE 25 MG: 25 TABLET ORAL at 04:59

## 2019-01-31 RX ADMIN — HYDROCODONE BITARTRATE AND ACETAMINOPHEN 1 TABLET: 10; 325 TABLET ORAL at 03:15

## 2019-01-31 RX ADMIN — VANCOMYCIN HYDROCHLORIDE 1500 MG: 10 INJECTION, POWDER, LYOPHILIZED, FOR SOLUTION INTRAVENOUS at 15:53

## 2019-01-31 RX ADMIN — MAGNESIUM HYDROXIDE 30 ML: 400 SUSPENSION ORAL at 18:08

## 2019-01-31 RX ADMIN — Medication 4 MG: at 04:48

## 2019-01-31 RX ADMIN — IPRATROPIUM BROMIDE AND ALBUTEROL SULFATE 1 AMPULE: .5; 3 SOLUTION RESPIRATORY (INHALATION) at 20:30

## 2019-01-31 ASSESSMENT — PAIN SCALES - GENERAL
PAINLEVEL_OUTOF10: 5
PAINLEVEL_OUTOF10: 10
PAINLEVEL_OUTOF10: 6
PAINLEVEL_OUTOF10: 7
PAINLEVEL_OUTOF10: 7
PAINLEVEL_OUTOF10: 9
PAINLEVEL_OUTOF10: 9
PAINLEVEL_OUTOF10: 7
PAINLEVEL_OUTOF10: 9
PAINLEVEL_OUTOF10: 4
PAINLEVEL_OUTOF10: 2
PAINLEVEL_OUTOF10: 4
PAINLEVEL_OUTOF10: 9
PAINLEVEL_OUTOF10: 7
PAINLEVEL_OUTOF10: 7
PAINLEVEL_OUTOF10: 8

## 2019-02-01 ENCOUNTER — APPOINTMENT (OUTPATIENT)
Dept: GENERAL RADIOLOGY | Age: 59
DRG: 180 | End: 2019-02-01
Payer: MEDICARE

## 2019-02-01 PROBLEM — J96.01 ACUTE RESPIRATORY FAILURE WITH HYPOXIA (HCC): Status: ACTIVE | Noted: 2019-01-26

## 2019-02-01 LAB
ALBUMIN SERPL-MCNC: 2.9 G/DL (ref 3.5–5.2)
ALP BLD-CCNC: 106 U/L (ref 35–104)
ALT SERPL-CCNC: 40 U/L (ref 5–33)
ANION GAP SERPL CALCULATED.3IONS-SCNC: 9 MMOL/L (ref 7–19)
AST SERPL-CCNC: 45 U/L (ref 5–32)
BASOPHILS ABSOLUTE: 0.1 K/UL (ref 0–0.2)
BASOPHILS RELATIVE PERCENT: 0.6 % (ref 0–1)
BILIRUB SERPL-MCNC: <0.2 MG/DL (ref 0.2–1.2)
BUN BLDV-MCNC: 20 MG/DL (ref 6–20)
CALCIUM SERPL-MCNC: 9.1 MG/DL (ref 8.6–10)
CHLORIDE BLD-SCNC: 101 MMOL/L (ref 98–111)
CO2: 25 MMOL/L (ref 22–29)
CREAT SERPL-MCNC: 1.2 MG/DL (ref 0.5–0.9)
EOSINOPHILS ABSOLUTE: 0.3 K/UL (ref 0–0.6)
EOSINOPHILS RELATIVE PERCENT: 2.6 % (ref 0–5)
GFR NON-AFRICAN AMERICAN: 46
GLUCOSE BLD-MCNC: 104 MG/DL (ref 74–109)
HCT VFR BLD CALC: 34.8 % (ref 37–47)
HEMOGLOBIN: 11.3 G/DL (ref 12–16)
INR BLD: 1.26 (ref 0.88–1.18)
LACTATE DEHYDROGENASE: 321 U/L (ref 91–215)
LYMPHOCYTES ABSOLUTE: 0.8 K/UL (ref 1.1–4.5)
LYMPHOCYTES RELATIVE PERCENT: 7.7 % (ref 20–40)
MAGNESIUM: 2.2 MG/DL (ref 1.6–2.6)
MCH RBC QN AUTO: 29.1 PG (ref 27–31)
MCHC RBC AUTO-ENTMCNC: 32.5 G/DL (ref 33–37)
MCV RBC AUTO: 89.7 FL (ref 81–99)
MONOCYTES ABSOLUTE: 1.5 K/UL (ref 0–0.9)
MONOCYTES RELATIVE PERCENT: 14.2 % (ref 0–10)
NEUTROPHILS ABSOLUTE: 8.1 K/UL (ref 1.5–7.5)
NEUTROPHILS RELATIVE PERCENT: 74.5 % (ref 50–65)
PDW BLD-RTO: 13.6 % (ref 11.5–14.5)
PLATELET # BLD: 258 K/UL (ref 130–400)
PMV BLD AUTO: 11 FL (ref 9.4–12.3)
POTASSIUM SERPL-SCNC: 4.3 MMOL/L (ref 3.5–5)
PROTHROMBIN TIME: 15.2 SEC (ref 12–14.6)
RBC # BLD: 3.88 M/UL (ref 4.2–5.4)
SODIUM BLD-SCNC: 135 MMOL/L (ref 136–145)
TOTAL PROTEIN: 5.6 G/DL (ref 6.6–8.7)
WBC # BLD: 10.8 K/UL (ref 4.8–10.8)

## 2019-02-01 PROCEDURE — 83735 ASSAY OF MAGNESIUM: CPT

## 2019-02-01 PROCEDURE — 80053 COMPREHEN METABOLIC PANEL: CPT

## 2019-02-01 PROCEDURE — 71045 X-RAY EXAM CHEST 1 VIEW: CPT

## 2019-02-01 PROCEDURE — 6370000000 HC RX 637 (ALT 250 FOR IP): Performed by: INTERNAL MEDICINE

## 2019-02-01 PROCEDURE — 99232 SBSQ HOSP IP/OBS MODERATE 35: CPT | Performed by: INTERNAL MEDICINE

## 2019-02-01 PROCEDURE — 6370000000 HC RX 637 (ALT 250 FOR IP): Performed by: HOSPITALIST

## 2019-02-01 PROCEDURE — 2580000003 HC RX 258: Performed by: HOSPITALIST

## 2019-02-01 PROCEDURE — 85610 PROTHROMBIN TIME: CPT

## 2019-02-01 PROCEDURE — 6360000002 HC RX W HCPCS: Performed by: INTERNAL MEDICINE

## 2019-02-01 PROCEDURE — 6360000002 HC RX W HCPCS: Performed by: HOSPITALIST

## 2019-02-01 PROCEDURE — 6360000002 HC RX W HCPCS: Performed by: NURSE PRACTITIONER

## 2019-02-01 PROCEDURE — 83615 LACTATE (LD) (LDH) ENZYME: CPT

## 2019-02-01 PROCEDURE — 94761 N-INVAS EAR/PLS OXIMETRY MLT: CPT

## 2019-02-01 PROCEDURE — 1210000000 HC MED SURG R&B

## 2019-02-01 PROCEDURE — 85025 COMPLETE CBC W/AUTO DIFF WBC: CPT

## 2019-02-01 PROCEDURE — 99231 SBSQ HOSP IP/OBS SF/LOW 25: CPT | Performed by: THORACIC SURGERY (CARDIOTHORACIC VASCULAR SURGERY)

## 2019-02-01 PROCEDURE — 2700000000 HC OXYGEN THERAPY PER DAY

## 2019-02-01 PROCEDURE — 94640 AIRWAY INHALATION TREATMENT: CPT

## 2019-02-01 RX ORDER — SODIUM CHLORIDE 9 MG/ML
INJECTION, SOLUTION INTRAVENOUS CONTINUOUS
Status: CANCELLED | OUTPATIENT
Start: 2019-02-13

## 2019-02-01 RX ORDER — LIDOCAINE HYDROCHLORIDE 40 MG/ML
2.5 INJECTION, SOLUTION RETROBULBAR; TOPICAL ONCE
Status: CANCELLED | OUTPATIENT
Start: 2019-02-01 | End: 2019-02-01

## 2019-02-01 RX ADMIN — HYDROCODONE BITARTRATE AND ACETAMINOPHEN 1 TABLET: 10; 325 TABLET ORAL at 14:23

## 2019-02-01 RX ADMIN — SOTALOL HYDROCHLORIDE 160 MG: 80 TABLET ORAL at 07:54

## 2019-02-01 RX ADMIN — LORAZEPAM 0.5 MG: 0.5 TABLET ORAL at 21:10

## 2019-02-01 RX ADMIN — IPRATROPIUM BROMIDE AND ALBUTEROL SULFATE 1 AMPULE: .5; 3 SOLUTION RESPIRATORY (INHALATION) at 20:54

## 2019-02-01 RX ADMIN — LORAZEPAM 0.5 MG: 0.5 TABLET ORAL at 07:54

## 2019-02-01 RX ADMIN — Medication 4 MG: at 10:41

## 2019-02-01 RX ADMIN — Medication 2 G: at 07:53

## 2019-02-01 RX ADMIN — Medication 10 ML: at 07:55

## 2019-02-01 RX ADMIN — TIZANIDINE 2 MG: 2 TABLET ORAL at 21:10

## 2019-02-01 RX ADMIN — HYDROCODONE BITARTRATE AND ACETAMINOPHEN 1 TABLET: 10; 325 TABLET ORAL at 21:10

## 2019-02-01 RX ADMIN — Medication 10 ML: at 09:00

## 2019-02-01 RX ADMIN — IPRATROPIUM BROMIDE AND ALBUTEROL SULFATE 1 AMPULE: .5; 3 SOLUTION RESPIRATORY (INHALATION) at 06:57

## 2019-02-01 RX ADMIN — PROMETHAZINE HYDROCHLORIDE 25 MG: 25 TABLET ORAL at 04:22

## 2019-02-01 RX ADMIN — Medication 10 ML: at 21:11

## 2019-02-01 RX ADMIN — Medication 4 MG: at 13:18

## 2019-02-01 RX ADMIN — Medication 4 MG: at 19:48

## 2019-02-01 RX ADMIN — IPRATROPIUM BROMIDE AND ALBUTEROL SULFATE 1 AMPULE: .5; 3 SOLUTION RESPIRATORY (INHALATION) at 15:00

## 2019-02-01 RX ADMIN — Medication 2 G: at 14:23

## 2019-02-01 RX ADMIN — Medication 2 G: at 21:09

## 2019-02-01 RX ADMIN — HYDROCODONE BITARTRATE AND ACETAMINOPHEN 1 TABLET: 10; 325 TABLET ORAL at 08:15

## 2019-02-01 RX ADMIN — TIZANIDINE 2 MG: 2 TABLET ORAL at 14:23

## 2019-02-01 RX ADMIN — LORAZEPAM 0.5 MG: 0.5 TABLET ORAL at 15:17

## 2019-02-01 RX ADMIN — IPRATROPIUM BROMIDE AND ALBUTEROL SULFATE 1 AMPULE: .5; 3 SOLUTION RESPIRATORY (INHALATION) at 10:33

## 2019-02-01 RX ADMIN — METHYLPREDNISOLONE SODIUM SUCCINATE 40 MG: 40 INJECTION, POWDER, FOR SOLUTION INTRAMUSCULAR; INTRAVENOUS at 07:52

## 2019-02-01 RX ADMIN — Medication 4 MG: at 16:39

## 2019-02-01 RX ADMIN — PROMETHAZINE HYDROCHLORIDE 25 MG: 25 TABLET ORAL at 21:10

## 2019-02-01 RX ADMIN — Medication 4 MG: at 04:22

## 2019-02-01 RX ADMIN — SOTALOL HYDROCHLORIDE 160 MG: 80 TABLET ORAL at 21:10

## 2019-02-01 RX ADMIN — PROMETHAZINE HYDROCHLORIDE 25 MG: 25 TABLET ORAL at 13:27

## 2019-02-01 ASSESSMENT — ENCOUNTER SYMPTOMS
SINUS PRESSURE: 0
VOMITING: 0
TROUBLE SWALLOWING: 0
STRIDOR: 0
SINUS PAIN: 0
CHOKING: 0
PHOTOPHOBIA: 0
NAUSEA: 0

## 2019-02-01 ASSESSMENT — PAIN DESCRIPTION - ORIENTATION
ORIENTATION: LEFT

## 2019-02-01 ASSESSMENT — PAIN SCALES - GENERAL
PAINLEVEL_OUTOF10: 7
PAINLEVEL_OUTOF10: 6
PAINLEVEL_OUTOF10: 7
PAINLEVEL_OUTOF10: 7
PAINLEVEL_OUTOF10: 8
PAINLEVEL_OUTOF10: 7
PAINLEVEL_OUTOF10: 3
PAINLEVEL_OUTOF10: 7
PAINLEVEL_OUTOF10: 6
PAINLEVEL_OUTOF10: 7
PAINLEVEL_OUTOF10: 7
PAINLEVEL_OUTOF10: 6

## 2019-02-01 ASSESSMENT — PAIN DESCRIPTION - PAIN TYPE
TYPE: ACUTE PAIN

## 2019-02-01 ASSESSMENT — PAIN DESCRIPTION - LOCATION
LOCATION: RIB CAGE

## 2019-02-02 ENCOUNTER — APPOINTMENT (OUTPATIENT)
Dept: GENERAL RADIOLOGY | Age: 59
DRG: 180 | End: 2019-02-02
Payer: MEDICARE

## 2019-02-02 VITALS
BODY MASS INDEX: 37.41 KG/M2 | HEIGHT: 70 IN | HEART RATE: 77 BPM | DIASTOLIC BLOOD PRESSURE: 71 MMHG | OXYGEN SATURATION: 92 % | TEMPERATURE: 97.3 F | SYSTOLIC BLOOD PRESSURE: 109 MMHG | RESPIRATION RATE: 20 BRPM | WEIGHT: 261.31 LBS

## 2019-02-02 PROCEDURE — 2580000003 HC RX 258: Performed by: HOSPITALIST

## 2019-02-02 PROCEDURE — 71045 X-RAY EXAM CHEST 1 VIEW: CPT

## 2019-02-02 PROCEDURE — 6370000000 HC RX 637 (ALT 250 FOR IP): Performed by: INTERNAL MEDICINE

## 2019-02-02 PROCEDURE — 6360000002 HC RX W HCPCS: Performed by: INTERNAL MEDICINE

## 2019-02-02 PROCEDURE — 6370000000 HC RX 637 (ALT 250 FOR IP): Performed by: HOSPITALIST

## 2019-02-02 PROCEDURE — 99238 HOSP IP/OBS DSCHRG MGMT 30/<: CPT | Performed by: INTERNAL MEDICINE

## 2019-02-02 PROCEDURE — 2700000000 HC OXYGEN THERAPY PER DAY

## 2019-02-02 PROCEDURE — 6360000002 HC RX W HCPCS: Performed by: HOSPITALIST

## 2019-02-02 PROCEDURE — 6360000002 HC RX W HCPCS: Performed by: NURSE PRACTITIONER

## 2019-02-02 PROCEDURE — 94640 AIRWAY INHALATION TREATMENT: CPT

## 2019-02-02 RX ADMIN — SOTALOL HYDROCHLORIDE 160 MG: 80 TABLET ORAL at 07:43

## 2019-02-02 RX ADMIN — MAGNESIUM HYDROXIDE 30 ML: 400 SUSPENSION ORAL at 10:40

## 2019-02-02 RX ADMIN — Medication 2 G: at 13:57

## 2019-02-02 RX ADMIN — LORAZEPAM 0.5 MG: 0.5 TABLET ORAL at 07:44

## 2019-02-02 RX ADMIN — METHYLPREDNISOLONE SODIUM SUCCINATE 40 MG: 40 INJECTION, POWDER, FOR SOLUTION INTRAMUSCULAR; INTRAVENOUS at 07:43

## 2019-02-02 RX ADMIN — IPRATROPIUM BROMIDE AND ALBUTEROL SULFATE 1 AMPULE: .5; 3 SOLUTION RESPIRATORY (INHALATION) at 09:59

## 2019-02-02 RX ADMIN — Medication 10 ML: at 07:52

## 2019-02-02 RX ADMIN — Medication 4 MG: at 10:44

## 2019-02-02 RX ADMIN — IPRATROPIUM BROMIDE AND ALBUTEROL SULFATE 1 AMPULE: .5; 3 SOLUTION RESPIRATORY (INHALATION) at 06:45

## 2019-02-02 RX ADMIN — IPRATROPIUM BROMIDE AND ALBUTEROL SULFATE 1 AMPULE: .5; 3 SOLUTION RESPIRATORY (INHALATION) at 14:23

## 2019-02-02 RX ADMIN — HYDROCODONE BITARTRATE AND ACETAMINOPHEN 1 TABLET: 10; 325 TABLET ORAL at 07:44

## 2019-02-02 RX ADMIN — TIZANIDINE 2 MG: 2 TABLET ORAL at 10:40

## 2019-02-02 RX ADMIN — LORAZEPAM 0.5 MG: 0.5 TABLET ORAL at 13:57

## 2019-02-02 RX ADMIN — Medication 4 MG: at 01:59

## 2019-02-02 RX ADMIN — Medication 10 ML: at 01:59

## 2019-02-02 RX ADMIN — HYDROCODONE BITARTRATE AND ACETAMINOPHEN 1 TABLET: 10; 325 TABLET ORAL at 13:44

## 2019-02-02 RX ADMIN — Medication 2 G: at 07:36

## 2019-02-02 ASSESSMENT — PAIN SCALES - GENERAL
PAINLEVEL_OUTOF10: 6
PAINLEVEL_OUTOF10: 8
PAINLEVEL_OUTOF10: 8
PAINLEVEL_OUTOF10: 2
PAINLEVEL_OUTOF10: 8
PAINLEVEL_OUTOF10: 7
PAINLEVEL_OUTOF10: 7

## 2019-02-04 NOTE — TELEPHONE ENCOUNTER
Pt called and said she saw you in the hospital on Saturday stating that you wanted her to have a bronchoscopy on Wed. 02/06/19 in the afternoon.  Do you want this scheduled, if so I will need that order.

## 2019-02-05 NOTE — TELEPHONE ENCOUNTER
The hospital has completely blown up on us and I am still solo at the hospital.  It may have to wait till Friday.  See if she can do it on Friday.  Any time is ok.  Late am is best.

## 2019-02-05 NOTE — TELEPHONE ENCOUNTER
Pt said late AM on Friday is good for her and wants it done at [facility].  If you will place the order I will get it scheduled.

## 2019-02-06 PROBLEM — J98.11 ATELECTASIS: Status: ACTIVE | Noted: 2019-02-06

## 2019-02-08 ENCOUNTER — HOSPITAL ENCOUNTER (OUTPATIENT)
Age: 59
Setting detail: OUTPATIENT SURGERY
Discharge: HOME | End: 2019-02-08
Attending: INTERNAL MEDICINE | Admitting: INTERNAL MEDICINE
Payer: MEDICARE

## 2019-02-08 ENCOUNTER — ANESTHESIA (OUTPATIENT)
Dept: ENDOSCOPY | Age: 59
End: 2019-02-08
Payer: MEDICARE

## 2019-02-08 ENCOUNTER — ANESTHESIA EVENT (OUTPATIENT)
Dept: ENDOSCOPY | Age: 59
End: 2019-02-08
Payer: MEDICARE

## 2019-02-08 VITALS
OXYGEN SATURATION: 92 % | RESPIRATION RATE: 34 BRPM | DIASTOLIC BLOOD PRESSURE: 79 MMHG | SYSTOLIC BLOOD PRESSURE: 134 MMHG

## 2019-02-08 VITALS
SYSTOLIC BLOOD PRESSURE: 150 MMHG | OXYGEN SATURATION: 97 % | HEIGHT: 70 IN | RESPIRATION RATE: 22 BRPM | TEMPERATURE: 98.4 F | WEIGHT: 253 LBS | DIASTOLIC BLOOD PRESSURE: 89 MMHG | BODY MASS INDEX: 36.22 KG/M2 | HEART RATE: 90 BPM

## 2019-02-08 DIAGNOSIS — J98.11 ATELECTASIS: ICD-10-CM

## 2019-02-08 PROCEDURE — 3609011300 HC BRONCHOSCOPY BRONCHIAL/ENDOBRNCL BX 1+ SITES: Performed by: INTERNAL MEDICINE

## 2019-02-08 PROCEDURE — 6360000002 HC RX W HCPCS: Performed by: NURSE ANESTHETIST, CERTIFIED REGISTERED

## 2019-02-08 PROCEDURE — 2500000003 HC RX 250 WO HCPCS: Performed by: NURSE ANESTHETIST, CERTIFIED REGISTERED

## 2019-02-08 PROCEDURE — 6370000000 HC RX 637 (ALT 250 FOR IP): Performed by: INTERNAL MEDICINE

## 2019-02-08 PROCEDURE — 3700000001 HC ADD 15 MINUTES (ANESTHESIA): Performed by: INTERNAL MEDICINE

## 2019-02-08 PROCEDURE — 7100000011 HC PHASE II RECOVERY - ADDTL 15 MIN: Performed by: INTERNAL MEDICINE

## 2019-02-08 PROCEDURE — 87102 FUNGUS ISOLATION CULTURE: CPT

## 2019-02-08 PROCEDURE — 2580000003 HC RX 258: Performed by: INTERNAL MEDICINE

## 2019-02-08 PROCEDURE — 88112 CYTOPATH CELL ENHANCE TECH: CPT

## 2019-02-08 PROCEDURE — 2709999900 HC NON-CHARGEABLE SUPPLY: Performed by: INTERNAL MEDICINE

## 2019-02-08 PROCEDURE — 87070 CULTURE OTHR SPECIMN AEROBIC: CPT

## 2019-02-08 PROCEDURE — 3700000000 HC ANESTHESIA ATTENDED CARE: Performed by: INTERNAL MEDICINE

## 2019-02-08 PROCEDURE — 87015 SPECIMEN INFECT AGNT CONCNTJ: CPT

## 2019-02-08 PROCEDURE — 87205 SMEAR GRAM STAIN: CPT

## 2019-02-08 PROCEDURE — 7100000010 HC PHASE II RECOVERY - FIRST 15 MIN: Performed by: INTERNAL MEDICINE

## 2019-02-08 PROCEDURE — 2500000003 HC RX 250 WO HCPCS: Performed by: INTERNAL MEDICINE

## 2019-02-08 PROCEDURE — 87206 SMEAR FLUORESCENT/ACID STAI: CPT

## 2019-02-08 PROCEDURE — 87116 MYCOBACTERIA CULTURE: CPT

## 2019-02-08 RX ORDER — PROPOFOL 10 MG/ML
INJECTION, EMULSION INTRAVENOUS PRN
Status: DISCONTINUED | OUTPATIENT
Start: 2019-02-08 | End: 2019-02-08 | Stop reason: SDUPTHER

## 2019-02-08 RX ORDER — LIDOCAINE HYDROCHLORIDE 10 MG/ML
INJECTION, SOLUTION EPIDURAL; INFILTRATION; INTRACAUDAL; PERINEURAL PRN
Status: DISCONTINUED | OUTPATIENT
Start: 2019-02-08 | End: 2019-02-08 | Stop reason: SDUPTHER

## 2019-02-08 RX ORDER — SOTALOL HYDROCHLORIDE 80 MG/1
160 TABLET ORAL 2 TIMES DAILY
COMMUNITY

## 2019-02-08 RX ORDER — ONDANSETRON 2 MG/ML
4 INJECTION INTRAMUSCULAR; INTRAVENOUS ONCE
Status: COMPLETED | OUTPATIENT
Start: 2019-02-08 | End: 2019-02-08

## 2019-02-08 RX ORDER — LIDOCAINE HYDROCHLORIDE 40 MG/ML
2.5 INJECTION, SOLUTION RETROBULBAR; TOPICAL ONCE
Status: DISCONTINUED | OUTPATIENT
Start: 2019-02-08 | End: 2019-02-08 | Stop reason: HOSPADM

## 2019-02-08 RX ORDER — LIDOCAINE HYDROCHLORIDE 10 MG/ML
INJECTION, SOLUTION EPIDURAL; INFILTRATION; INTRACAUDAL; PERINEURAL PRN
Status: DISCONTINUED | OUTPATIENT
Start: 2019-02-08 | End: 2019-02-08 | Stop reason: HOSPADM

## 2019-02-08 RX ORDER — SODIUM CHLORIDE 9 MG/ML
INJECTION, SOLUTION INTRAVENOUS CONTINUOUS
Status: DISCONTINUED | OUTPATIENT
Start: 2019-02-13 | End: 2019-02-08 | Stop reason: HOSPADM

## 2019-02-08 RX ADMIN — ONDANSETRON 4 MG: 2 INJECTION INTRAMUSCULAR; INTRAVENOUS at 13:19

## 2019-02-08 RX ADMIN — LIDOCAINE HYDROCHLORIDE 50 MG: 10 INJECTION, SOLUTION EPIDURAL; INFILTRATION; INTRACAUDAL; PERINEURAL at 11:41

## 2019-02-08 RX ADMIN — PROPOFOL 130 MG: 10 INJECTION, EMULSION INTRAVENOUS at 11:41

## 2019-02-08 RX ADMIN — SODIUM CHLORIDE: 9 INJECTION, SOLUTION INTRAVENOUS at 11:00

## 2019-02-08 ASSESSMENT — PAIN SCALES - GENERAL
PAINLEVEL_OUTOF10: 0
PAINLEVEL_OUTOF10: 0

## 2019-02-08 ASSESSMENT — ENCOUNTER SYMPTOMS: SHORTNESS OF BREATH: 1

## 2019-02-08 ASSESSMENT — LIFESTYLE VARIABLES: SMOKING_STATUS: 0

## 2019-02-11 LAB
CULTURE, RESPIRATORY: NORMAL
GRAM STAIN RESULT: NORMAL

## 2019-02-14 ENCOUNTER — TELEPHONE (OUTPATIENT)
Dept: CARDIOTHORACIC SURGERY | Age: 59
End: 2019-02-14

## 2019-02-18 PROBLEM — J90 RECURRENT PLEURAL EFFUSION ON LEFT: Status: ACTIVE | Noted: 2019-01-01

## 2019-02-18 PROBLEM — R91.8 ENDOBRONCHIAL MASS: Status: ACTIVE | Noted: 2019-01-01

## 2019-02-18 NOTE — PROGRESS NOTES
LATONIA Leon  [facility]   Respiratory Disease Clinic  [address] [place] [address]  Phone: [phone]  Fax: [phone]     Cary Ferris is a 58 y.o. female.   CC:   Chief Complaint   Patient presents with   • Shortness of Breath        HPI: Mrs. Ferris is coming in for a follow-up after her recent bronchoscopy.  Per the bronchoscopy report there was no visible tumor in the left bronchus.  Cytology was negative for malignancies.  In addition to this issue she has a recurrent effusion.  The abdomen pleural fluid analysis on this as well which is not shown any malignancy.  She has a history of a lung cancer resection on that same side.  She does unfortunately continue to smoke.  She has tried numerous bronchodilators in the past but only improved with the use of Ventolin.  Her only complaint today is shooting left-sided chest wall tenderness that is intermittent and infrequent.    The following portions of the patient's history were reviewed and updated as appropriate: allergies, current medications, past family history, past medical history, past social history, past surgical history and problem list.    Past Medical History:   Diagnosis Date   • Adenocarcinoma of lung (CMS/HCC) 2014   • Anemia     d/t chemotherapy   • Anxiety    • Atrial fibrillation (CMS/HCC)    • Chronic pain    • COPD (chronic obstructive pulmonary disease) (CMS/HCC)    • Coronary artery disease    • Depression    • DVT (deep venous thrombosis) (CMS/HCC)     left leg, in past   • Endobronchial mass 2/18/2019   • Generalized headaches    • High serum carcinoembryonic antigen (CEA)    • History of radiation therapy     6300 cGy to lung completed 8-6-2014; prophylactic brain radiation 2600 cGy completed 4-   • Hx of degenerative disc disease    • Hyperglycemia    • Hyperlipidemia    • Hypertension    • Meningitis    • Morbid obesity (CMS/HCC)    • Nephrolithiasis    • Osteoarthritis    • Oxygen  desaturation during sleep     pt states oxygen company came and got oxygen ,did not qualify for usage   • Personal history of chemotherapy     For Lung Cancer   • Recurrent pleural effusion on left 2019       Family History   Problem Relation Age of Onset   • Heart failure Mother    • Prostate cancer Father    • No Known Problems Sister    • Cancer Brother         skin cancer   • Lung cancer Sister    • Cancer Sister         skin cancer       Social History     Socioeconomic History   • Marital status:      Spouse name: Not on file   • Number of children: 0   • Years of education: Not on file   • Highest education level: Not on file   Social Needs   • Financial resource strain: Not on file   • Food insecurity - worry: Not on file   • Food insecurity - inability: Not on file   • Transportation needs - medical: Not on file   • Transportation needs - non-medical: Not on file   Occupational History   • Not on file   Tobacco Use   • Smoking status: Former Smoker     Packs/day: 1.50     Types: Cigarettes     Last attempt to quit: 2013     Years since quittin.2   • Smokeless tobacco: Never Used   Substance and Sexual Activity   • Alcohol use: No     Comment: social   • Drug use: No   • Sexual activity: Defer   Other Topics Concern   • Not on file   Social History Narrative   • Not on file       Review of Systems   Constitutional: Positive for fatigue. Negative for activity change, chills and fever.   HENT: Negative for congestion, postnasal drip, rhinorrhea, sinus pressure, sore throat and trouble swallowing.    Eyes: Negative for blurred vision, double vision and pain.   Respiratory: Positive for cough and shortness of breath. Negative for chest tightness and wheezing.    Cardiovascular: Positive for chest pain. Negative for palpitations and leg swelling.   Gastrointestinal: Negative for abdominal distention, constipation, diarrhea, nausea and vomiting.   Endocrine: Negative for polydipsia,  polyphagia and polyuria.   Genitourinary: Negative for dysuria, frequency and urgency.   Musculoskeletal: Negative for arthralgias, back pain, gait problem and joint swelling.   Skin: Negative for color change, dry skin, rash and skin lesions.   Allergic/Immunologic: Negative for environmental allergies, food allergies and immunocompromised state.   Neurological: Positive for weakness. Negative for dizziness, seizures, speech difficulty, light-headedness, memory problem and confusion.   Hematological: Negative for adenopathy. Does not bruise/bleed easily.   Psychiatric/Behavioral: Negative for sleep disturbance, negative for hyperactivity and depressed mood. The patient is not nervous/anxious.        .vs    Physical Exam   Constitutional: She is oriented to person, place, and time. She appears well-developed and well-nourished. No distress.   HENT:   Head: Normocephalic and atraumatic.   Right Ear: External ear normal.   Left Ear: External ear normal.   Nose: Nose normal.   Mouth/Throat: Oropharynx is clear and moist. No oropharyngeal exudate.   Eyes: Conjunctivae and EOM are normal. Pupils are equal, round, and reactive to light. Right eye exhibits no discharge. Left eye exhibits no discharge.   Neck: Normal range of motion. Neck supple. No JVD present.   Cardiovascular: Normal rate and regular rhythm.   No murmur heard.  Pulmonary/Chest: Effort normal. No respiratory distress. She has decreased breath sounds in the left lower field. She has no wheezes.   Mild bruising noted to left lateral chest wall, well-healed thoracostomy site   Abdominal: Soft. Bowel sounds are normal. She exhibits no distension. There is no tenderness.   Musculoskeletal: Normal range of motion. She exhibits no edema or deformity.   Neurological: She is alert and oriented to person, place, and time. She displays normal reflexes. No cranial nerve deficit. Coordination normal.   Skin: Skin is warm and dry. No rash noted. She is not diaphoretic.  No erythema.   Psychiatric: She has a normal mood and affect. Her behavior is normal. Thought content normal.   Nursing note and vitals reviewed.      Pulmonary Functions Testing Results:    FEV1   Date Value Ref Range Status   11/15/2018 50% liters Final     FVC   Date Value Ref Range Status   11/15/2018 64% liters Final     FEV1/FVC   Date Value Ref Range Status   11/15/2018 62.81% % Final     No PFTs for this visit    CXR: No imaging for this visit        Problem List Items Addressed This Visit        Cardiovascular and Mediastinum    Chronic systolic (congestive) heart failure (CMS/HCC)       Respiratory    Small cell lung cancer (CMS/HCC)    Relevant Medications    promethazine (PHENERGAN) 25 MG tablet    Recurrent pleural effusion on left    Relevant Medications    promethazine (PHENERGAN) 25 MG tablet    Endobronchial mass - Primary    Relevant Medications    promethazine (PHENERGAN) 25 MG tablet    Other Relevant Orders    CBC, No Differential / Platelet ([facility])    Bronchoscopy Diagnostic       Digestive    Morbid obesity due to excess calories (CMS/HCC)       Nervous and Auditory    Chronic pain syndrome       Other    S/P radiation therapy    Personal history of nicotine dependence        Patient's Body mass index is 35.73 kg/m². BMI is above normal parameters. Recommendations include: educational material.    Patient is off all blood thinners at this time.  Orders have been placed for bronchoscopy tomorrow with Dr. Hernandez at [facility] with monitored anesthesia.  This may have to be adjusted based off of availability at [facility].  CBC with platelets obtained which should be available to us in the morning.  Appointment 1 week post bronchoscopy for results    LATONIA Leon  2/19/2019  4:13 PM    Return in about 1 week (around 2/26/2019).

## 2019-02-19 NOTE — PATIENT INSTRUCTIONS
Patient has been instructed to stay off of her Coumadin.  She is to avoid any aspirin, over-the-counter NSAIDs, vitamin E or fish oil.  Attempting to schedule this for tomorrow afternoon at [facility] with monitored anesthesia if there are schedule allows.  Patient instructed to keep her phone handy for the remainder of the day to receive instructions    BMI for Adults  Body mass index (BMI) is a number that is calculated from a person's weight and height. In most adults, the number is used to find how much of an adult's weight is made up of fat. BMI is not as accurate as a direct measure of body fat.  How is BMI calculated?  BMI is calculated by dividing weight in kilograms by height in meters squared. It can also be calculated by dividing weight in pounds by height in inches squared, then multiplying the resulting number by 703. Charts are available to help you find your BMI quickly and easily without doing this calculation.  How is BMI interpreted?  Health care professionals use BMI charts to identify whether an adult is underweight, at a normal weight, or overweight based on the following guidelines:  · Underweight: BMI less than 18.5.  · Normal weight: BMI between 18.5 and 24.9.  · Overweight: BMI between 25 and 29.9.  · Obese: BMI of 30 and above.    BMI is usually interpreted the same for males and females.  Weight includes both fat and muscle, so someone with a muscular build, such as an athlete, may have a BMI that is higher than 24.9. In cases like these, BMI may not accurately depict body fat. To determine if excess body fat is the cause of a BMI of 25 or higher, further assessments may need to be done by a health care provider.  Why is BMI a useful tool?  BMI is used to identify a possible weight problem that may be related to a medical problem or may increase the risk for medical problems. BMI can also be used to promote changes to reach a healthy weight.  This information is not intended to replace advice  given to you by your health care provider. Make sure you discuss any questions you have with your health care provider.  Document Released: 08/29/2005 Document Revised: 04/27/2017 Document Reviewed: 05/15/2015  Elsevier Interactive Patient Education © 2018 [employer]

## 2019-02-20 ENCOUNTER — HOSPITAL ENCOUNTER (OUTPATIENT)
Age: 59
Setting detail: OUTPATIENT SURGERY
Discharge: HOME | End: 2019-02-20
Attending: INTERNAL MEDICINE | Admitting: INTERNAL MEDICINE
Payer: MEDICARE

## 2019-02-20 ENCOUNTER — ANESTHESIA (OUTPATIENT)
Dept: ENDOSCOPY | Age: 59
End: 2019-02-20
Payer: MEDICARE

## 2019-02-20 ENCOUNTER — ANESTHESIA EVENT (OUTPATIENT)
Dept: ENDOSCOPY | Age: 59
End: 2019-02-20
Payer: MEDICARE

## 2019-02-20 VITALS
DIASTOLIC BLOOD PRESSURE: 94 MMHG | HEART RATE: 83 BPM | BODY MASS INDEX: 35.79 KG/M2 | RESPIRATION RATE: 22 BRPM | OXYGEN SATURATION: 92 % | SYSTOLIC BLOOD PRESSURE: 151 MMHG | HEIGHT: 70 IN | TEMPERATURE: 98.2 F | WEIGHT: 250 LBS

## 2019-02-20 VITALS
RESPIRATION RATE: 10 BRPM | DIASTOLIC BLOOD PRESSURE: 64 MMHG | TEMPERATURE: 98 F | SYSTOLIC BLOOD PRESSURE: 107 MMHG | OXYGEN SATURATION: 97 %

## 2019-02-20 PROBLEM — Z87.891 PERSONAL HISTORY OF NICOTINE DEPENDENCE: Status: ACTIVE | Noted: 2018-11-13

## 2019-02-20 PROBLEM — E66.01 MORBID OBESITY DUE TO EXCESS CALORIES (HCC): Status: ACTIVE | Noted: 2017-05-26

## 2019-02-20 PROBLEM — N20.1 URETEROLITHIASIS: Status: ACTIVE | Noted: 2018-05-16

## 2019-02-20 PROBLEM — J30.1 SEASONAL ALLERGIC RHINITIS DUE TO POLLEN: Status: ACTIVE | Noted: 2018-11-13

## 2019-02-20 PROBLEM — I50.22 CHRONIC SYSTOLIC (CONGESTIVE) HEART FAILURE (HCC): Status: ACTIVE | Noted: 2018-11-13

## 2019-02-20 PROBLEM — G89.4 CHRONIC PAIN DISORDER: Status: ACTIVE | Noted: 2018-11-13

## 2019-02-20 PROCEDURE — 88112 CYTOPATH CELL ENHANCE TECH: CPT

## 2019-02-20 PROCEDURE — 87116 MYCOBACTERIA CULTURE: CPT

## 2019-02-20 PROCEDURE — 87158 CULTURE TYPING ADDED METHOD: CPT

## 2019-02-20 PROCEDURE — 87106 FUNGI IDENTIFICATION YEAST: CPT

## 2019-02-20 PROCEDURE — 2500000003 HC RX 250 WO HCPCS: Performed by: INTERNAL MEDICINE

## 2019-02-20 PROCEDURE — 2580000003 HC RX 258: Performed by: NURSE ANESTHETIST, CERTIFIED REGISTERED

## 2019-02-20 PROCEDURE — 7100000011 HC PHASE II RECOVERY - ADDTL 15 MIN: Performed by: INTERNAL MEDICINE

## 2019-02-20 PROCEDURE — 3700000000 HC ANESTHESIA ATTENDED CARE: Performed by: INTERNAL MEDICINE

## 2019-02-20 PROCEDURE — 2580000003 HC RX 258: Performed by: INTERNAL MEDICINE

## 2019-02-20 PROCEDURE — 7100000000 HC PACU RECOVERY - FIRST 15 MIN: Performed by: INTERNAL MEDICINE

## 2019-02-20 PROCEDURE — 6360000002 HC RX W HCPCS: Performed by: NURSE ANESTHETIST, CERTIFIED REGISTERED

## 2019-02-20 PROCEDURE — 7100000010 HC PHASE II RECOVERY - FIRST 15 MIN: Performed by: INTERNAL MEDICINE

## 2019-02-20 PROCEDURE — 87070 CULTURE OTHR SPECIMN AEROBIC: CPT

## 2019-02-20 PROCEDURE — 87102 FUNGUS ISOLATION CULTURE: CPT

## 2019-02-20 PROCEDURE — 3700000001 HC ADD 15 MINUTES (ANESTHESIA): Performed by: INTERNAL MEDICINE

## 2019-02-20 PROCEDURE — 6360000002 HC RX W HCPCS: Performed by: ANESTHESIOLOGY

## 2019-02-20 PROCEDURE — 2709999900 HC NON-CHARGEABLE SUPPLY: Performed by: INTERNAL MEDICINE

## 2019-02-20 PROCEDURE — 87205 SMEAR GRAM STAIN: CPT

## 2019-02-20 PROCEDURE — 7100000001 HC PACU RECOVERY - ADDTL 15 MIN: Performed by: INTERNAL MEDICINE

## 2019-02-20 PROCEDURE — 87206 SMEAR FLUORESCENT/ACID STAI: CPT

## 2019-02-20 PROCEDURE — 2500000003 HC RX 250 WO HCPCS: Performed by: NURSE ANESTHETIST, CERTIFIED REGISTERED

## 2019-02-20 PROCEDURE — 87015 SPECIMEN INFECT AGNT CONCNTJ: CPT

## 2019-02-20 PROCEDURE — 3609010800 HC BRONCHOSCOPY ALVEOLAR LAVAGE: Performed by: INTERNAL MEDICINE

## 2019-02-20 RX ORDER — ONDANSETRON 2 MG/ML
INJECTION INTRAMUSCULAR; INTRAVENOUS PRN
Status: DISCONTINUED | OUTPATIENT
Start: 2019-02-20 | End: 2019-02-20 | Stop reason: SDUPTHER

## 2019-02-20 RX ORDER — DEXAMETHASONE SODIUM PHOSPHATE 10 MG/ML
INJECTION INTRAMUSCULAR; INTRAVENOUS PRN
Status: DISCONTINUED | OUTPATIENT
Start: 2019-02-20 | End: 2019-02-20 | Stop reason: SDUPTHER

## 2019-02-20 RX ORDER — MIDAZOLAM HYDROCHLORIDE 1 MG/ML
2 INJECTION INTRAMUSCULAR; INTRAVENOUS
Status: COMPLETED | OUTPATIENT
Start: 2019-02-20 | End: 2019-02-20

## 2019-02-20 RX ORDER — SCOLOPAMINE TRANSDERMAL SYSTEM 1 MG/1
1 PATCH, EXTENDED RELEASE TRANSDERMAL ONCE
Status: DISCONTINUED | OUTPATIENT
Start: 2019-02-20 | End: 2019-02-20 | Stop reason: HOSPADM

## 2019-02-20 RX ORDER — SODIUM CHLORIDE 0.9 % (FLUSH) 0.9 %
10 SYRINGE (ML) INJECTION EVERY 12 HOURS SCHEDULED
Status: DISCONTINUED | OUTPATIENT
Start: 2019-02-20 | End: 2019-02-20 | Stop reason: HOSPADM

## 2019-02-20 RX ORDER — FENTANYL CITRATE 50 UG/ML
50 INJECTION, SOLUTION INTRAMUSCULAR; INTRAVENOUS
Status: DISCONTINUED | OUTPATIENT
Start: 2019-02-20 | End: 2019-02-20 | Stop reason: HOSPADM

## 2019-02-20 RX ORDER — SUCCINYLCHOLINE CHLORIDE 20 MG/ML
INJECTION INTRAMUSCULAR; INTRAVENOUS PRN
Status: DISCONTINUED | OUTPATIENT
Start: 2019-02-20 | End: 2019-02-20 | Stop reason: SDUPTHER

## 2019-02-20 RX ORDER — SODIUM CHLORIDE, SODIUM LACTATE, POTASSIUM CHLORIDE, CALCIUM CHLORIDE 600; 310; 30; 20 MG/100ML; MG/100ML; MG/100ML; MG/100ML
INJECTION, SOLUTION INTRAVENOUS CONTINUOUS
Status: DISCONTINUED | OUTPATIENT
Start: 2019-02-20 | End: 2019-02-20 | Stop reason: HOSPADM

## 2019-02-20 RX ORDER — SODIUM CHLORIDE 0.9 % (FLUSH) 0.9 %
10 SYRINGE (ML) INJECTION PRN
Status: DISCONTINUED | OUTPATIENT
Start: 2019-02-20 | End: 2019-02-20 | Stop reason: HOSPADM

## 2019-02-20 RX ORDER — LIDOCAINE HYDROCHLORIDE 10 MG/ML
1 INJECTION, SOLUTION EPIDURAL; INFILTRATION; INTRACAUDAL; PERINEURAL
Status: DISCONTINUED | OUTPATIENT
Start: 2019-02-20 | End: 2019-02-20 | Stop reason: HOSPADM

## 2019-02-20 RX ORDER — ROCURONIUM BROMIDE 10 MG/ML
INJECTION, SOLUTION INTRAVENOUS PRN
Status: DISCONTINUED | OUTPATIENT
Start: 2019-02-20 | End: 2019-02-20 | Stop reason: SDUPTHER

## 2019-02-20 RX ORDER — LIDOCAINE HYDROCHLORIDE 10 MG/ML
1 INJECTION, SOLUTION EPIDURAL; INFILTRATION; INTRACAUDAL; PERINEURAL ONCE
Status: COMPLETED | OUTPATIENT
Start: 2019-02-20 | End: 2019-02-20

## 2019-02-20 RX ORDER — PROPOFOL 10 MG/ML
INJECTION, EMULSION INTRAVENOUS PRN
Status: DISCONTINUED | OUTPATIENT
Start: 2019-02-20 | End: 2019-02-20 | Stop reason: SDUPTHER

## 2019-02-20 RX ORDER — FENTANYL CITRATE 50 UG/ML
INJECTION, SOLUTION INTRAMUSCULAR; INTRAVENOUS PRN
Status: DISCONTINUED | OUTPATIENT
Start: 2019-02-20 | End: 2019-02-20 | Stop reason: SDUPTHER

## 2019-02-20 RX ORDER — LIDOCAINE HYDROCHLORIDE 10 MG/ML
INJECTION, SOLUTION INFILTRATION; PERINEURAL PRN
Status: DISCONTINUED | OUTPATIENT
Start: 2019-02-20 | End: 2019-02-20 | Stop reason: SDUPTHER

## 2019-02-20 RX ORDER — SODIUM CHLORIDE, SODIUM LACTATE, POTASSIUM CHLORIDE, CALCIUM CHLORIDE 600; 310; 30; 20 MG/100ML; MG/100ML; MG/100ML; MG/100ML
INJECTION, SOLUTION INTRAVENOUS CONTINUOUS PRN
Status: DISCONTINUED | OUTPATIENT
Start: 2019-02-20 | End: 2019-02-20 | Stop reason: SDUPTHER

## 2019-02-20 RX ADMIN — SODIUM CHLORIDE, POTASSIUM CHLORIDE, SODIUM LACTATE AND CALCIUM CHLORIDE: 600; 310; 30; 20 INJECTION, SOLUTION INTRAVENOUS at 13:15

## 2019-02-20 RX ADMIN — SODIUM CHLORIDE, SODIUM LACTATE, POTASSIUM CHLORIDE, AND CALCIUM CHLORIDE: 600; 310; 30; 20 INJECTION, SOLUTION INTRAVENOUS at 15:02

## 2019-02-20 RX ADMIN — MIDAZOLAM 2 MG: 1 INJECTION INTRAMUSCULAR; INTRAVENOUS at 14:05

## 2019-02-20 RX ADMIN — ONDANSETRON HYDROCHLORIDE 4 MG: 2 INJECTION, SOLUTION INTRAMUSCULAR; INTRAVENOUS at 15:12

## 2019-02-20 RX ADMIN — SUGAMMADEX 500 MG: 100 INJECTION, SOLUTION INTRAVENOUS at 15:23

## 2019-02-20 RX ADMIN — LIDOCAINE HYDROCHLORIDE 50 MG: 10 INJECTION, SOLUTION INFILTRATION; PERINEURAL at 15:06

## 2019-02-20 RX ADMIN — FENTANYL CITRATE 50 MCG: 50 INJECTION INTRAMUSCULAR; INTRAVENOUS at 15:11

## 2019-02-20 RX ADMIN — ROCURONIUM BROMIDE 5 MG: 10 INJECTION INTRAVENOUS at 15:06

## 2019-02-20 RX ADMIN — FENTANYL CITRATE 50 MCG: 50 INJECTION INTRAMUSCULAR; INTRAVENOUS at 15:06

## 2019-02-20 RX ADMIN — SUCCINYLCHOLINE CHLORIDE 100 MG: 20 INJECTION, SOLUTION INTRAMUSCULAR; INTRAVENOUS; PARENTERAL at 15:06

## 2019-02-20 RX ADMIN — LIDOCAINE HYDROCHLORIDE 1 ML: 10 INJECTION, SOLUTION EPIDURAL; INFILTRATION; INTRACAUDAL; PERINEURAL at 13:15

## 2019-02-20 RX ADMIN — PROPOFOL 200 MG: 10 INJECTION, EMULSION INTRAVENOUS at 15:06

## 2019-02-20 RX ADMIN — DEXAMETHASONE SODIUM PHOSPHATE 10 MG: 10 INJECTION INTRAMUSCULAR; INTRAVENOUS at 15:10

## 2019-02-20 ASSESSMENT — PAIN - FUNCTIONAL ASSESSMENT: PAIN_FUNCTIONAL_ASSESSMENT: 0-10

## 2019-02-20 ASSESSMENT — LIFESTYLE VARIABLES: SMOKING_STATUS: 0

## 2019-02-22 ENCOUNTER — HOSPITAL ENCOUNTER (OUTPATIENT)
Dept: CT IMAGING | Age: 59
Discharge: HOME | End: 2019-02-22
Payer: MEDICARE

## 2019-02-22 ENCOUNTER — APPOINTMENT (OUTPATIENT)
Dept: NUCLEAR MEDICINE | Age: 59
End: 2019-02-22
Payer: MEDICARE

## 2019-02-22 DIAGNOSIS — C34.12 CANCER OF BRONCHUS OF LEFT UPPER LOBE (HCC): ICD-10-CM

## 2019-02-22 LAB
CULTURE, RESPIRATORY: NORMAL
GRAM STAIN RESULT: NORMAL

## 2019-02-22 PROCEDURE — 70450 CT HEAD/BRAIN W/O DYE: CPT

## 2019-02-25 PROBLEM — C34.92 NON-SMALL CELL CANCER OF LEFT LUNG (HCC): Status: ACTIVE | Noted: 2019-01-01

## 2019-02-25 NOTE — PROGRESS NOTES
LATONIA Leon  [facility]   Respiratory Disease Clinic  [address] [place] [address]  Phone: [phone]  Fax: [phone]     Cary Ferris is a 58 y.o. female.   CC:   Chief Complaint   Patient presents with   • Shortness of Breath        HPI: Mrs. Ferris is coming in for a follow-up post bronchoscopy.  She did undergo follow-up bronchoscopy with Dr. Hernandez at [facility] last week for biopsy of an endobronchial tumor.  There were no complications.  She is here for the results of those biopsies.  She continues to have shortness of breath.  She said no fever or chills.  No sputum production.  Some nausea and increased fatigue.    The following portions of the patient's history were reviewed and updated as appropriate: allergies, current medications, past family history, past medical history, past social history, past surgical history and problem list.    Past Medical History:   Diagnosis Date   • Adenocarcinoma of lung (CMS/HCC) 2014   • Anemia     d/t chemotherapy   • Anxiety    • Atrial fibrillation (CMS/HCC)    • Chronic pain    • COPD (chronic obstructive pulmonary disease) (CMS/HCC)    • Coronary artery disease    • Depression    • DVT (deep venous thrombosis) (CMS/HCC)     left leg, in past   • Endobronchial mass 2/18/2019   • Generalized headaches    • High serum carcinoembryonic antigen (CEA)    • History of radiation therapy     6300 cGy to lung completed 8-6-2014; prophylactic brain radiation 2600 cGy completed 4-   • Hx of degenerative disc disease    • Hyperglycemia    • Hyperlipidemia    • Hypertension    • Meningitis    • Morbid obesity (CMS/HCC)    • Nephrolithiasis    • Non-small cell cancer of left lung (CMS/HCC) 2/25/2019   • Osteoarthritis    • Oxygen desaturation during sleep     pt states oxygen company came and got oxygen ,did not qualify for usage   • Personal history of chemotherapy     For Lung Cancer   • Recurrent pleural effusion on left  2019       Family History   Problem Relation Age of Onset   • Heart failure Mother    • Prostate cancer Father    • No Known Problems Sister    • Cancer Brother         skin cancer   • Lung cancer Sister    • Cancer Sister         skin cancer       Social History     Socioeconomic History   • Marital status:      Spouse name: Not on file   • Number of children: 0   • Years of education: Not on file   • Highest education level: Not on file   Social Needs   • Financial resource strain: Not on file   • Food insecurity - worry: Not on file   • Food insecurity - inability: Not on file   • Transportation needs - medical: Not on file   • Transportation needs - non-medical: Not on file   Occupational History   • Not on file   Tobacco Use   • Smoking status: Former Smoker     Packs/day: 1.50     Years: 40.00     Pack years: 60.00     Types: Cigarettes     Last attempt to quit: 2013     Years since quittin.2   • Smokeless tobacco: Never Used   Substance and Sexual Activity   • Alcohol use: No     Comment: social   • Drug use: No   • Sexual activity: Defer   Other Topics Concern   • Not on file   Social History Narrative   • Not on file       Review of Systems   Constitutional: Positive for fatigue. Negative for activity change, chills and fever.   HENT: Positive for congestion. Negative for postnasal drip, rhinorrhea, sinus pressure, sore throat and trouble swallowing.    Eyes: Negative for blurred vision, double vision and pain.   Respiratory: Positive for cough, chest tightness and shortness of breath. Negative for wheezing.    Cardiovascular: Negative for chest pain, palpitations and leg swelling.   Gastrointestinal: Positive for nausea. Negative for abdominal distention, constipation, diarrhea and vomiting.   Endocrine: Negative for polydipsia, polyphagia and polyuria.   Genitourinary: Negative for dysuria, frequency and urgency.   Musculoskeletal: Negative for arthralgias, back pain, gait problem  and joint swelling.   Skin: Negative for color change, dry skin, rash and skin lesions.   Allergic/Immunologic: Negative for environmental allergies, food allergies and immunocompromised state.   Neurological: Negative for dizziness, seizures, speech difficulty, weakness, light-headedness, memory problem and confusion.   Hematological: Negative for adenopathy. Does not bruise/bleed easily.   Psychiatric/Behavioral: Negative for sleep disturbance, negative for hyperactivity and depressed mood. The patient is not nervous/anxious.        .vs    Physical Exam   Constitutional: She is oriented to person, place, and time. She appears well-developed and well-nourished. No distress. Nasal cannula in place.   HENT:   Head: Normocephalic and atraumatic.   Right Ear: External ear normal.   Left Ear: External ear normal.   Nose: Nose normal.   Mouth/Throat: Oropharynx is clear and moist. No oropharyngeal exudate.   Eyes: Conjunctivae and EOM are normal. Pupils are equal, round, and reactive to light. Right eye exhibits no discharge. Left eye exhibits no discharge.   Neck: Normal range of motion. Neck supple. No JVD present.   Cardiovascular: Normal rate and regular rhythm.   No murmur heard.  Pulmonary/Chest: Effort normal. No respiratory distress. She has decreased breath sounds in the right middle field and the right lower field. She has no wheezes.   Abdominal: Soft. Bowel sounds are normal. She exhibits no distension. There is no tenderness.   Musculoskeletal: Normal range of motion. She exhibits no edema or deformity.   Neurological: She is alert and oriented to person, place, and time. She displays normal reflexes. No cranial nerve deficit. Coordination normal.   Skin: Skin is warm. No rash noted. She is diaphoretic. No erythema.   Psychiatric: She has a normal mood and affect. Her behavior is normal. Thought content normal.   Nursing note and vitals reviewed.      Pulmonary Functions Testing Results:    FEV1   Date Value  Ref Range Status   11/15/2018 50% liters Final     FVC   Date Value Ref Range Status   11/15/2018 64% liters Final     FEV1/FVC   Date Value Ref Range Status   11/15/2018 62.81% % Final     No PFTs for this visit    CXR: No imaging for this visit        Problem List Items Addressed This Visit        Cardiovascular and Mediastinum    Chronic systolic (congestive) heart failure (CMS/HCC)       Respiratory    Small cell lung cancer (CMS/HCC)    Relevant Medications    albuterol (PROVENTIL) (2.5 MG/3ML) 0.083% nebulizer solution    Seasonal allergic rhinitis due to pollen    Recurrent pleural effusion on left    Relevant Medications    albuterol (PROVENTIL) (2.5 MG/3ML) 0.083% nebulizer solution    Endobronchial mass    Relevant Medications    albuterol (PROVENTIL) (2.5 MG/3ML) 0.083% nebulizer solution    Non-small cell cancer of left lung (CMS/HCC) - Primary    Relevant Medications    albuterol (PROVENTIL) (2.5 MG/3ML) 0.083% nebulizer solution    methylPREDNISolone acetate (DEPO-medrol) injection 80 mg (Start on 2/26/2019  4:45 PM)    Other Relevant Orders    Home Nebulizer    Home Nebulizer Accessories       Digestive    Morbid obesity due to excess calories (CMS/HCC)       Nervous and Auditory    Chronic pain syndrome       Other    S/P radiation therapy    Personal history of nicotine dependence      Other Visit Diagnoses     Chronic obstructive pulmonary disease with acute exacerbation (CMS/[facility])        Relevant Medications    albuterol (PROVENTIL) (2.5 MG/3ML) 0.083% nebulizer solution    methylPREDNISolone acetate (DEPO-medrol) injection 80 mg (Start on 2/26/2019  4:45 PM)    Other Relevant Orders    Home Nebulizer    Home Nebulizer Accessories        Patient's Body mass index is 35.73 kg/m². BMI is above normal parameters. Recommendations include: educational material.    Bronchial washes and brushes were positive for non-small cell lung cancer.  She will need to follow-up with her oncologist in regards to  this.  She has an appointment with him in the next 2 weeks.  Give her Depo-Medrol here in the office today.  Also prescribed a nebulizer to help keep her airways open since she is having some difficulty breathing.  We did discuss that this will likely not improve entirely until she has been treated for the endobronchial tumor.  If symptoms worsen significantly or she becomes concerned she should proceed to the emergency room.  Otherwise she will await her follow-up with oncology and see us back in 3 months.    Anastasia Varner, APRN  2/26/2019  3:55 PM    Return in about 3 months (around 5/26/2019).

## 2019-02-26 NOTE — PATIENT INSTRUCTIONS

## 2019-02-28 ENCOUNTER — HOSPITAL ENCOUNTER (OUTPATIENT)
Dept: NUCLEAR MEDICINE | Age: 59
Discharge: HOME | End: 2019-03-02
Payer: MEDICARE

## 2019-02-28 DIAGNOSIS — C34.90 ADENOCARCINOMA OF LUNG, UNSPECIFIED LATERALITY (HCC): ICD-10-CM

## 2019-02-28 LAB
GLUCOSE BLD-MCNC: 123 MG/DL (ref 70–99)
PERFORMED ON: ABNORMAL

## 2019-02-28 PROCEDURE — 3430000000 HC RX DIAGNOSTIC RADIOPHARMACEUTICAL: Performed by: INTERNAL MEDICINE

## 2019-02-28 PROCEDURE — 78815 PET IMAGE W/CT SKULL-THIGH: CPT

## 2019-02-28 PROCEDURE — 82948 REAGENT STRIP/BLOOD GLUCOSE: CPT

## 2019-02-28 PROCEDURE — A9552 F18 FDG: HCPCS | Performed by: INTERNAL MEDICINE

## 2019-02-28 RX ORDER — FLUDEOXYGLUCOSE F 18 200 MCI/ML
10 INJECTION, SOLUTION INTRAVENOUS
Status: COMPLETED | OUTPATIENT
Start: 2019-02-28 | End: 2019-02-28

## 2019-02-28 RX ADMIN — FLUDEOXYGLUCOSE F 18 10 MILLICURIE: 200 INJECTION, SOLUTION INTRAVENOUS at 11:29

## 2019-03-05 ENCOUNTER — TRANSCRIBE ORDERS (OUTPATIENT)
Dept: ADMINISTRATIVE | Facility: HOSPITAL | Age: 59
End: 2019-03-05

## 2019-03-05 DIAGNOSIS — C34.12 PRIMARY MALIGNANT NEOPLASM OF BRONCHUS OF LEFT UPPER LOBE (HCC): Primary | ICD-10-CM

## 2019-03-07 ENCOUNTER — LAB (OUTPATIENT)
Dept: LAB | Facility: HOSPITAL | Age: 59
End: 2019-03-07

## 2019-03-07 DIAGNOSIS — C34.90 ADENOCARCINOMA OF LUNG, UNSPECIFIED LATERALITY (HCC): ICD-10-CM

## 2019-03-07 LAB
ALBUMIN SERPL-MCNC: 3.4 G/DL (ref 3.5–5)
ALBUMIN/GLOB SERPL: 1.5 G/DL (ref 1.1–2.5)
ALP SERPL-CCNC: 525 U/L (ref 24–120)
ALT SERPL W P-5'-P-CCNC: 219 U/L (ref 0–54)
ANION GAP SERPL CALCULATED.3IONS-SCNC: 14 MMOL/L (ref 4–13)
AST SERPL-CCNC: 225 U/L (ref 7–45)
BASOPHILS # BLD AUTO: 0.04 10*3/MM3 (ref 0–0.2)
BASOPHILS NFR BLD AUTO: 0.2 % (ref 0–2)
BILIRUB SERPL-MCNC: 4.4 MG/DL (ref 0.1–1)
BUN BLD-MCNC: 37 MG/DL (ref 5–21)
BUN/CREAT SERPL: 35.2 (ref 7–25)
CALCIUM SPEC-SCNC: 9.3 MG/DL (ref 8.4–10.4)
CEA SERPL-MCNC: 110 NG/ML (ref 0–5)
CHLORIDE SERPL-SCNC: 100 MMOL/L (ref 98–110)
CO2 SERPL-SCNC: 25 MMOL/L (ref 24–31)
CREAT BLD-MCNC: 1.05 MG/DL (ref 0.5–1.4)
DEPRECATED RDW RBC AUTO: 59.8 FL (ref 40–54)
EOSINOPHIL # BLD AUTO: 0.02 10*3/MM3 (ref 0–0.7)
EOSINOPHIL NFR BLD AUTO: 0.1 % (ref 0–4)
ERYTHROCYTE [DISTWIDTH] IN BLOOD BY AUTOMATED COUNT: 19.3 % (ref 12–15)
GFR SERPL CREATININE-BSD FRML MDRD: 54 ML/MIN/1.73
GLOBULIN UR ELPH-MCNC: 2.3 GM/DL
GLUCOSE BLD-MCNC: 97 MG/DL (ref 70–100)
HCT VFR BLD AUTO: 38.7 % (ref 37–47)
HGB BLD-MCNC: 12.9 G/DL (ref 12–16)
IMM GRANULOCYTES # BLD AUTO: 0.51 10*3/MM3 (ref 0–0.05)
IMM GRANULOCYTES NFR BLD AUTO: 3.1 % (ref 0–5)
LYMPHOCYTES # BLD AUTO: 0.36 10*3/MM3 (ref 0.72–4.86)
LYMPHOCYTES NFR BLD AUTO: 2.2 % (ref 15–45)
MCH RBC QN AUTO: 29.9 PG (ref 28–32)
MCHC RBC AUTO-ENTMCNC: 33.3 G/DL (ref 33–36)
MCV RBC AUTO: 89.6 FL (ref 82–98)
MONOCYTES # BLD AUTO: 1.4 10*3/MM3 (ref 0.19–1.3)
MONOCYTES NFR BLD AUTO: 8.6 % (ref 4–12)
NEUTROPHILS # BLD AUTO: 14.04 10*3/MM3 (ref 1.87–8.4)
NEUTROPHILS NFR BLD AUTO: 85.8 % (ref 39–78)
NRBC BLD AUTO-RTO: 0.6 /100 WBC (ref 0–0)
PLATELET # BLD AUTO: 210 10*3/MM3 (ref 130–400)
PMV BLD AUTO: 12.8 FL (ref 6–12)
POTASSIUM BLD-SCNC: 4.5 MMOL/L (ref 3.5–5.3)
PROT SERPL-MCNC: 5.7 G/DL (ref 6.3–8.7)
RBC # BLD AUTO: 4.32 10*6/MM3 (ref 4.2–5.4)
SODIUM BLD-SCNC: 139 MMOL/L (ref 135–145)
WBC NRBC COR # BLD: 16.37 10*3/MM3 (ref 4.8–10.8)

## 2019-03-07 PROCEDURE — 36415 COLL VENOUS BLD VENIPUNCTURE: CPT

## 2019-03-07 PROCEDURE — 85025 COMPLETE CBC W/AUTO DIFF WBC: CPT

## 2019-03-07 PROCEDURE — 82378 CARCINOEMBRYONIC ANTIGEN: CPT

## 2019-03-07 PROCEDURE — 80053 COMPREHEN METABOLIC PANEL: CPT

## 2019-03-08 ENCOUNTER — APPOINTMENT (OUTPATIENT)
Dept: GENERAL RADIOLOGY | Facility: HOSPITAL | Age: 59
End: 2019-03-08

## 2019-03-08 ENCOUNTER — HOSPITAL ENCOUNTER (OUTPATIENT)
Dept: MRI IMAGING | Facility: HOSPITAL | Age: 59
Discharge: HOME | End: 2019-03-08
Admitting: INTERNAL MEDICINE

## 2019-03-08 ENCOUNTER — HOSPITAL ENCOUNTER (INPATIENT)
Facility: HOSPITAL | Age: 59
LOS: 4 days | Discharge: HOSPICE | End: 2019-03-13
Attending: EMERGENCY MEDICINE | Admitting: FAMILY MEDICINE

## 2019-03-08 DIAGNOSIS — C34.12 PRIMARY MALIGNANT NEOPLASM OF BRONCHUS OF LEFT UPPER LOBE (HCC): ICD-10-CM

## 2019-03-08 DIAGNOSIS — Z74.09 IMPAIRED MOBILITY AND ADLS: ICD-10-CM

## 2019-03-08 DIAGNOSIS — I21.4 NSTEMI (NON-ST ELEVATED MYOCARDIAL INFARCTION) (HCC): ICD-10-CM

## 2019-03-08 DIAGNOSIS — R62.7 FAILURE TO THRIVE IN ADULT: Primary | ICD-10-CM

## 2019-03-08 DIAGNOSIS — Z78.9 IMPAIRED MOBILITY AND ADLS: ICD-10-CM

## 2019-03-08 DIAGNOSIS — Z74.09 IMPAIRED FUNCTIONAL MOBILITY AND ACTIVITY TOLERANCE: ICD-10-CM

## 2019-03-08 LAB
ALBUMIN SERPL-MCNC: 3.4 G/DL (ref 3.5–5)
ALBUMIN/GLOB SERPL: 1.4 G/DL (ref 1.1–2.5)
ALP SERPL-CCNC: 508 U/L (ref 24–120)
ALT SERPL W P-5'-P-CCNC: 236 U/L (ref 0–54)
ANION GAP SERPL CALCULATED.3IONS-SCNC: 15 MMOL/L (ref 4–13)
AST SERPL-CCNC: 308 U/L (ref 7–45)
BASOPHILS # BLD AUTO: 0.06 10*3/MM3 (ref 0–0.2)
BASOPHILS NFR BLD AUTO: 0.4 % (ref 0–2)
BILIRUB SERPL-MCNC: 6.2 MG/DL (ref 0.1–1)
BUN BLD-MCNC: 48 MG/DL (ref 5–21)
BUN/CREAT SERPL: 42.9 (ref 7–25)
CALCIUM SPEC-SCNC: 9.4 MG/DL (ref 8.4–10.4)
CHLORIDE SERPL-SCNC: 100 MMOL/L (ref 98–110)
CO2 SERPL-SCNC: 23 MMOL/L (ref 24–31)
CREAT BLD-MCNC: 1.12 MG/DL (ref 0.5–1.4)
DEPRECATED RDW RBC AUTO: 61.5 FL (ref 40–54)
EOSINOPHIL # BLD AUTO: 0 10*3/MM3 (ref 0–0.7)
EOSINOPHIL NFR BLD AUTO: 0 % (ref 0–4)
ERYTHROCYTE [DISTWIDTH] IN BLOOD BY AUTOMATED COUNT: 20.2 % (ref 12–15)
GFR SERPL CREATININE-BSD FRML MDRD: 50 ML/MIN/1.73
GLOBULIN UR ELPH-MCNC: 2.4 GM/DL
GLUCOSE BLD-MCNC: 118 MG/DL (ref 70–100)
HCT VFR BLD AUTO: 37.2 % (ref 37–47)
HGB BLD-MCNC: 12.5 G/DL (ref 12–16)
HOLD SPECIMEN: NORMAL
IMM GRANULOCYTES # BLD AUTO: 0.39 10*3/MM3 (ref 0–0.05)
IMM GRANULOCYTES NFR BLD AUTO: 2.4 % (ref 0–5)
LIPASE SERPL-CCNC: 215 U/L (ref 23–203)
LYMPHOCYTES # BLD AUTO: 0.34 10*3/MM3 (ref 0.72–4.86)
LYMPHOCYTES NFR BLD AUTO: 2.1 % (ref 15–45)
MCH RBC QN AUTO: 29.3 PG (ref 28–32)
MCHC RBC AUTO-ENTMCNC: 33.6 G/DL (ref 33–36)
MCV RBC AUTO: 87.3 FL (ref 82–98)
MONOCYTES # BLD AUTO: 1.43 10*3/MM3 (ref 0.19–1.3)
MONOCYTES NFR BLD AUTO: 8.7 % (ref 4–12)
NEUTROPHILS # BLD AUTO: 14.19 10*3/MM3 (ref 1.87–8.4)
NEUTROPHILS NFR BLD AUTO: 86.4 % (ref 39–78)
NRBC BLD AUTO-RTO: 0.6 /100 WBC (ref 0–0)
NT-PROBNP SERPL-MCNC: 3160 PG/ML (ref 0–900)
PLATELET # BLD AUTO: 212 10*3/MM3 (ref 130–400)
PMV BLD AUTO: 12.7 FL (ref 6–12)
POTASSIUM BLD-SCNC: 5 MMOL/L (ref 3.5–5.3)
PROT SERPL-MCNC: 5.8 G/DL (ref 6.3–8.7)
RBC # BLD AUTO: 4.26 10*6/MM3 (ref 4.2–5.4)
SODIUM BLD-SCNC: 138 MMOL/L (ref 135–145)
TROPONIN I SERPL-MCNC: 0.04 NG/ML (ref 0–0.03)
WBC NRBC COR # BLD: 16.41 10*3/MM3 (ref 4.8–10.8)
WHOLE BLOOD HOLD SPECIMEN: NORMAL
WHOLE BLOOD HOLD SPECIMEN: NORMAL

## 2019-03-08 PROCEDURE — 83690 ASSAY OF LIPASE: CPT | Performed by: EMERGENCY MEDICINE

## 2019-03-08 PROCEDURE — 70553 MRI BRAIN STEM W/O & W/DYE: CPT

## 2019-03-08 PROCEDURE — 94640 AIRWAY INHALATION TREATMENT: CPT

## 2019-03-08 PROCEDURE — 93005 ELECTROCARDIOGRAM TRACING: CPT | Performed by: EMERGENCY MEDICINE

## 2019-03-08 PROCEDURE — 99284 EMERGENCY DEPT VISIT MOD MDM: CPT

## 2019-03-08 PROCEDURE — A9577 INJ MULTIHANCE: HCPCS | Performed by: PSYCHIATRY & NEUROLOGY

## 2019-03-08 PROCEDURE — 71046 X-RAY EXAM CHEST 2 VIEWS: CPT

## 2019-03-08 PROCEDURE — 0 GADOBENATE DIMEGLUMINE 529 MG/ML SOLUTION: Performed by: PSYCHIATRY & NEUROLOGY

## 2019-03-08 PROCEDURE — 94799 UNLISTED PULMONARY SVC/PX: CPT

## 2019-03-08 PROCEDURE — 84484 ASSAY OF TROPONIN QUANT: CPT | Performed by: EMERGENCY MEDICINE

## 2019-03-08 PROCEDURE — 83880 ASSAY OF NATRIURETIC PEPTIDE: CPT | Performed by: EMERGENCY MEDICINE

## 2019-03-08 PROCEDURE — 85025 COMPLETE CBC W/AUTO DIFF WBC: CPT

## 2019-03-08 PROCEDURE — 80053 COMPREHEN METABOLIC PANEL: CPT | Performed by: EMERGENCY MEDICINE

## 2019-03-08 PROCEDURE — 93010 ELECTROCARDIOGRAM REPORT: CPT | Performed by: INTERNAL MEDICINE

## 2019-03-08 RX ORDER — IPRATROPIUM BROMIDE AND ALBUTEROL SULFATE 2.5; .5 MG/3ML; MG/3ML
3 SOLUTION RESPIRATORY (INHALATION) ONCE
Status: COMPLETED | OUTPATIENT
Start: 2019-03-08 | End: 2019-03-08

## 2019-03-08 RX ORDER — SODIUM CHLORIDE 0.9 % (FLUSH) 0.9 %
10 SYRINGE (ML) INJECTION AS NEEDED
Status: DISCONTINUED | OUTPATIENT
Start: 2019-03-08 | End: 2019-03-13 | Stop reason: HOSPADM

## 2019-03-08 RX ORDER — ALBUTEROL SULFATE 2.5 MG/3ML
2.5 SOLUTION RESPIRATORY (INHALATION)
Status: COMPLETED | OUTPATIENT
Start: 2019-03-08 | End: 2019-03-09

## 2019-03-08 RX ADMIN — GADOBENATE DIMEGLUMINE 20 ML: 529 INJECTION, SOLUTION INTRAVENOUS at 07:20

## 2019-03-08 RX ADMIN — IPRATROPIUM BROMIDE AND ALBUTEROL SULFATE 3 ML: 2.5; .5 SOLUTION RESPIRATORY (INHALATION) at 23:45

## 2019-03-08 RX ADMIN — ALBUTEROL SULFATE 2.5 MG: 2.5 SOLUTION RESPIRATORY (INHALATION) at 23:45

## 2019-03-09 PROBLEM — R62.7 FAILURE TO THRIVE IN ADULT: Status: ACTIVE | Noted: 2019-03-09

## 2019-03-09 LAB
ALBUMIN SERPL-MCNC: 3.2 G/DL (ref 3.5–5)
ALBUMIN/GLOB SERPL: 1.3 G/DL (ref 1.1–2.5)
ALP SERPL-CCNC: 465 U/L (ref 24–120)
ALT SERPL W P-5'-P-CCNC: 221 U/L (ref 0–54)
ANION GAP SERPL CALCULATED.3IONS-SCNC: 17 MMOL/L (ref 4–13)
ANION GAP SERPL CALCULATED.3IONS-SCNC: 17 MMOL/L (ref 4–13)
AST SERPL-CCNC: 282 U/L (ref 7–45)
BACTERIA UR QL AUTO: ABNORMAL /HPF
BASOPHILS # BLD AUTO: 0.03 10*3/MM3 (ref 0–0.2)
BASOPHILS NFR BLD AUTO: 0.2 % (ref 0–2)
BILIRUB SERPL-MCNC: 6.4 MG/DL (ref 0.1–1)
BILIRUB UR QL STRIP: ABNORMAL
BUN BLD-MCNC: 48 MG/DL (ref 5–21)
BUN BLD-MCNC: 49 MG/DL (ref 5–21)
BUN/CREAT SERPL: 39.7 (ref 7–25)
BUN/CREAT SERPL: 42.2 (ref 7–25)
CALCIUM SPEC-SCNC: 9.6 MG/DL (ref 8.4–10.4)
CALCIUM SPEC-SCNC: 9.6 MG/DL (ref 8.4–10.4)
CHLORIDE SERPL-SCNC: 98 MMOL/L (ref 98–110)
CHLORIDE SERPL-SCNC: 99 MMOL/L (ref 98–110)
CLARITY UR: ABNORMAL
CO2 SERPL-SCNC: 21 MMOL/L (ref 24–31)
CO2 SERPL-SCNC: 23 MMOL/L (ref 24–31)
COLOR UR: ABNORMAL
CREAT BLD-MCNC: 1.16 MG/DL (ref 0.5–1.4)
CREAT BLD-MCNC: 1.21 MG/DL (ref 0.5–1.4)
DEPRECATED RDW RBC AUTO: 62.4 FL (ref 40–54)
DEPRECATED RDW RBC AUTO: 63.9 FL (ref 40–54)
EOSINOPHIL # BLD AUTO: 0.01 10*3/MM3 (ref 0–0.7)
EOSINOPHIL NFR BLD AUTO: 0.1 % (ref 0–4)
ERYTHROCYTE [DISTWIDTH] IN BLOOD BY AUTOMATED COUNT: 20.3 % (ref 12–15)
ERYTHROCYTE [DISTWIDTH] IN BLOOD BY AUTOMATED COUNT: 20.6 % (ref 12–15)
GFR SERPL CREATININE-BSD FRML MDRD: 46 ML/MIN/1.73
GFR SERPL CREATININE-BSD FRML MDRD: 48 ML/MIN/1.73
GLOBULIN UR ELPH-MCNC: 2.5 GM/DL
GLUCOSE BLD-MCNC: 100 MG/DL (ref 70–100)
GLUCOSE BLD-MCNC: 100 MG/DL (ref 70–100)
GLUCOSE UR STRIP-MCNC: NEGATIVE MG/DL
HCT VFR BLD AUTO: 36.6 % (ref 37–47)
HCT VFR BLD AUTO: 37.2 % (ref 37–47)
HGB BLD-MCNC: 12.1 G/DL (ref 12–16)
HGB BLD-MCNC: 12.5 G/DL (ref 12–16)
HGB UR QL STRIP.AUTO: NEGATIVE
HOLD SPECIMEN: NORMAL
HYALINE CASTS UR QL AUTO: ABNORMAL /LPF
IMM GRANULOCYTES # BLD AUTO: 0.3 10*3/MM3 (ref 0–0.05)
IMM GRANULOCYTES NFR BLD AUTO: 1.9 % (ref 0–5)
INR PPP: 1.56 (ref 0.91–1.09)
KETONES UR QL STRIP: ABNORMAL
LEUKOCYTE ESTERASE UR QL STRIP.AUTO: ABNORMAL
LYMPHOCYTES # BLD AUTO: 0.34 10*3/MM3 (ref 0.72–4.86)
LYMPHOCYTES NFR BLD AUTO: 2.1 % (ref 15–45)
MCH RBC QN AUTO: 29.2 PG (ref 28–32)
MCH RBC QN AUTO: 30.2 PG (ref 28–32)
MCHC RBC AUTO-ENTMCNC: 33.1 G/DL (ref 33–36)
MCHC RBC AUTO-ENTMCNC: 33.6 G/DL (ref 33–36)
MCV RBC AUTO: 88.4 FL (ref 82–98)
MCV RBC AUTO: 89.9 FL (ref 82–98)
MONOCYTES # BLD AUTO: 1.51 10*3/MM3 (ref 0.19–1.3)
MONOCYTES NFR BLD AUTO: 9.5 % (ref 4–12)
NEUTROPHILS # BLD AUTO: 13.75 10*3/MM3 (ref 1.87–8.4)
NEUTROPHILS NFR BLD AUTO: 86.2 % (ref 39–78)
NITRITE UR QL STRIP: POSITIVE
NRBC BLD AUTO-RTO: 0.5 /100 WBC (ref 0–0)
PH UR STRIP.AUTO: 5.5 [PH] (ref 5–8)
PLATELET # BLD AUTO: 176 10*3/MM3 (ref 130–400)
PLATELET # BLD AUTO: 187 10*3/MM3 (ref 130–400)
PMV BLD AUTO: 12.7 FL (ref 6–12)
PMV BLD AUTO: 13.3 FL (ref 6–12)
POTASSIUM BLD-SCNC: 4.7 MMOL/L (ref 3.5–5.3)
POTASSIUM BLD-SCNC: 4.8 MMOL/L (ref 3.5–5.3)
PROT SERPL-MCNC: 5.7 G/DL (ref 6.3–8.7)
PROT UR QL STRIP: ABNORMAL
PROTHROMBIN TIME: 19.2 SECONDS (ref 11.9–14.6)
RBC # BLD AUTO: 4.14 10*6/MM3 (ref 4.2–5.4)
RBC # BLD AUTO: 4.14 10*6/MM3 (ref 4.2–5.4)
RBC # UR: ABNORMAL /HPF
REF LAB TEST METHOD: ABNORMAL
SODIUM BLD-SCNC: 137 MMOL/L (ref 135–145)
SODIUM BLD-SCNC: 138 MMOL/L (ref 135–145)
SP GR UR STRIP: 1.02 (ref 1–1.03)
SQUAMOUS #/AREA URNS HPF: ABNORMAL /HPF
UROBILINOGEN UR QL STRIP: ABNORMAL
WBC NRBC COR # BLD: 15.94 10*3/MM3 (ref 4.8–10.8)
WBC NRBC COR # BLD: 15.98 10*3/MM3 (ref 4.8–10.8)
WBC UR QL AUTO: ABNORMAL /HPF

## 2019-03-09 PROCEDURE — 85027 COMPLETE CBC AUTOMATED: CPT | Performed by: INTERNAL MEDICINE

## 2019-03-09 PROCEDURE — 87086 URINE CULTURE/COLONY COUNT: CPT | Performed by: EMERGENCY MEDICINE

## 2019-03-09 PROCEDURE — 94799 UNLISTED PULMONARY SVC/PX: CPT

## 2019-03-09 PROCEDURE — 25010000002 CEFTRIAXONE PER 250 MG: Performed by: EMERGENCY MEDICINE

## 2019-03-09 PROCEDURE — 85025 COMPLETE CBC W/AUTO DIFF WBC: CPT | Performed by: INTERNAL MEDICINE

## 2019-03-09 PROCEDURE — 87186 SC STD MICRODIL/AGAR DIL: CPT | Performed by: EMERGENCY MEDICINE

## 2019-03-09 PROCEDURE — 87088 URINE BACTERIA CULTURE: CPT | Performed by: EMERGENCY MEDICINE

## 2019-03-09 PROCEDURE — 80053 COMPREHEN METABOLIC PANEL: CPT | Performed by: INTERNAL MEDICINE

## 2019-03-09 PROCEDURE — 85610 PROTHROMBIN TIME: CPT | Performed by: INTERNAL MEDICINE

## 2019-03-09 PROCEDURE — 25010000002 FUROSEMIDE PER 20 MG: Performed by: INTERNAL MEDICINE

## 2019-03-09 PROCEDURE — 81001 URINALYSIS AUTO W/SCOPE: CPT | Performed by: EMERGENCY MEDICINE

## 2019-03-09 PROCEDURE — 25010000002 DEXAMETHASONE PER 1 MG: Performed by: INTERNAL MEDICINE

## 2019-03-09 PROCEDURE — 99223 1ST HOSP IP/OBS HIGH 75: CPT | Performed by: INTERNAL MEDICINE

## 2019-03-09 PROCEDURE — 25010000002 MORPHINE PER 10 MG: Performed by: INTERNAL MEDICINE

## 2019-03-09 RX ORDER — SODIUM CHLORIDE 0.9 % (FLUSH) 0.9 %
3 SYRINGE (ML) INJECTION EVERY 12 HOURS SCHEDULED
Status: DISCONTINUED | OUTPATIENT
Start: 2019-03-09 | End: 2019-03-13 | Stop reason: HOSPADM

## 2019-03-09 RX ORDER — FUROSEMIDE 10 MG/ML
40 INJECTION INTRAMUSCULAR; INTRAVENOUS DAILY
Status: COMPLETED | OUTPATIENT
Start: 2019-03-09 | End: 2019-03-10

## 2019-03-09 RX ORDER — FENTANYL 25 UG/H
1 PATCH TRANSDERMAL
Status: DISCONTINUED | OUTPATIENT
Start: 2019-03-09 | End: 2019-03-13 | Stop reason: HOSPADM

## 2019-03-09 RX ORDER — DEXAMETHASONE SODIUM PHOSPHATE 4 MG/ML
8 INJECTION, SOLUTION INTRA-ARTICULAR; INTRALESIONAL; INTRAMUSCULAR; INTRAVENOUS; SOFT TISSUE EVERY 12 HOURS
Status: DISCONTINUED | OUTPATIENT
Start: 2019-03-10 | End: 2019-03-13 | Stop reason: HOSPADM

## 2019-03-09 RX ORDER — SODIUM CHLORIDE 9 MG/ML
100 INJECTION, SOLUTION INTRAVENOUS CONTINUOUS
Status: DISCONTINUED | OUTPATIENT
Start: 2019-03-09 | End: 2019-03-09

## 2019-03-09 RX ORDER — SODIUM CHLORIDE 0.9 % (FLUSH) 0.9 %
3-10 SYRINGE (ML) INJECTION AS NEEDED
Status: DISCONTINUED | OUTPATIENT
Start: 2019-03-09 | End: 2019-03-13 | Stop reason: HOSPADM

## 2019-03-09 RX ORDER — HYDROCODONE BITARTRATE AND ACETAMINOPHEN 5; 325 MG/1; MG/1
1 TABLET ORAL EVERY 4 HOURS PRN
Status: DISCONTINUED | OUTPATIENT
Start: 2019-03-09 | End: 2019-03-09

## 2019-03-09 RX ADMIN — SODIUM CHLORIDE, PRESERVATIVE FREE 10 ML: 5 INJECTION INTRAVENOUS at 10:49

## 2019-03-09 RX ADMIN — FUROSEMIDE 40 MG: 10 INJECTION, SOLUTION INTRAVENOUS at 10:48

## 2019-03-09 RX ADMIN — FENTANYL 1 PATCH: 25 PATCH, EXTENDED RELEASE TRANSDERMAL at 10:48

## 2019-03-09 RX ADMIN — CEFTRIAXONE SODIUM 1 G: 1 INJECTION, POWDER, FOR SOLUTION INTRAMUSCULAR; INTRAVENOUS at 02:53

## 2019-03-09 RX ADMIN — ALBUTEROL SULFATE 2.5 MG: 2.5 SOLUTION RESPIRATORY (INHALATION) at 00:12

## 2019-03-09 RX ADMIN — MORPHINE SULFATE 4 MG: 4 INJECTION, SOLUTION INTRAMUSCULAR; INTRAVENOUS at 17:02

## 2019-03-09 RX ADMIN — MORPHINE SULFATE 4 MG: 4 INJECTION, SOLUTION INTRAMUSCULAR; INTRAVENOUS at 12:44

## 2019-03-09 RX ADMIN — SODIUM CHLORIDE, PRESERVATIVE FREE 3 ML: 5 INJECTION INTRAVENOUS at 20:26

## 2019-03-09 RX ADMIN — SODIUM CHLORIDE, PRESERVATIVE FREE 10 ML: 5 INJECTION INTRAVENOUS at 12:45

## 2019-03-09 RX ADMIN — SODIUM CHLORIDE 100 ML/HR: 9 INJECTION, SOLUTION INTRAVENOUS at 05:36

## 2019-03-09 RX ADMIN — DEXAMETHASONE SODIUM PHOSPHATE 12 MG: 4 INJECTION, SOLUTION INTRA-ARTICULAR; INTRALESIONAL; INTRAMUSCULAR; INTRAVENOUS; SOFT TISSUE at 10:49

## 2019-03-09 RX ADMIN — SODIUM CHLORIDE, PRESERVATIVE FREE 3 ML: 5 INJECTION INTRAVENOUS at 08:44

## 2019-03-09 RX ADMIN — HYDROCODONE BITARTRATE AND ACETAMINOPHEN 1 TABLET: 5; 325 TABLET ORAL at 08:59

## 2019-03-09 RX ADMIN — MORPHINE SULFATE 4 MG: 4 INJECTION, SOLUTION INTRAMUSCULAR; INTRAVENOUS at 02:50

## 2019-03-09 RX ADMIN — ALBUTEROL SULFATE 2.5 MG: 2.5 SOLUTION RESPIRATORY (INHALATION) at 00:13

## 2019-03-09 NOTE — ED PROVIDER NOTES
Subjective   58-year-old woman presenting to the emergency department with weakness decreased appetite difficulty tolerating solid foods.  Patient states that she was recently diagnosed with lung cancer and is going to start radiation treatment on Monday.  Patient denies having any fevers but states she has had worsening shortness of breath and some chest pain abdominal pain.  Patient denies any urinary symptoms.            Review of Systems   Respiratory: Positive for shortness of breath.    Cardiovascular: Positive for chest pain.   Gastrointestinal: Positive for nausea and vomiting.   All other systems reviewed and are negative.      Past Medical History:   Diagnosis Date   • Adenocarcinoma of lung (CMS/HCC) 2014   • Anemia     d/t chemotherapy   • Anxiety    • Atrial fibrillation (CMS/HCC)    • Chronic pain    • COPD (chronic obstructive pulmonary disease) (CMS/HCC)    • Coronary artery disease    • Depression    • DVT (deep venous thrombosis) (CMS/HCC)     left leg, in past   • Endobronchial mass 2/18/2019   • Generalized headaches    • High serum carcinoembryonic antigen (CEA)    • History of radiation therapy     6300 cGy to lung completed 8-6-2014; prophylactic brain radiation 2600 cGy completed 4-   • Hx of degenerative disc disease    • Hyperglycemia    • Hyperlipidemia    • Hypertension    • Meningitis    • Morbid obesity (CMS/HCC)    • Nephrolithiasis    • Non-small cell cancer of left lung (CMS/HCC) 2/25/2019   • Osteoarthritis    • Oxygen desaturation during sleep     pt states oxygen company came and got oxygen ,did not qualify for usage   • Personal history of chemotherapy     For Lung Cancer   • Recurrent pleural effusion on left 2/18/2019       Allergies   Allergen Reactions   • Codeine Itching   • Penicillins Hives   • Ace Inhibitors Cough       Past Surgical History:   Procedure Laterality Date   • BRONCHOSCOPY     • CARDIOVERSION      2014   • COLONOSCOPY     • KNEE SURGERY Left     x 3    • PARTIAL HYSTERECTOMY  2003   • PORTACATH PLACEMENT Left    • SKIN LESION EXCISION      Negative for malignacy   • TONSILLECTOMY     • URETEROSCOPY LASER LITHOTRIPSY WITH STENT INSERTION Right 2018    Procedure: CYSTO, URETEROSCOPY LASER LITHOTRIPSY WITH STENT INSERTION, RETROGRADE PYELOGRAM,;  Surgeon: Grzegorz Landaverde MD;  Location: [facility] OR;  Service: Urology       Family History   Problem Relation Age of Onset   • Heart failure Mother    • Prostate cancer Father    • No Known Problems Sister    • Cancer Brother         skin cancer   • Lung cancer Sister    • Cancer Sister         skin cancer       Social History     Socioeconomic History   • Marital status:      Spouse name: Not on file   • Number of children: 0   • Years of education: Not on file   • Highest education level: Not on file   Tobacco Use   • Smoking status: Former Smoker     Packs/day: 1.50     Years: 40.00     Pack years: 60.00     Types: Cigarettes     Last attempt to quit: 2013     Years since quittin.3   • Smokeless tobacco: Never Used   Substance and Sexual Activity   • Alcohol use: No     Comment: social   • Drug use: No   • Sexual activity: Defer           Objective   Physical Exam   Constitutional: She is oriented to person, place, and time. She appears well-developed and well-nourished.   HENT:   Head: Normocephalic and atraumatic.   Eyes: EOM are normal. Pupils are equal, round, and reactive to light.   Neck: Normal range of motion. Neck supple.   Cardiovascular: Normal rate, regular rhythm and normal heart sounds.   2+ pulses in upper and lower extremities   Pulmonary/Chest: Effort normal. She has rales.   Abdominal: Soft. Bowel sounds are normal.   Musculoskeletal: Normal range of motion.   Neurological: She is alert and oriented to person, place, and time.   Skin: Skin is warm. Capillary refill takes less than 2 seconds.   Psychiatric: She has a normal mood and affect. Her behavior is normal. Thought  content normal.   Nursing note and vitals reviewed.      Procedures           ED Course  ED Course as of Mar 28 1114   Sat Mar 09, 2019   0201 Patient will be admitted to hospitalist service for lab abnormalities and failure to thrive.  Low suspicion for sepsis likely type II NSTEMI.  Urine contaminated low suspicion for sepsis.  [AP]      ED Course User Index  [AP] Daniela Enamorado MD                  [facility]      Final diagnoses:   Failure to thrive in adult   NSTEMI (non-ST elevated myocardial infarction) (CMS/[facility])            Daniela Enamorado MD  03/28/19 1114

## 2019-03-09 NOTE — CONSULTS
Subjective     PROBLEM LIST:  1.  Non-small cell lung cancer, second primary tumor stage IV  A.  Right upper lobe mass  B.  Liver and bone metastasis on PET scan with elevated bilirubin  C.  Large left pleural effusion, with recent chest tube drainage  2.  History of small cell lung cancer in 2014 treated with chemoradiation including PCI    CHIEF COMPLAINT: Dyspnea and weakness      HISTORY OF PRESENT ILLNESS:  The patient is a 58 y.o. year old female, referred for evaluation and management of her dyspnea, weakness, abdominal pain, nausea, and anorexia related to her recent diagnosis of metastatic non-small cell lung cancer.  She is followed by Dr. Joey Erickson who treated her previously for limited stage small cell lung cancer.  She has recently been found to have the second primary cancer with a different cell type with extensive involvement including mediastinal lymphadenopathy, liver metastasis and bone metastasis.  She is admitted from the emergency department with vomiting and nausea, dyspnea and inability to care for herself at home.  She describes aching upper abdominal pain with associated right upper quadrant tenderness.  She has dyspnea with any exertion.  She's had some improvement in this while wearing oxygen.  She was planned for possible outpatient radiation next week.    REVIEW OF SYSTEMS:  A 14 point review of systems was performed and is negative except as noted above.    Past Medical History:   Diagnosis Date   • Adenocarcinoma of lung (CMS/HCC) 2014   • Anemia     d/t chemotherapy   • Anxiety    • Atrial fibrillation (CMS/HCC)    • Chronic pain    • COPD (chronic obstructive pulmonary disease) (CMS/HCC)    • Coronary artery disease    • Depression    • DVT (deep venous thrombosis) (CMS/HCC)     left leg, in past   • Endobronchial mass 2/18/2019   • Generalized headaches    • High serum carcinoembryonic antigen (CEA)    • History of radiation therapy     6300 cGy to lung completed 8-6-2014;  prophylactic brain radiation 2600 cGy completed 4-   • Hx of degenerative disc disease    • Hyperglycemia    • Hyperlipidemia    • Hypertension    • Meningitis    • Morbid obesity (CMS/HCC)    • Nephrolithiasis    • Non-small cell cancer of left lung (CMS/HCC) 2/25/2019   • Osteoarthritis    • Oxygen desaturation during sleep     pt states oxygen company came and got oxygen ,did not qualify for usage   • Personal history of chemotherapy     For Lung Cancer   • Recurrent pleural effusion on left 2/18/2019           No current facility-administered medications on file prior to encounter.      Current Outpatient Medications on File Prior to Encounter   Medication Sig Dispense Refill   • albuterol (PROVENTIL HFA;VENTOLIN HFA) 108 (90 BASE) MCG/ACT inhaler Inhale 2 puffs Every 4 (Four) Hours As Needed for wheezing.     • albuterol (PROVENTIL) (2.5 MG/3ML) 0.083% nebulizer solution Take 2.5 mg by nebulization Every 4 (Four) Hours As Needed for Wheezing for up to 30 days. 360 mL 5   • B Complex Vitamins (VITAMIN B COMPLEX PO) Take  by mouth.     • celecoxib (CeleBREX) 200 MG capsule Take 200 mg by mouth Daily.     • diclofenac (VOLTAREN) 1 % gel gel Apply 4 g topically 4 (Four) Times a Day As Needed.     • HYDROcodone-acetaminophen (NORCO)  MG per tablet Take 1 tablet by mouth Every 6 (Six) Hours As Needed for moderate pain (4-6).     • LORazepam (ATIVAN) 1 MG tablet Take 1 mg by mouth Every 8 (Eight) Hours As Needed for anxiety.     • losartan (COZAAR) 50 MG tablet Take 100 mg by mouth Daily.     • polyethylene glycol (MIRALAX) packet Take 17 g by mouth Daily.     • promethazine (PHENERGAN) 25 MG tablet      • raNITIdine (ZANTAC) 150 MG tablet Take 150 mg by mouth 2 (Two) Times a Day.     • Sennosides 8.6 MG capsule Take  by mouth As Needed.     • sertraline (ZOLOFT) 100 MG tablet Take 100 mg by mouth Daily.     • sotalol (BETAPACE) 160 MG tablet Take 160 mg by mouth 2 (Two) Times a Day.     • temazepam  "(RESTORIL) 30 MG capsule Take 30 mg by mouth At Night As Needed for sleep.     • tiZANidine (ZANAFLEX) 4 MG tablet Take 4 mg by mouth At Night As Needed for muscle spasms.         Allergies   Allergen Reactions   • Codeine Itching   • Penicillins Hives   • Ace Inhibitors Cough       Past Surgical History:   Procedure Laterality Date   • BRONCHOSCOPY     • CARDIOVERSION         • COLONOSCOPY     • KNEE SURGERY Left     x 3   • PARTIAL HYSTERECTOMY  2003   • PORTACATH PLACEMENT Left    • SKIN LESION EXCISION      Negative for malignacy   • TONSILLECTOMY     • URETEROSCOPY LASER LITHOTRIPSY WITH STENT INSERTION Right 2018    Procedure: CYSTO, URETEROSCOPY LASER LITHOTRIPSY WITH STENT INSERTION, RETROGRADE PYELOGRAM,;  Surgeon: Grzegorz Landaverde MD;  Location: [facility] OR;  Service: Urology       Social History     Socioeconomic History   • Marital status:      Spouse name: Not on file   • Number of children: 0   • Years of education: Not on file   • Highest education level: Not on file   Tobacco Use   • Smoking status: Former Smoker     Packs/day: 1.50     Years: 40.00     Pack years: 60.00     Types: Cigarettes     Last attempt to quit: 2013     Years since quittin.3   • Smokeless tobacco: Never Used   Substance and Sexual Activity   • Alcohol use: No     Comment: social   • Drug use: No   • Sexual activity: Defer   She lives alone.  She has friends that can help her.    Family History   Problem Relation Age of Onset   • Heart failure Mother    • Prostate cancer Father    • No Known Problems Sister    • Cancer Brother         skin cancer   • Lung cancer Sister    • Cancer Sister         skin cancer       Objective     /79 (BP Location: Right arm, Patient Position: Lying)   Pulse 79   Temp 97.3 °F (36.3 °C) (Oral)   Resp 16   Ht 177.8 cm (70\")   Wt 111 kg (244 lb 6.4 oz)   LMP  (LMP Unknown)   SpO2 97%   BMI 35.07 kg/m²   Performance Status: ECOG 4  General: Ill- appearing " female in no acute distress, wearing oxygen and lying in a hospital bed  Neuro: alert and oriented but slow to answer questions but accurate with answers.  She is moving all extremities normally.  HEENT: sclera anicteric, extraocular movements intact, oropharynx without candida or ulcers  Lymphatics: no cervical, supraclavicular, or axillary adenopathy  Cardiovascular: regular rate and rhythm, no murmurs  Lungs: Decreased breath sounds on the left with no rub, wheezes, or crackles  Abdomen: soft, mildly tender in the right upper quadrant with a probable liver edge palpated  Extremeties: no lower extremity edema, no cords or calf tenderness  Skin: no rashes, lesions, bruising, or petechiae  Psych: mood and affect appropriate    Labs: The MRI brain report and images were reviewed with no findings of metastatic disease.  The small vessel changes noted are likely from her prior radiation.  Reports from the PET done previously show multiple sites of metastasis as outlined above.    Bilirubin is markedly elevated at 6.4 with elevated AST and ALT         Assessment/Plan     Cary Ferris is a 58 y.o. year old female with widely metastatic non-small cell lung cancer which has developed as a second primary after previously treated small cell lung cancer.  Unfortunately, her performance status has declined rapidly.  She has symptoms now as outlined above.    PLAN: 1.  I reviewed in detail with Ms. Ferris the findings from her imaging studies and labs showing widely metastatic lung cancer.  We discussed the lack of any curative therapy.  We also discussed the possibility of rapid decline despite attempts at supportive care.  We then discussed measures to try and improve her symptom management.  I'll add some dexamethasone to see if it helps with the pain that I think is partly from her liver metastasis.  Hopefully this will help with her nausea and some also.  She'll continue with supplemental oxygen.  She is on IV  fluids with IV antiemetics and she has analgesics ordered.  A palliative care consult has been ordered which is certainly very appropriate.      I will see her again tomorrow we can discuss further about any other plans.           No name on file.    3/9/2019

## 2019-03-09 NOTE — PROGRESS NOTES
[facility] ER Medicine Services  HISTORY AND PHYSICAL    Date of Admission: 3/8/2019  Primary Care Physician: Aide Garces DO    Subjective     Chief Complaint: Follow-up    History of Present Illness  She is a 58-year-old woman who has known prior history of adenocarcinoma of lung. She is followed by Dr. Erickson.  She presented with decreased appetite, generalized weakness, shortness of breath.  She was found with complete opacification of the left hemithorax.  Per discussion with covering hematologist oncologist for Dr. Erickson,  she is found to have different cancer from previous cancer type she had in the past.  We were in her room while he disclose her poor prognosis and goal of symptom control.  Radiooncologist referral deferred to Oncologist.    She claims to have discomfort on her lower abdominal area and on RUQ.  She has known metastasis to her liver and has an elevated transaminase.        Review of Systems     Otherwise complete ROS reviewed and negative except as mentioned in the HPI.    Past Medical History:   Past Medical History:   Diagnosis Date   • Adenocarcinoma of lung (CMS/HCC) 2014   • Anemia     d/t chemotherapy   • Anxiety    • Atrial fibrillation (CMS/HCC)    • Chronic pain    • COPD (chronic obstructive pulmonary disease) (CMS/HCC)    • Coronary artery disease    • Depression    • DVT (deep venous thrombosis) (CMS/HCC)     left leg, in past   • Endobronchial mass 2/18/2019   • Generalized headaches    • High serum carcinoembryonic antigen (CEA)    • History of radiation therapy     6300 cGy to lung completed 8-6-2014; prophylactic brain radiation 2600 cGy completed 4-   • Hx of degenerative disc disease    • Hyperglycemia    • Hyperlipidemia    • Hypertension    • Meningitis    • Morbid obesity (CMS/HCC)    • Nephrolithiasis    • Non-small cell cancer of left lung (CMS/HCC) 2/25/2019   • Osteoarthritis    • Oxygen desaturation during  sleep     pt states oxygen company came and got oxygen ,did not qualify for usage   • Personal history of chemotherapy     For Lung Cancer   • Recurrent pleural effusion on left 2/18/2019     Past Surgical History:  Past Surgical History:   Procedure Laterality Date   • BRONCHOSCOPY     • CARDIOVERSION      2014   • COLONOSCOPY     • KNEE SURGERY Left     x 3   • PARTIAL HYSTERECTOMY  12/2003   • PORTACATH PLACEMENT Left    • SKIN LESION EXCISION      Negative for malignacy   • TONSILLECTOMY     • URETEROSCOPY LASER LITHOTRIPSY WITH STENT INSERTION Right 5/21/2018    Procedure: CYSTO, URETEROSCOPY LASER LITHOTRIPSY WITH STENT INSERTION, RETROGRADE PYELOGRAM,;  Surgeon: Grzegorz Landaverde MD;  Location: [facility];  Service: Urology     Social History:  reports that she quit smoking about 5 years ago. Her smoking use included cigarettes. She has a 60.00 pack-year smoking history. she has never used smokeless tobacco. She reports that she does not drink alcohol or use drugs.    Family History: family history includes Cancer in her brother and sister; Heart failure in her mother; Lung cancer in her sister; No Known Problems in her sister; Prostate cancer in her father.     Allergies:  Allergies   Allergen Reactions   • Codeine Itching   • Penicillins Hives   • Ace Inhibitors Cough     Medications:  Prior to Admission medications    Medication Sig Start Date End Date Taking? Authorizing Provider   albuterol (PROVENTIL HFA;VENTOLIN HFA) 108 (90 BASE) MCG/ACT inhaler Inhale 2 puffs Every 4 (Four) Hours As Needed for wheezing.    ProviderMargi MD   albuterol (PROVENTIL) (2.5 MG/3ML) 0.083% nebulizer solution Take 2.5 mg by nebulization Every 4 (Four) Hours As Needed for Wheezing for up to 30 days. 2/26/19 3/28/19  Anastasia Varner APRN   B Complex Vitamins (VITAMIN B COMPLEX PO) Take  by mouth.    ProviderMargi MD   celecoxib (CeleBREX) 200 MG capsule Take 200 mg by mouth Daily.    ProviderMargi  "MD   diclofenac (VOLTAREN) 1 % gel gel Apply 4 g topically 4 (Four) Times a Day As Needed.    Margi Reyes MD   HYDROcodone-acetaminophen (NORCO)  MG per tablet Take 1 tablet by mouth Every 6 (Six) Hours As Needed for moderate pain (4-6).    Margi Reyes MD   LORazepam (ATIVAN) 1 MG tablet Take 1 mg by mouth Every 8 (Eight) Hours As Needed for anxiety.    Margi Reyes MD   losartan (COZAAR) 50 MG tablet Take 100 mg by mouth Daily.    Margi Reyes MD   polyethylene glycol (MIRALAX) packet Take 17 g by mouth Daily.    Margi Reyes MD   promethazine (PHENERGAN) 25 MG tablet  11/29/18   Margi Reyes MD   raNITIdine (ZANTAC) 150 MG tablet Take 150 mg by mouth 2 (Two) Times a Day.    Margi Reyes MD   Sennosides 8.6 MG capsule Take  by mouth As Needed.    Margi Reyes MD   sertraline (ZOLOFT) 100 MG tablet Take 100 mg by mouth Daily.    Margi Reyes MD   sotalol (BETAPACE) 160 MG tablet Take 160 mg by mouth 2 (Two) Times a Day.    Margi Reyes MD   temazepam (RESTORIL) 30 MG capsule Take 30 mg by mouth At Night As Needed for sleep.    Margi Reyes MD   tiZANidine (ZANAFLEX) 4 MG tablet Take 4 mg by mouth At Night As Needed for muscle spasms.    Margi Reyes MD     Objective     Vital Signs: /98 (BP Location: Right arm, Patient Position: Lying) Comment: nurse notified  Pulse 82   Temp 97.9 °F (36.6 °C) (Oral)   Resp 18   Ht 177.8 cm (70\")   Wt 111 kg (244 lb 6.4 oz)   LMP  (LMP Unknown)   SpO2 95%   BMI 35.07 kg/m²   Physical Exam  Constitutional: She is oriented to person, place, and time. No distress. pale  HENT:   Head: Normocephalic and atraumatic.   Eyes: Conjunctivae are normal. No scleral icterus.   Neck: Neck supple. No JVD present.   Cardiovascular: Normal rate and regular rhythm.   Murmur heard. - not readily heard by me  Pulmonary/Chest: She is in no  respiratory distress.  Calm, no acc " muscle use. She has no wheezes. She has no rales. significnatly diminished breath sounds on the left lung field  Decreased on left side; tachypnea   Abdominal: Soft. Bowel sounds are normal. She exhibits no distension and no mass. There is no tenderness. There is no guarding.   Musculoskeletal: She exhibits no edema (1-2+ bilateral lower extremity ).   Neurological: She is alert and oriented to person, place, and time.   Skin: Skin is warm. No erythema. No pallor.   Psychiatric: She has a normal mood and affect. Her behavior is normal.   Nursing note and vitals reviewed.          Results Reviewed:  Lab Results (last 24 hours)     Procedure Component Value Units Date/Time    Comprehensive Metabolic Panel [[id number]]  (Abnormal) Collected:  03/09/19 0521    Specimen:  Blood Updated:  03/09/19 0616     Glucose 100 mg/dL      BUN 48 mg/dL      Creatinine 1.21 mg/dL      Sodium 138 mmol/L      Potassium 4.7 mmol/L      Chloride 98 mmol/L      CO2 23.0 mmol/L      Calcium 9.6 mg/dL      Total Protein 5.7 g/dL      Albumin 3.20 g/dL      ALT (SGPT) 221 U/L      AST (SGOT) 282 U/L      Alkaline Phosphatase 465 U/L      Total Bilirubin 6.4 mg/dL      eGFR Non African Amer 46 mL/min/1.73      Globulin 2.5 gm/dL      A/G Ratio 1.3 g/dL      BUN/Creatinine Ratio 39.7     Anion Gap 17.0 mmol/L     Protime-INR [[id number]]  (Abnormal) Collected:  03/09/19 0521    Specimen:  Blood Updated:  03/09/19 0613     Protime 19.2 Seconds      INR 1.56    CBC (No Diff) [[id number]]  (Abnormal) Collected:  03/09/19 0521    Specimen:  Blood Updated:  03/09/19 0606     WBC 15.98 10*3/mm3      RBC 4.14 10*6/mm3      Hemoglobin 12.1 g/dL      Hematocrit 36.6 %      MCV 88.4 fL      MCH 29.2 pg      MCHC 33.1 g/dL      RDW 20.3 %      RDW-SD 62.4 fl      MPV 13.3 fL      Platelets 176 10*3/mm3     Basic Metabolic Panel [[id number]]  (Abnormal) Collected:  03/09/19 0336    Specimen:  Blood Updated:  03/09/19 0409     Glucose 100 mg/dL      BUN 49  mg/dL      Creatinine 1.16 mg/dL      Sodium 137 mmol/L      Potassium 4.8 mmol/L      Chloride 99 mmol/L      CO2 21.0 mmol/L      Calcium 9.6 mg/dL      eGFR Non African Amer 48 mL/min/1.73      BUN/Creatinine Ratio 42.2     Anion Gap 17.0 mmol/L     Narrative:       GFR Normal >60  Chronic Kidney Disease <60  Kidney Failure <15    CBC Auto Differential [[id number]]  (Abnormal) Collected:  03/09/19 0336    Specimen:  Blood Updated:  03/09/19 0405     WBC 15.94 10*3/mm3      RBC 4.14 10*6/mm3      Hemoglobin 12.5 g/dL      Hematocrit 37.2 %      MCV 89.9 fL      MCH 30.2 pg      MCHC 33.6 g/dL      RDW 20.6 %      RDW-SD 63.9 fl      MPV 12.7 fL      Platelets 187 10*3/mm3      Neutrophil % 86.2 %      Lymphocyte % 2.1 %      Monocyte % 9.5 %      Eosinophil % 0.1 %      Basophil % 0.2 %      Immature Grans % 1.9 %      Neutrophils, Absolute 13.75 10*3/mm3      Lymphocytes, Absolute 0.34 10*3/mm3      Monocytes, Absolute 1.51 10*3/mm3      Eosinophils, Absolute 0.01 10*3/mm3      Basophils, Absolute 0.03 10*3/mm3      Immature Grans, Absolute 0.30 10*3/mm3      nRBC 0.5 /100 WBC     Urinalysis, Microscopic Only - Urine, Clean Catch [[id number]]  (Abnormal) Collected:  03/09/19 0010    Specimen:  Urine, Clean Catch Updated:  03/09/19 0038     RBC, UA 0-2 /HPF      WBC, UA 21-30 /HPF      Bacteria, UA 2+ /HPF      Squamous Epithelial Cells, UA 3-6 /HPF      Hyaline Casts, UA 0-2 /LPF      Methodology Manual Light Microscopy    Urinalysis With Culture If Indicated - Urine, Clean Catch [[id number]]  (Abnormal) Collected:  03/09/19 0010    Specimen:  Urine, Clean Catch Updated:  03/09/19 0038     Color, UA Dark Yellow     Appearance, UA Cloudy     pH, UA 5.5     Specific Gravity, UA 1.024     Glucose, UA Negative     Ketones, UA Trace     Bilirubin, UA Large (3+)     Blood, UA Negative     Protein, UA 30 mg/dL (1+)     Leuk Esterase, UA Large (3+)     Nitrite, UA Positive     Urobilinogen, UA 2.0 E.U./dL    Urine Culture -  Urine, Urine, Clean Catch [[id number]] Collected:  03/09/19 0010    Specimen:  Urine, Clean Catch Updated:  03/09/19 0038    [place] Draw [[id number]] Collected:  03/08/19 2105    Specimen:  Blood Updated:  03/09/19 0000    Narrative:       The following orders were created for panel order [place] Draw.  Procedure                               Abnormality         Status                     ---------                               -----------         ------                     Light Blue Top[[id number]]                                   Final result               Green Top (Gel)[[id number]]                                  Final result               Lavender Top[[id number]]                                     Final result               Red Top[[id number]]                                          Final result                 Please view results for these tests on the individual orders.    Green Top (Gel) [[id number]] Collected:  03/08/19 2247    Specimen:  Blood Updated:  03/09/19 0000     Extra Tube Hold for add-ons.     Comment: Auto resulted.       BNP [[id number]]  (Abnormal) Collected:  03/08/19 2246    Specimen:  Blood Updated:  03/08/19 2315     proBNP 3,160.0 pg/mL     Troponin [[id number]]  (Abnormal) Collected:  03/08/19 2246    Specimen:  Blood Updated:  03/08/19 2315     Troponin I 0.045 ng/mL     Lipase [[id number]]  (Abnormal) Collected:  03/08/19 2246    Specimen:  Blood Updated:  03/08/19 2303     Lipase 215 U/L     Comprehensive Metabolic Panel [[id number]]  (Abnormal) Collected:  03/08/19 2246    Specimen:  Blood Updated:  03/08/19 2303     Glucose 118 mg/dL      BUN 48 mg/dL      Creatinine 1.12 mg/dL      Sodium 138 mmol/L      Potassium 5.0 mmol/L      Chloride 100 mmol/L      CO2 23.0 mmol/L      Calcium 9.4 mg/dL      Total Protein 5.8 g/dL      Albumin 3.40 g/dL      ALT (SGPT) 236 U/L      AST (SGOT) 308 U/L      Alkaline Phosphatase 508 U/L      Total Bilirubin 6.2 mg/dL      eGFR Non African Amer 50  mL/min/1.73      Globulin 2.4 gm/dL      A/G Ratio 1.4 g/dL      BUN/Creatinine Ratio 42.9     Anion Gap 15.0 mmol/L     Light Blue Top [[id number]] Collected:  03/08/19 2105    Specimen:  Blood from Hand, Right Updated:  03/08/19 2215     Extra Tube hold for add-on     Comment: Auto resulted       Red Top [[id number]] Collected:  03/08/19 2105    Specimen:  Blood Updated:  03/08/19 2215     Extra Tube Hold for add-ons.     Comment: Auto resulted.       Lavender Top [[id number]] Collected:  03/08/19 2105    Specimen:  Blood Updated:  03/08/19 2215     Extra Tube hold for add-on     Comment: Auto resulted       CBC & Differential [[id number]] Collected:  03/08/19 2105    Specimen:  Blood Updated:  03/08/19 2146    Narrative:       The following orders were created for panel order CBC & Differential.  Procedure                               Abnormality         Status                     ---------                               -----------         ------                     CBC Auto Differential[[id number]]        Abnormal            Final result                 Please view results for these tests on the individual orders.    CBC Auto Differential [[id number]]  (Abnormal) Collected:  03/08/19 2105    Specimen:  Blood Updated:  03/08/19 2146     WBC 16.41 10*3/mm3      RBC 4.26 10*6/mm3      Hemoglobin 12.5 g/dL      Hematocrit 37.2 %      MCV 87.3 fL      MCH 29.3 pg      MCHC 33.6 g/dL      RDW 20.2 %      RDW-SD 61.5 fl      MPV 12.7 fL      Platelets 212 10*3/mm3      Neutrophil % 86.4 %      Lymphocyte % 2.1 %      Monocyte % 8.7 %      Eosinophil % 0.0 %      Basophil % 0.4 %      Immature Grans % 2.4 %      Neutrophils, Absolute 14.19 10*3/mm3      Lymphocytes, Absolute 0.34 10*3/mm3      Monocytes, Absolute 1.43 10*3/mm3      Eosinophils, Absolute 0.00 10*3/mm3      Basophils, Absolute 0.06 10*3/mm3      Immature Grans, Absolute 0.39 10*3/mm3      nRBC 0.6 /100 WBC         Imaging Results (last 24 hours)     Procedure  Component Value Units Date/Time    XR Chest 2 View [[id number]] Collected:  03/08/19 2250     Updated:  03/08/19 2254    Narrative:       EXAMINATION: XR CHEST 2 VW-  3/8/2019 10:50 PM CST     TWO-VIEW CHEST:      HISTORY: Shortness of air      Frontal and lateral projection chest radiograph obtained.     COMPARISON:  2/12/2019      FINDINGS:     Left hemithorax opacification again noted.     The right lung is grossly clear.     Infusion catheter again identified.         The bony structures are intact.                                                                                                                  Impression:       1. Persistent left hemithorax opacification.  2. Right lung is clear.        This report was finalized on 03/08/2019 22:51 by Dr. Blas Mahajan MD.          Result Impression   1. Intrathoracic malignancy with abnormal FDG uptake seen throughout  the compressed left lung parenchyma as well as the left pleural space.  There are hypermetabolic metastatic lymph nodes of the mediastinum and  the lower neck.  2. Diffuse hepatic metastasis.  3. Bony metastasis.  4. Heterogeneous activity of the gray-white matter interface of the  base the brain. Findings worrisome for potential intracranial  metastasis. Follow-up MRI brain with and without contrast should be  considered.       I have personally reviewed and interpreted the radiology studies and ECG obtained at time of admission.     Assessment / Plan     Assessment:   Active Hospital Problems    Diagnosis   • Failure to thrive in adult     Assessment:  1.  Failure to thrive felt due to cancer  2.  Debility related to cancer  3.  Lung cancer, metastatic disease due to bones and liver  4.  Acute pain due to bony metastasis   5.  Acute uncomplicated cystitis with leukocytosis  6.  Suspected protein calorie malnutrition   7.  Elevated alkaline phosphatase, AST, ALT, and bilirubin  - mets   8.  Elevated troponin, absence of chest pain, suspect Type 2  NSTEMI   10. Edema     Plan:   1.  CT scans and PET scans from [facility] reviewed and outlined above  2.  Consult heme/onc - discussed with covering physician; poor prognosis; palliation  3.  Patient would benefit from palliative care consult  4.  Echocardiogram given new onset worsening lower extremity edema  5.  Continue ceftriaxone  6.  Urinary culture pending  7.  PT/OT, SW consults  8.  IVF  9.  PRN pain medication   10.  RUQ US to evaluate biliary ducts pending   11.  Nutrition consult      in addition to above  D/c ivf   Diuretic  Dexamethasone added  Will pale on fentanyl patch for cancer pain, d/c norco; use morphine ir prn for breakthrough pain   Defer to Onc re: radiation oncologist  Palliative care consult ordered   pain control  Control breathing  Supportive care  Dnr/DNI      [START ON 3/10/2019] ceftriaxone 1 g Intravenous Q24H   sodium chloride 3 mL Intravenous Q12H                  Estimated length of stay  To be determined    Jey Navarro MD   03/09/19   7:36 AM

## 2019-03-09 NOTE — H&P
[facility]  HISTORY AND PHYSICAL    Date of Admission: 3/9/2019  Primary Care Physician: Aide Garces DO    Subjective     Chief Complaint: weaknss    History of Present Illness    Mrs. Ferris is a 57 yo F presenting with worsening lower extremity edema, poor PO intake and severe weakness as well as uncontrolled pain.  Patient has a past medical history of lung cancer, she has previously been cured, but now she has lung cancer again after she continued to smoke after her initial bout with lung cancer.  Patient is supposed to start radiation treatments on Monday.  She indicates that she has been having difficulty with p.o. intake, worsening shortness of breath, and generalized weakness.  She has been having more more difficulty performing her ADLs.    Patient denies any burning urination, but does report some loose stools.          Review of Systems   Constitutional: Positive for activity change, appetite change and fatigue. Negative for chills, diaphoresis and fever.   Respiratory: Positive for cough and shortness of breath.    Cardiovascular: Positive for leg swelling. Negative for chest pain and palpitations.   Gastrointestinal: Negative for abdominal distention, abdominal pain, constipation, diarrhea, nausea and vomiting.   Musculoskeletal: Negative for arthralgias and myalgias.   Skin: Negative for pallor, rash and wound.   Neurological: Positive for dizziness and weakness.   All other systems reviewed and are negative.       Otherwise complete ROS reviewed and negative except as mentioned in the HPI.    Past Medical History:   Past Medical History:   Diagnosis Date   • Adenocarcinoma of lung (CMS/HCC) 2014   • Anemia     d/t chemotherapy   • Anxiety    • Atrial fibrillation (CMS/HCC)    • Chronic pain    • COPD (chronic obstructive pulmonary disease) (CMS/HCC)    • Coronary artery disease    • Depression    • DVT (deep venous thrombosis)  (CMS/HCC)     left leg, in past   • Endobronchial mass 2/18/2019   • Generalized headaches    • High serum carcinoembryonic antigen (CEA)    • History of radiation therapy     6300 cGy to lung completed 8-6-2014; prophylactic brain radiation 2600 cGy completed 4-   • Hx of degenerative disc disease    • Hyperglycemia    • Hyperlipidemia    • Hypertension    • Meningitis    • Morbid obesity (CMS/HCC)    • Nephrolithiasis    • Non-small cell cancer of left lung (CMS/HCC) 2/25/2019   • Osteoarthritis    • Oxygen desaturation during sleep     pt states oxygen company came and got oxygen ,did not qualify for usage   • Personal history of chemotherapy     For Lung Cancer   • Recurrent pleural effusion on left 2/18/2019     Past Surgical History:  Past Surgical History:   Procedure Laterality Date   • BRONCHOSCOPY     • CARDIOVERSION      2014   • COLONOSCOPY     • KNEE SURGERY Left     x 3   • PARTIAL HYSTERECTOMY  12/2003   • PORTACATH PLACEMENT Left    • SKIN LESION EXCISION      Negative for malignacy   • TONSILLECTOMY     • URETEROSCOPY LASER LITHOTRIPSY WITH STENT INSERTION Right 5/21/2018    Procedure: CYSTO, URETEROSCOPY LASER LITHOTRIPSY WITH STENT INSERTION, RETROGRADE PYELOGRAM,;  Surgeon: Grzegorz Landaverde MD;  Location: [facility];  Service: Urology     Social History:  reports that she quit smoking about 5 years ago. Her smoking use included cigarettes. She has a 60.00 pack-year smoking history. she has never used smokeless tobacco. She reports that she does not drink alcohol or use drugs.    Family History: family history includes Cancer in her brother and sister; Heart failure in her mother; Lung cancer in her sister; No Known Problems in her sister; Prostate cancer in her father.       Allergies:  Allergies   Allergen Reactions   • Codeine Itching   • Penicillins Hives   • Ace Inhibitors Cough     Medications:  Prior to Admission medications    Medication Sig Start Date End Date Taking?  Authorizing Provider   albuterol (PROVENTIL HFA;VENTOLIN HFA) 108 (90 BASE) MCG/ACT inhaler Inhale 2 puffs Every 4 (Four) Hours As Needed for wheezing.    Margi Reyes MD   albuterol (PROVENTIL) (2.5 MG/3ML) 0.083% nebulizer solution Take 2.5 mg by nebulization Every 4 (Four) Hours As Needed for Wheezing for up to 30 days. 2/26/19 3/28/19  Anastasia Varner APRN   B Complex Vitamins (VITAMIN B COMPLEX PO) Take  by mouth.    Margi Reyes MD   celecoxib (CeleBREX) 200 MG capsule Take 200 mg by mouth Daily.    Margi Reyes MD   diclofenac (VOLTAREN) 1 % gel gel Apply 4 g topically 4 (Four) Times a Day As Needed.    Margi Reyes MD   HYDROcodone-acetaminophen (NORCO)  MG per tablet Take 1 tablet by mouth Every 6 (Six) Hours As Needed for moderate pain (4-6).    Margi Reyes MD   LORazepam (ATIVAN) 1 MG tablet Take 1 mg by mouth Every 8 (Eight) Hours As Needed for anxiety.    Margi Reyes MD   losartan (COZAAR) 50 MG tablet Take 100 mg by mouth Daily.    Margi Reyes MD   polyethylene glycol (MIRALAX) packet Take 17 g by mouth Daily.    Margi Reyes MD   promethazine (PHENERGAN) 25 MG tablet  11/29/18   Margi Reyes MD   raNITIdine (ZANTAC) 150 MG tablet Take 150 mg by mouth 2 (Two) Times a Day.    Margi Reyes MD   Sennosides 8.6 MG capsule Take  by mouth As Needed.    Margi Reyes MD   sertraline (ZOLOFT) 100 MG tablet Take 100 mg by mouth Daily.    Margi Reyes MD   sotalol (BETAPACE) 160 MG tablet Take 160 mg by mouth 2 (Two) Times a Day.    Margi Reyes MD   temazepam (RESTORIL) 30 MG capsule Take 30 mg by mouth At Night As Needed for sleep.    aMrgi Reyes MD   tiZANidine (ZANAFLEX) 4 MG tablet Take 4 mg by mouth At Night As Needed for muscle spasms.    Margi Reyes MD     Objective     Vital Signs: /76   Pulse 76   Temp 97.3 °F (36.3 °C)   Resp 19   Ht 177.8 cm  "(70\")   Wt 113 kg (250 lb)   LMP  (LMP Unknown)   SpO2 95%   BMI 35.87 kg/m²   Physical Exam   Constitutional: She is oriented to person, place, and time. No distress.   HENT:   Head: Normocephalic and atraumatic.   Eyes: Conjunctivae are normal. No scleral icterus.   Neck: Neck supple. No JVD present.   Cardiovascular: Normal rate and regular rhythm.   Murmur heard.  Pulmonary/Chest: She is in respiratory distress. She has no wheezes. She has no rales.   Decreased on left side; tachypnea   Abdominal: Soft. Bowel sounds are normal. She exhibits no distension and no mass. There is no tenderness. There is no guarding.   Musculoskeletal: She exhibits no edema (1-2+ bilateral lower extremity ).   Neurological: She is alert and oriented to person, place, and time.   Skin: Skin is warm. She is diaphoretic. No erythema. No pallor.   Psychiatric: She has a normal mood and affect. Her behavior is normal.   Nursing note and vitals reviewed.          Results Reviewed:  Lab Results (last 24 hours)     Procedure Component Value Units Date/Time    Urinalysis, Microscopic Only - Urine, Clean Catch [[id number]]  (Abnormal) Collected:  03/09/19 0010    Specimen:  Urine, Clean Catch Updated:  03/09/19 0038     RBC, UA 0-2 /HPF      WBC, UA 21-30 /HPF      Bacteria, UA 2+ /HPF      Squamous Epithelial Cells, UA 3-6 /HPF      Hyaline Casts, UA 0-2 /LPF      Methodology Manual Light Microscopy    Urinalysis With Culture If Indicated - Urine, Clean Catch [[id number]]  (Abnormal) Collected:  03/09/19 0010    Specimen:  Urine, Clean Catch Updated:  03/09/19 0038     Color, UA Dark Yellow     Appearance, UA Cloudy     pH, UA 5.5     Specific Gravity, UA 1.024     Glucose, UA Negative     Ketones, UA Trace     Bilirubin, UA Large (3+)     Blood, UA Negative     Protein, UA 30 mg/dL (1+)     Leuk Esterase, UA Large (3+)     Nitrite, UA Positive     Urobilinogen, UA 2.0 E.U./dL    Urine Culture - Urine, Urine, Clean Catch [[id number]] " Collected:  03/09/19 0010    Specimen:  Urine, Clean Catch Updated:  03/09/19 0038    [place] Draw [[id number]] Collected:  03/08/19 2105    Specimen:  Blood Updated:  03/09/19 0000    Narrative:       The following orders were created for panel order [place] Draw.  Procedure                               Abnormality         Status                     ---------                               -----------         ------                     Light Blue Top[[id number]]                                   Final result               Green Top (Gel)[[id number]]                                  Final result               Lavender Top[[id number]]                                     Final result               Red Top[[id number]]                                          Final result                 Please view results for these tests on the individual orders.    Green Top (Gel) [[id number]] Collected:  03/08/19 2247    Specimen:  Blood Updated:  03/09/19 0000     Extra Tube Hold for add-ons.     Comment: Auto resulted.       BNP [[id number]]  (Abnormal) Collected:  03/08/19 2246    Specimen:  Blood Updated:  03/08/19 2315     proBNP 3,160.0 pg/mL     Troponin [[id number]]  (Abnormal) Collected:  03/08/19 2246    Specimen:  Blood Updated:  03/08/19 2315     Troponin I 0.045 ng/mL     Lipase [[id number]]  (Abnormal) Collected:  03/08/19 2246    Specimen:  Blood Updated:  03/08/19 2303     Lipase 215 U/L     Comprehensive Metabolic Panel [[id number]]  (Abnormal) Collected:  03/08/19 2246    Specimen:  Blood Updated:  03/08/19 2303     Glucose 118 mg/dL      BUN 48 mg/dL      Creatinine 1.12 mg/dL      Sodium 138 mmol/L      Potassium 5.0 mmol/L      Chloride 100 mmol/L      CO2 23.0 mmol/L      Calcium 9.4 mg/dL      Total Protein 5.8 g/dL      Albumin 3.40 g/dL      ALT (SGPT) 236 U/L      AST (SGOT) 308 U/L      Alkaline Phosphatase 508 U/L      Total Bilirubin 6.2 mg/dL      eGFR Non African Amer 50 mL/min/1.73      Globulin 2.4 gm/dL       A/G Ratio 1.4 g/dL      BUN/Creatinine Ratio 42.9     Anion Gap 15.0 mmol/L     Light Blue Top [[id number]] Collected:  03/08/19 2105    Specimen:  Blood from Hand, Right Updated:  03/08/19 2215     Extra Tube hold for add-on     Comment: Auto resulted       Red Top [[id number]] Collected:  03/08/19 2105    Specimen:  Blood Updated:  03/08/19 2215     Extra Tube Hold for add-ons.     Comment: Auto resulted.       Lavender Top [[id number]] Collected:  03/08/19 2105    Specimen:  Blood Updated:  03/08/19 2215     Extra Tube hold for add-on     Comment: Auto resulted       CBC & Differential [[id number]] Collected:  03/08/19 2105    Specimen:  Blood Updated:  03/08/19 2146    Narrative:       The following orders were created for panel order CBC & Differential.  Procedure                               Abnormality         Status                     ---------                               -----------         ------                     CBC Auto Differential[[id number]]        Abnormal            Final result                 Please view results for these tests on the individual orders.    CBC Auto Differential [[id number]]  (Abnormal) Collected:  03/08/19 2105    Specimen:  Blood Updated:  03/08/19 2146     WBC 16.41 10*3/mm3      RBC 4.26 10*6/mm3      Hemoglobin 12.5 g/dL      Hematocrit 37.2 %      MCV 87.3 fL      MCH 29.3 pg      MCHC 33.6 g/dL      RDW 20.2 %      RDW-SD 61.5 fl      MPV 12.7 fL      Platelets 212 10*3/mm3      Neutrophil % 86.4 %      Lymphocyte % 2.1 %      Monocyte % 8.7 %      Eosinophil % 0.0 %      Basophil % 0.4 %      Immature Grans % 2.4 %      Neutrophils, Absolute 14.19 10*3/mm3      Lymphocytes, Absolute 0.34 10*3/mm3      Monocytes, Absolute 1.43 10*3/mm3      Eosinophils, Absolute 0.00 10*3/mm3      Basophils, Absolute 0.06 10*3/mm3      Immature Grans, Absolute 0.39 10*3/mm3      nRBC 0.6 /100 WBC         Imaging Results (last 24 hours)     Procedure Component Value Units Date/Time    XR  Chest 2 View [[id number]] Collected:  03/08/19 2250     Updated:  03/08/19 2254    Narrative:       EXAMINATION: XR CHEST 2 VW-  3/8/2019 10:50 PM CST     TWO-VIEW CHEST:      HISTORY: Shortness of air      Frontal and lateral projection chest radiograph obtained.     COMPARISON:  2/12/2019      FINDINGS:     Left hemithorax opacification again noted.     The right lung is grossly clear.     Infusion catheter again identified.         The bony structures are intact.                                                                                                                  Impression:       1. Persistent left hemithorax opacification.  2. Right lung is clear.        This report was finalized on 03/08/2019 22:51 by Dr. Blas Mahajan MD.        I have personally reviewed and interpreted the radiology studies and ECG obtained at time of admission.     Assessment / Plan     Assessment:   Active Hospital Problems    Diagnosis   • Failure to thrive in adult       Assessment:  1.  Failure to thrive   2.  Debility  3.  Lung cancer, metastatic disease due to bones and liver  4.  Acute pain due to bony metastasis   5.  Acute uncomplicated cystitis with leukocytosis  6.  Suspected protein calorie malnutrition   7.  Elevated alkaline phosphatase, AST, ALT, and bilirubin   8.  Elevated troponin, absence of chest pain, suspect Type 2 NSTEMI     Plan:   1.  CT scans and PET scans from [facility] reviewed  2.  Consult heme/onc  3.  Patient would benefit from palliative care consult  4.  Echocardiogram given new onset worsening lower extremity edema  5.  Continue ceftriaxone  6.  Urinary culture pending  7.  PT/OT, SW consults  8.  IVF  9.  PRN pain medication   10.  RUQ US to evaluate biliary ducts   11.  Nutrition consult     PATIENT SEEN AFTER MIDNIGHT.    Code Status: DNR/DNI     I discussed the patient's findings and my recommendations with the patient and friend.        Estimated length of stay ?     Saúl Luong MD    03/09/19   2:24 AM

## 2019-03-09 NOTE — PLAN OF CARE
Problem: Patient Care Overview  Goal: Plan of Care Review  Outcome: Ongoing (interventions implemented as appropriate)   03/09/19 2600   Coping/Psychosocial   Plan of Care Reviewed With patient   Plan of Care Review   Progress no change   OTHER   Outcome Summary Pt reports decreased appetite, states she is eating a bit more since admit than she was at home. Pt states she eats 1 meal daily. Noted wt loss of 37lbs over 1 year. Pt agreeable to CBE and Boost milkshake. Encouraged PO intake. NFPE completed, MSA submitted to MD. Will continue to follow.

## 2019-03-09 NOTE — PLAN OF CARE
Problem: Patient Care Overview  Goal: Plan of Care Review  Outcome: Ongoing (interventions implemented as appropriate)   03/09/19 0501   Coping/Psychosocial   Plan of Care Reviewed With patient   Plan of Care Review   Progress no change   OTHER   Outcome Summary New admit from ED this shift. Patient reports weight loss and decreased appetite. Abx infusing. c/o pain. Safety maintained.     Goal: Individualization and Mutuality  Outcome: Ongoing (interventions implemented as appropriate)    Goal: Discharge Needs Assessment  Outcome: Ongoing (interventions implemented as appropriate)    Goal: Interprofessional Rounds/Family Conf  Outcome: Ongoing (interventions implemented as appropriate)      Problem: Fall Risk (Adult)  Goal: Identify Related Risk Factors and Signs and Symptoms  Outcome: Ongoing (interventions implemented as appropriate)    Goal: Absence of Fall  Outcome: Ongoing (interventions implemented as appropriate)      Problem: Skin Injury Risk (Adult)  Goal: Identify Related Risk Factors and Signs and Symptoms  Outcome: Outcome(s) achieved Date Met: 03/09/19    Goal: Skin Health and Integrity  Outcome: Ongoing (interventions implemented as appropriate)      Problem: Nutrition, Imbalanced: Inadequate Oral Intake (Adult)  Goal: Identify Related Risk Factors and Signs and Symptoms  Outcome: Outcome(s) achieved Date Met: 03/09/19    Goal: Improved Oral Intake  Outcome: Ongoing (interventions implemented as appropriate)    Goal: Prevent Further Weight Loss  Outcome: Ongoing (interventions implemented as appropriate)      Problem: Pain, Acute (Adult)  Goal: Identify Related Risk Factors and Signs and Symptoms  Outcome: Outcome(s) achieved Date Met: 03/09/19    Goal: Acceptable Pain Control/Comfort Level  Outcome: Ongoing (interventions implemented as appropriate)

## 2019-03-09 NOTE — PROGRESS NOTES
Malnutrition Severity Assessment    Patient Name:  Cary Ferris  YOB: 1960  MRN: [id number]  Admit Date:  3/8/2019    Patient meets criteria for : Moderate malnutrition    Comments:  If in agreement with malnutrition assessment, please attest documentation. Thanks.     Malnutrition Type: Chronic Illness Malnutrition     Malnutrition Type (last 8 hours)      Malnutrition Severity Assessment     Row Name 03/09/19 1445       Malnutrition Severity Assessment    Malnutrition Type  Chronic Illness Malnutrition    Row Name 03/09/19 1445       Physical Signs of Malnutrition (Chronic)    Muscle Wasting  Mild temporalis with slight depression; interosseous muscle with slight depression    Fat Loss  Mild orbital region slightly dark circles, somewhat hollow appearance; upper arm region with fat present but not ample, lots of loose skin;     Fluid Accumulation  Mild    Row Name 03/09/19 1445       Weight Status (Chronic)    %UBW  Mild (86-90%) 86%    Weight Loss  Mild (>10% / 1 yr) 13%    Row Name 03/09/19 1445       Energy Intake Status (Chronic)    Energy Intake  Severe (< or equal to 50% / > or equal to 1 mo)    Row Name 03/09/19 1445       Criteria Met (Must meet criteria for severity in at least 2 of these categories: M Wasting, Fat Loss, Fluid, Secondary Signs, Wt. Status, Intake)    Patient meets criteria for   Moderate malnutrition          Electronically signed by:  Janice Soares RD, DELFIN  03/09/19 2:58 PM

## 2019-03-09 NOTE — PLAN OF CARE
Problem: Patient Care Overview  Goal: Plan of Care Review  Outcome: Ongoing (interventions implemented as appropriate)   03/09/19 5986   Coping/Psychosocial   Plan of Care Reviewed With patient   Plan of Care Review   Progress no change   OTHER   Outcome Summary A&Ox4. Pain somewhat controlled with ordered meds. Poor appetite. Duragesic patch to R shoulder. IVF discontinued. Safety maintained, turned frequently to promote skin integrity.        Problem: Fall Risk (Adult)  Goal: Identify Related Risk Factors and Signs and Symptoms  Outcome: Ongoing (interventions implemented as appropriate)    Goal: Absence of Fall  Outcome: Ongoing (interventions implemented as appropriate)      Problem: Skin Injury Risk (Adult)  Goal: Skin Health and Integrity  Outcome: Ongoing (interventions implemented as appropriate)      Problem: Nutrition, Imbalanced: Inadequate Oral Intake (Adult)  Goal: Improved Oral Intake  Outcome: Ongoing (interventions implemented as appropriate)    Goal: Prevent Further Weight Loss  Outcome: Ongoing (interventions implemented as appropriate)      Problem: Pain, Acute (Adult)  Goal: Acceptable Pain Control/Comfort Level  Outcome: Ongoing (interventions implemented as appropriate)

## 2019-03-10 ENCOUNTER — APPOINTMENT (OUTPATIENT)
Dept: ULTRASOUND IMAGING | Facility: HOSPITAL | Age: 59
End: 2019-03-10

## 2019-03-10 ENCOUNTER — APPOINTMENT (OUTPATIENT)
Dept: CARDIOLOGY | Facility: HOSPITAL | Age: 59
End: 2019-03-10

## 2019-03-10 PROBLEM — Z71.9 ENCOUNTER FOR CONSULTATION: Status: ACTIVE | Noted: 2019-03-10

## 2019-03-10 LAB
ANION GAP SERPL CALCULATED.3IONS-SCNC: 13 MMOL/L (ref 4–13)
BASOPHILS # BLD AUTO: 0.04 10*3/MM3 (ref 0–0.2)
BASOPHILS NFR BLD AUTO: 0.3 % (ref 0–2)
BUN BLD-MCNC: 48 MG/DL (ref 5–21)
BUN/CREAT SERPL: 40.7 (ref 7–25)
CALCIUM SPEC-SCNC: 9.3 MG/DL (ref 8.4–10.4)
CHLORIDE SERPL-SCNC: 99 MMOL/L (ref 98–110)
CO2 SERPL-SCNC: 27 MMOL/L (ref 24–31)
CREAT BLD-MCNC: 1.18 MG/DL (ref 0.5–1.4)
DEPRECATED RDW RBC AUTO: 63.5 FL (ref 40–54)
EOSINOPHIL # BLD AUTO: 0.01 10*3/MM3 (ref 0–0.7)
EOSINOPHIL NFR BLD AUTO: 0.1 % (ref 0–4)
ERYTHROCYTE [DISTWIDTH] IN BLOOD BY AUTOMATED COUNT: 21 % (ref 12–15)
GFR SERPL CREATININE-BSD FRML MDRD: 47 ML/MIN/1.73
GLUCOSE BLD-MCNC: 121 MG/DL (ref 70–100)
HCT VFR BLD AUTO: 37 % (ref 37–47)
HGB BLD-MCNC: 12.3 G/DL (ref 12–16)
IMM GRANULOCYTES # BLD AUTO: 0.3 10*3/MM3 (ref 0–0.05)
IMM GRANULOCYTES NFR BLD AUTO: 2.1 % (ref 0–5)
LYMPHOCYTES # BLD AUTO: 0.3 10*3/MM3 (ref 0.72–4.86)
LYMPHOCYTES NFR BLD AUTO: 2.1 % (ref 15–45)
MCH RBC QN AUTO: 29.1 PG (ref 28–32)
MCHC RBC AUTO-ENTMCNC: 33.2 G/DL (ref 33–36)
MCV RBC AUTO: 87.7 FL (ref 82–98)
MONOCYTES # BLD AUTO: 1.3 10*3/MM3 (ref 0.19–1.3)
MONOCYTES NFR BLD AUTO: 9 % (ref 4–12)
NEUTROPHILS # BLD AUTO: 12.42 10*3/MM3 (ref 1.87–8.4)
NEUTROPHILS NFR BLD AUTO: 86.4 % (ref 39–78)
NRBC BLD AUTO-RTO: 0.4 /100 WBC (ref 0–0)
PLATELET # BLD AUTO: 155 10*3/MM3 (ref 130–400)
PMV BLD AUTO: 12.7 FL (ref 6–12)
POTASSIUM BLD-SCNC: 4.6 MMOL/L (ref 3.5–5.3)
RBC # BLD AUTO: 4.22 10*6/MM3 (ref 4.2–5.4)
SODIUM BLD-SCNC: 139 MMOL/L (ref 135–145)
WBC NRBC COR # BLD: 14.37 10*3/MM3 (ref 4.8–10.8)

## 2019-03-10 PROCEDURE — 85025 COMPLETE CBC W/AUTO DIFF WBC: CPT | Performed by: INTERNAL MEDICINE

## 2019-03-10 PROCEDURE — 25010000002 FUROSEMIDE PER 20 MG: Performed by: INTERNAL MEDICINE

## 2019-03-10 PROCEDURE — 25010000002 MORPHINE PER 10 MG: Performed by: INTERNAL MEDICINE

## 2019-03-10 PROCEDURE — 93306 TTE W/DOPPLER COMPLETE: CPT | Performed by: INTERNAL MEDICINE

## 2019-03-10 PROCEDURE — 76705 ECHO EXAM OF ABDOMEN: CPT

## 2019-03-10 PROCEDURE — 25010000002 DEXAMETHASONE PER 1 MG: Performed by: INTERNAL MEDICINE

## 2019-03-10 PROCEDURE — 80048 BASIC METABOLIC PNL TOTAL CA: CPT | Performed by: INTERNAL MEDICINE

## 2019-03-10 PROCEDURE — 94799 UNLISTED PULMONARY SVC/PX: CPT

## 2019-03-10 PROCEDURE — 99232 SBSQ HOSP IP/OBS MODERATE 35: CPT | Performed by: INTERNAL MEDICINE

## 2019-03-10 PROCEDURE — 25010000002 CEFTRIAXONE PER 250 MG: Performed by: INTERNAL MEDICINE

## 2019-03-10 PROCEDURE — 93306 TTE W/DOPPLER COMPLETE: CPT

## 2019-03-10 RX ADMIN — MORPHINE SULFATE 4 MG: 4 INJECTION, SOLUTION INTRAMUSCULAR; INTRAVENOUS at 23:57

## 2019-03-10 RX ADMIN — SODIUM CHLORIDE, PRESERVATIVE FREE 10 ML: 5 INJECTION INTRAVENOUS at 17:35

## 2019-03-10 RX ADMIN — CEFTRIAXONE SODIUM 1 G: 1 INJECTION, POWDER, FOR SOLUTION INTRAMUSCULAR; INTRAVENOUS at 04:00

## 2019-03-10 RX ADMIN — FUROSEMIDE 40 MG: 10 INJECTION, SOLUTION INTRAVENOUS at 08:38

## 2019-03-10 RX ADMIN — MORPHINE SULFATE 4 MG: 4 INJECTION, SOLUTION INTRAMUSCULAR; INTRAVENOUS at 00:38

## 2019-03-10 RX ADMIN — MORPHINE SULFATE 4 MG: 4 INJECTION, SOLUTION INTRAMUSCULAR; INTRAVENOUS at 06:12

## 2019-03-10 RX ADMIN — MORPHINE SULFATE 4 MG: 4 INJECTION, SOLUTION INTRAMUSCULAR; INTRAVENOUS at 11:16

## 2019-03-10 RX ADMIN — DEXAMETHASONE SODIUM PHOSPHATE 8 MG: 4 INJECTION, SOLUTION INTRA-ARTICULAR; INTRALESIONAL; INTRAMUSCULAR; INTRAVENOUS; SOFT TISSUE at 17:35

## 2019-03-10 RX ADMIN — SODIUM CHLORIDE, PRESERVATIVE FREE 3 ML: 5 INJECTION INTRAVENOUS at 20:02

## 2019-03-10 RX ADMIN — SODIUM CHLORIDE, PRESERVATIVE FREE 3 ML: 5 INJECTION INTRAVENOUS at 08:38

## 2019-03-10 RX ADMIN — SODIUM CHLORIDE, PRESERVATIVE FREE 10 ML: 5 INJECTION INTRAVENOUS at 11:16

## 2019-03-10 RX ADMIN — DEXAMETHASONE SODIUM PHOSPHATE 8 MG: 4 INJECTION, SOLUTION INTRA-ARTICULAR; INTRALESIONAL; INTRAMUSCULAR; INTRAVENOUS; SOFT TISSUE at 06:07

## 2019-03-10 RX ADMIN — MORPHINE SULFATE 4 MG: 4 INJECTION, SOLUTION INTRAMUSCULAR; INTRAVENOUS at 20:02

## 2019-03-10 NOTE — PLAN OF CARE
Problem: Patient Care Overview  Goal: Plan of Care Review  Outcome: Ongoing (interventions implemented as appropriate)   03/10/19 0539   Coping/Psychosocial   Plan of Care Reviewed With patient   Plan of Care Review   Progress improving   OTHER   Outcome Summary Repositioned every 2 hours, pain controlled with Fentanyl patch and Morphine, VSS, voiding per bedpan, abx given as ordered, safety maintained, will continue to monitor.      Goal: Individualization and Mutuality  Outcome: Ongoing (interventions implemented as appropriate)      Problem: Fall Risk (Adult)  Goal: Identify Related Risk Factors and Signs and Symptoms  Outcome: Outcome(s) achieved Date Met: 03/10/19    Goal: Absence of Fall  Outcome: Ongoing (interventions implemented as appropriate)      Problem: Skin Injury Risk (Adult)  Goal: Skin Health and Integrity  Outcome: Ongoing (interventions implemented as appropriate)      Problem: Nutrition, Imbalanced: Inadequate Oral Intake (Adult)  Goal: Improved Oral Intake  Outcome: Ongoing (interventions implemented as appropriate)    Goal: Prevent Further Weight Loss  Outcome: Ongoing (interventions implemented as appropriate)      Problem: Pain, Acute (Adult)  Goal: Acceptable Pain Control/Comfort Level  Outcome: Ongoing (interventions implemented as appropriate)      Problem: Oncology Care (Adult)  Goal: Signs and Symptoms of Listed Potential Problems Will be Absent, Minimized or Managed (Oncology Care)  Outcome: Ongoing (interventions implemented as appropriate)

## 2019-03-10 NOTE — PROGRESS NOTES
1           [facility] Medicine Services  INPATIENT PROGRESS NOTE    Patient Name: Cary Ferris  Date of Admission: 3/8/2019  Today's Date: 03/10/19  Length of Stay: 1  Primary Care Physician: Aide Garces DO    Subjective   Chief Complaint: Follow-up  HPI   He had prior chest x-ray sometime February 2019 that showed similar finding to her hospital admission chest x-ray (left hemithorax opacity).  I am not quite certain as to current plan pertaining to this but I believe she was set to have radiation treatment/consultation tomorrow.    Since yesterday, dexamethasone was added including fentanyl patch with goal for palliative care and consultation.  Oncology service following patient.    From an office note from an office note dated February 19, 2019 by Aleksey Smith with pulmonary I learned that she had bronchoscopy but there was no visible to more in the left bronchus.  Cytology reportedly was negative for malignancies.  Follow-up clinic visits on February 26, 2019 Dr. Hernandez did a bronchoscopy with biopsy at [facility].  It came out positive for non-small cell lung cancer.    Review of Systems     All pertinent negatives and positives are as above. All other systems have been reviewed and are negative unless otherwise stated.     Objective    Temp:  [97.4 °F (36.3 °C)-98 °F (36.7 °C)] 97.4 °F (36.3 °C)  Heart Rate:  [81-95] 94  Resp:  [16-18] 18  BP: (115-153)/(70-89) 124/70  Physical Exam    Constitutional: She is oriented to person, place, and time. No distress. pale  HENT:   Head: Normocephalic and atraumatic.   Eyes: Conjunctivae are normal. No scleral icterus.   Neck: Neck supple. No JVD present.   Cardiovascular: Normal rate and regular rhythm.   Murmur heard. - not readily heard by me  Pulmonary/Chest: She is in no  respiratory distress.  Calm, no acc muscle use. She has no wheezes. She has no rales. significantly diminished breath sounds on the left  lung field  Decreased on left side  Abdominal: Soft. Bowel sounds are normal. She exhibits no distension and no mass. There is no tenderness. There is no guarding.   Musculoskeletal: She exhibits no edema (1-2+ bilateral lower extremity ).   Neurological: She is alert and oriented to person, place, and time.   Skin: Skin is warm. No erythema. No pallor.   Psychiatric: She has a normal mood and affect. Her behavior is normal.   Nursing note and vitals reviewed.              Results Review:  I have reviewed the labs, radiology results, and diagnostic studies.    Laboratory Data:   Results from last 7 days   Lab Units 03/10/19  0459 03/09/19  0521 03/09/19  0336   WBC 10*3/mm3 14.37* 15.98* 15.94*   HEMOGLOBIN g/dL 12.3 12.1 12.5   HEMATOCRIT % 37.0 36.6* 37.2   PLATELETS 10*3/mm3 155 176 187        Results from last 7 days   Lab Units 03/10/19  0459 03/09/19  0521 03/09/19  0336 03/08/19 2246 03/07/19  1004   SODIUM mmol/L 139 138 137 138 139   POTASSIUM mmol/L 4.6 4.7 4.8 5.0 4.5   CHLORIDE mmol/L 99 98 99 100 100   CO2 mmol/L 27.0 23.0* 21.0* 23.0* 25.0   BUN mg/dL 48* 48* 49* 48* 37*   CREATININE mg/dL 1.18 1.21 1.16 1.12 1.05   CALCIUM mg/dL 9.3 9.6 9.6 9.4 9.3   BILIRUBIN mg/dL  --  6.4*  --  6.2* 4.4*   ALK PHOS U/L  --  465*  --  508* 525*   ALT (SGPT) U/L  --  221*  --  236* 219*   AST (SGOT) U/L  --  282*  --  308* 225*   GLUCOSE mg/dL 121* 100 100 118* 97       Culture Data:   Urine Culture   Date Value Ref Range Status   03/09/2019 50,000-60,000 CFU/mL Escherichia coli (A)  Preliminary   03/09/2019 10,000-20,000 CFU/mL Mixed Gram Positive Georgina (A)  Preliminary     Comment:     Probable contaminant.         Radiology Data:   Imaging Results (last 24 hours)     ** No results found for the last 24 hours. **          I have reviewed the patient's current medications.     Assessment/Plan     Active Hospital Problems    Diagnosis   • Failure to thrive in adult       Assessment:  1.  Failure to thrive felt due  to cancer  2.  Debility related to cancer  3.  Lung cancer, metastatic disease due to bones and liver  4.  Acute pain due to bony metastasis   5.  Acute uncomplicated cystitis with leukocytosis  6.  Suspected protein calorie malnutrition   7.  Elevated alkaline phosphatase, AST, ALT, and bilirubin  - mets   8.  Elevated troponin, absence of chest pain, suspect Type 2 NSTEMI   10. Edema         I learned further from her she had prior chest tube sometime late January to early Feb this month.  From Dr. Hernandez's note in February office visit, she has recurrent pleural effusion.   She seems comfortable with addition of fentanyl patch and prn IV morphine  Palliative care is consulted.  She is on 2LPM with O2 sat at 93%  The covering oncologist prognosis is poor and expressed Hospice.    on discharge planning   Cpt        Discharge Plannin-2 days      Jey Navarro MD   03/10/19   1:44 PM

## 2019-03-10 NOTE — PLAN OF CARE
Problem: Patient Care Overview  Goal: Plan of Care Review  Outcome: Ongoing (interventions implemented as appropriate)   03/10/19 3432   Coping/Psychosocial   Plan of Care Reviewed With patient   Plan of Care Review   Progress no change   OTHER   Outcome Summary A&Ox4. Pain controlled with ordered meds. Fentanyl patch R posterior shoulder. VSS, safety maintained. Turned frequently to promote skin integrity. Appetite still poor.        Problem: Fall Risk (Adult)  Goal: Absence of Fall  Outcome: Ongoing (interventions implemented as appropriate)      Problem: Skin Injury Risk (Adult)  Goal: Skin Health and Integrity  Outcome: Ongoing (interventions implemented as appropriate)      Problem: Nutrition, Imbalanced: Inadequate Oral Intake (Adult)  Goal: Improved Oral Intake  Outcome: Ongoing (interventions implemented as appropriate)    Goal: Prevent Further Weight Loss  Outcome: Ongoing (interventions implemented as appropriate)      Problem: Pain, Acute (Adult)  Goal: Acceptable Pain Control/Comfort Level  Outcome: Ongoing (interventions implemented as appropriate)      Problem: Oncology Care (Adult)  Goal: Signs and Symptoms of Listed Potential Problems Will be Absent, Minimized or Managed (Oncology Care)  Outcome: Ongoing (interventions implemented as appropriate)

## 2019-03-10 NOTE — PROGRESS NOTES
"PROBLEM LIST:  1.  Non-small cell lung cancer, second primary tumor.  She has bone and liver metastasis  on PET scan (and elevated bilirubin) along with mediastinal adenopathy with multiple areas of obstruction in the left lung.  She also has a right upper lobe mass that is apparently the primary tumor.  2.  History of small cell lung cancer in 2014 treated with chemoradiation including PCI.     Subjective     HISTORY OF PRESENT ILLNESS:  Chief complaint: Lung cancer and weakness  Ms. Ferris continues to be very weak.  She doesn't note any increased dyspnea and says that since she's been inactive and wearing oxygen that she doesn't have any significant dyspnea.  She does have some pain but she is managing this with her current analgesics.      Past Medical History, Past Surgical History, Social History, Family History have been reviewed and are without significant changes except as mentioned.    Review of Systems   A comprehensive 14 point review of systems was performed and was negative except as mentioned.    Medications:  The current medication list was reviewed in the EMR    ALLERGIES:    Allergies   Allergen Reactions   • Codeine Itching   • Penicillins Hives   • Ace Inhibitors Cough       Objective      /80 (BP Location: Left arm, Patient Position: Lying)   Pulse 95   Temp 97.7 °F (36.5 °C) (Axillary)   Resp 18   Ht 177.8 cm (70\")   Wt 111 kg (244 lb 6.4 oz)   LMP  (LMP Unknown)   SpO2 94%   BMI 35.07 kg/m²     General: ill-appearing, in no acute distress, wearing oxygen and in a hospital bed   HEENT: sclera anicteric, oropharynx clear  Lymphatics: no cervical, supraclavicular, or axillary adenopathy  Cardiovascular: regular rate and rhythm, no murmurs  LungDecreased breath sounds on the left but no rub or crackles heard, right has distant breath sounds  Abdomen: soft, nontender, nondistended.  No palpable organomegaly.  Extremities: Moderate and symmetric bilateral lower extremity edema with " no cords or calf tenderness  Skin: no rashes, lesions, bruising, or petechiae  Neuro: She is alert and answering questions more easily today with appropriate answers.  She moves all extremities.    RECENT LABS:   WBC   Date Value Ref Range Status   03/10/2019 14.37 (H) 4.80 - 10.80 10*3/mm3 Final     Hemoglobin   Date Value Ref Range Status   03/10/2019 12.3 12.0 - 16.0 g/dL Final     Hematocrit   Date Value Ref Range Status   03/10/2019 37.0 37.0 - 47.0 % Final     MCV   Date Value Ref Range Status   03/10/2019 87.7 82.0 - 98.0 fL Final     RDW   Date Value Ref Range Status   03/10/2019 21.0 (H) 12.0 - 15.0 % Final     MPV   Date Value Ref Range Status   03/10/2019 12.7 (H) 6.0 - 12.0 fL Final     Platelets   Date Value Ref Range Status   03/10/2019 155 130 - 400 10*3/mm3 Final     Immature Grans %   Date Value Ref Range Status   03/10/2019 2.1 0.0 - 5.0 % Final     Neutrophils, Absolute   Date Value Ref Range Status   03/10/2019 12.42 (H) 1.87 - 8.40 10*3/mm3 Final     Lymphocytes, Absolute   Date Value Ref Range Status   03/10/2019 0.30 (L) 0.72 - 4.86 10*3/mm3 Final     Monocytes, Absolute   Date Value Ref Range Status   03/10/2019 1.30 0.19 - 1.30 10*3/mm3 Final     Eosinophils, Absolute   Date Value Ref Range Status   03/10/2019 0.01 0.00 - 0.70 10*3/mm3 Final     Basophils, Absolute   Date Value Ref Range Status   03/10/2019 0.04 0.00 - 0.20 10*3/mm3 Final     Immature Grans, Absolute   Date Value Ref Range Status   03/10/2019 0.30 (H) 0.00 - 0.05 10*3/mm3 Final     nRBC   Date Value Ref Range Status   03/10/2019 0.4 (H) 0.0 - 0.0 /100 WBC Final       Glucose   Date Value Ref Range Status   03/10/2019 121 (H) 70 - 100 mg/dL Final     Sodium   Date Value Ref Range Status   03/10/2019 139 135 - 145 mmol/L Final     Potassium   Date Value Ref Range Status   03/10/2019 4.6 3.5 - 5.3 mmol/L Final     CO2   Date Value Ref Range Status   03/10/2019 27.0 24.0 - 31.0 mmol/L Final     Chloride   Date Value Ref Range  Status   03/10/2019 99 98 - 110 mmol/L Final     Anion Gap   Date Value Ref Range Status   03/10/2019 13.0 4.0 - 13.0 mmol/L Final     Creatinine   Date Value Ref Range Status   03/10/2019 1.18 0.50 - 1.40 mg/dL Final     BUN   Date Value Ref Range Status   03/10/2019 48 (H) 5 - 21 mg/dL Final     BUN/Creatinine Ratio   Date Value Ref Range Status   03/10/2019 40.7 (H) 7.0 - 25.0 Final     Calcium   Date Value Ref Range Status   03/10/2019 9.3 8.4 - 10.4 mg/dL Final     eGFR Non  Amer   Date Value Ref Range Status   03/10/2019 47 (L) >60 mL/min/1.73 Final     Alkaline Phosphatase   Date Value Ref Range Status   03/09/2019 465 (H) 24 - 120 U/L Final     Total Protein   Date Value Ref Range Status   03/09/2019 5.7 (L) 6.3 - 8.7 g/dL Final     ALT (SGPT)   Date Value Ref Range Status   03/09/2019 221 (H) 0 - 54 U/L Final     AST (SGOT)   Date Value Ref Range Status   03/09/2019 282 (H) 7 - 45 U/L Final     Total Bilirubin   Date Value Ref Range Status   03/09/2019 6.4 (H) 0.1 - 1.0 mg/dL Final     Albumin   Date Value Ref Range Status   03/09/2019 3.20 (L) 3.50 - 5.00 g/dL Final     Globulin   Date Value Ref Range Status   03/09/2019 2.5 gm/dL Final       No results found for: LDH, URICACID    No results found for: MSPIKE, KAPPALAMB, IGLFLC, FREEKAPPAL            Assessment/Plan   Impression: 1.  Stage IV non-small cell lung cancer.  She seems to be most symptomatic from her left lung obstruction, although she does have bone and liver metastasis.  So, she shows significant decline in her performance status the last all days, according to her history.  2.  Cystitis.  She is on ceftriaxone.  She may also have some element of post-obstruction pneumonia which hopefully would be covered by this same drug.    Plan: 1.  I'll defer to Dr. Erickson or his group about going ahead with the planned mediastinal irradiation.  This may offer some symptom relief but given her marked decline in performance status this may no  longer be appropriate.  2.  She'll continue on her current meds for supportive care and symptom control.  3.  A DNR order is in place which is certainly appropriate.                    Ilir Means MD   [facility] On[facility]    3/10/2019          CC:

## 2019-03-11 LAB
BACTERIA SPEC AEROBE CULT: ABNORMAL
BACTERIA SPEC AEROBE CULT: ABNORMAL
BH CV ECHO MEAS - AO MAX PG (FULL): 1.2 MMHG
BH CV ECHO MEAS - AO MAX PG: 4.2 MMHG
BH CV ECHO MEAS - AO MEAN PG (FULL): 1 MMHG
BH CV ECHO MEAS - AO MEAN PG: 3 MMHG
BH CV ECHO MEAS - AO ROOT AREA (BSA CORRECTED): 1.8
BH CV ECHO MEAS - AO ROOT AREA: 12.6 CM^2
BH CV ECHO MEAS - AO ROOT DIAM: 4 CM
BH CV ECHO MEAS - AO V2 MAX: 102 CM/SEC
BH CV ECHO MEAS - AO V2 MEAN: 76.9 CM/SEC
BH CV ECHO MEAS - AO V2 VTI: 20.2 CM
BH CV ECHO MEAS - AVA(I,A): 2.3 CM^2
BH CV ECHO MEAS - AVA(I,D): 2.3 CM^2
BH CV ECHO MEAS - AVA(V,A): 2.4 CM^2
BH CV ECHO MEAS - AVA(V,D): 2.4 CM^2
BH CV ECHO MEAS - BSA(HAYCOCK): 2.4 M^2
BH CV ECHO MEAS - BSA: 2.3 M^2
BH CV ECHO MEAS - BZI_BMI: 35 KILOGRAMS/M^2
BH CV ECHO MEAS - BZI_METRIC_HEIGHT: 177.8 CM
BH CV ECHO MEAS - BZI_METRIC_WEIGHT: 110.7 KG
BH CV ECHO MEAS - EDV(CUBED): 70.4 ML
BH CV ECHO MEAS - EDV(MOD-SP4): 39.8 ML
BH CV ECHO MEAS - EDV(TEICH): 75.5 ML
BH CV ECHO MEAS - EF(CUBED): 58.3 %
BH CV ECHO MEAS - EF(MOD-SP4): 57.5 %
BH CV ECHO MEAS - EF(TEICH): 50.4 %
BH CV ECHO MEAS - ESV(CUBED): 29.4 ML
BH CV ECHO MEAS - ESV(MOD-SP4): 16.9 ML
BH CV ECHO MEAS - ESV(TEICH): 37.5 ML
BH CV ECHO MEAS - FS: 25.3 %
BH CV ECHO MEAS - IVS/LVPW: 1.1
BH CV ECHO MEAS - IVSD: 1.3 CM
BH CV ECHO MEAS - LA DIMENSION: 3.2 CM
BH CV ECHO MEAS - LA/AO: 0.8
BH CV ECHO MEAS - LAT PEAK E' VEL: 3.3 CM/SEC
BH CV ECHO MEAS - LV DIASTOLIC VOL/BSA (35-75): 17.5 ML/M^2
BH CV ECHO MEAS - LV MASS(C)D: 194.5 GRAMS
BH CV ECHO MEAS - LV MASS(C)DI: 85.6 GRAMS/M^2
BH CV ECHO MEAS - LV MAX PG: 3 MMHG
BH CV ECHO MEAS - LV MEAN PG: 2 MMHG
BH CV ECHO MEAS - LV SYSTOLIC VOL/BSA (12-30): 7.4 ML/M^2
BH CV ECHO MEAS - LV V1 MAX: 86.5 CM/SEC
BH CV ECHO MEAS - LV V1 MEAN: 64.6 CM/SEC
BH CV ECHO MEAS - LV V1 VTI: 16.1 CM
BH CV ECHO MEAS - LVIDD: 4.1 CM
BH CV ECHO MEAS - LVIDS: 3.1 CM
BH CV ECHO MEAS - LVLD AP4: 6.1 CM
BH CV ECHO MEAS - LVLS AP4: 5.7 CM
BH CV ECHO MEAS - LVOT AREA (M): 2.8 CM^2
BH CV ECHO MEAS - LVOT AREA: 2.8 CM^2
BH CV ECHO MEAS - LVOT DIAM: 1.9 CM
BH CV ECHO MEAS - LVPWD: 1.3 CM
BH CV ECHO MEAS - MED PEAK E' VEL: 4.5 CM/SEC
BH CV ECHO MEAS - MV A MAX VEL: 102 CM/SEC
BH CV ECHO MEAS - MV DEC TIME: 0.1 SEC
BH CV ECHO MEAS - MV E MAX VEL: 60.6 CM/SEC
BH CV ECHO MEAS - MV E/A: 0.59
BH CV ECHO MEAS - RVDD: 3.6 CM
BH CV ECHO MEAS - SI(AO): 111.8 ML/M^2
BH CV ECHO MEAS - SI(CUBED): 18.1 ML/M^2
BH CV ECHO MEAS - SI(LVOT): 20.1 ML/M^2
BH CV ECHO MEAS - SI(MOD-SP4): 10.1 ML/M^2
BH CV ECHO MEAS - SI(TEICH): 16.7 ML/M^2
BH CV ECHO MEAS - SV(AO): 253.8 ML
BH CV ECHO MEAS - SV(CUBED): 41.1 ML
BH CV ECHO MEAS - SV(LVOT): 45.6 ML
BH CV ECHO MEAS - SV(MOD-SP4): 22.9 ML
BH CV ECHO MEAS - SV(TEICH): 38 ML
BH CV ECHO MEASUREMENTS AVERAGE E/E' RATIO: 15.54
LEFT ATRIUM VOLUME INDEX: 15 ML/M2
MAXIMAL PREDICTED HEART RATE: 161 BPM
STRESS TARGET HR: 137 BPM

## 2019-03-11 PROCEDURE — 97535 SELF CARE MNGMENT TRAINING: CPT

## 2019-03-11 PROCEDURE — 25010000002 MORPHINE PER 10 MG: Performed by: INTERNAL MEDICINE

## 2019-03-11 PROCEDURE — 94760 N-INVAS EAR/PLS OXIMETRY 1: CPT

## 2019-03-11 PROCEDURE — 97167 OT EVAL HIGH COMPLEX 60 MIN: CPT

## 2019-03-11 PROCEDURE — 94799 UNLISTED PULMONARY SVC/PX: CPT

## 2019-03-11 PROCEDURE — 97161 PT EVAL LOW COMPLEX 20 MIN: CPT | Performed by: PHYSICAL THERAPIST

## 2019-03-11 PROCEDURE — 25010000002 CEFTRIAXONE PER 250 MG: Performed by: INTERNAL MEDICINE

## 2019-03-11 PROCEDURE — 25010000002 DEXAMETHASONE PER 1 MG: Performed by: INTERNAL MEDICINE

## 2019-03-11 RX ORDER — MORPHINE SULFATE 20 MG/ML
2 SOLUTION ORAL
Status: DISCONTINUED | OUTPATIENT
Start: 2019-03-11 | End: 2019-03-13 | Stop reason: HOSPADM

## 2019-03-11 RX ADMIN — SODIUM CHLORIDE, PRESERVATIVE FREE 3 ML: 5 INJECTION INTRAVENOUS at 08:51

## 2019-03-11 RX ADMIN — MORPHINE SULFATE 4 MG: 4 INJECTION, SOLUTION INTRAMUSCULAR; INTRAVENOUS at 04:35

## 2019-03-11 RX ADMIN — MORPHINE SULFATE 4 MG: 4 INJECTION, SOLUTION INTRAMUSCULAR; INTRAVENOUS at 11:51

## 2019-03-11 RX ADMIN — DEXAMETHASONE SODIUM PHOSPHATE 8 MG: 4 INJECTION, SOLUTION INTRA-ARTICULAR; INTRALESIONAL; INTRAMUSCULAR; INTRAVENOUS; SOFT TISSUE at 17:39

## 2019-03-11 RX ADMIN — CEFTRIAXONE SODIUM 1 G: 1 INJECTION, POWDER, FOR SOLUTION INTRAMUSCULAR; INTRAVENOUS at 04:30

## 2019-03-11 RX ADMIN — DEXAMETHASONE SODIUM PHOSPHATE 8 MG: 4 INJECTION, SOLUTION INTRA-ARTICULAR; INTRALESIONAL; INTRAMUSCULAR; INTRAVENOUS; SOFT TISSUE at 05:57

## 2019-03-11 RX ADMIN — SODIUM CHLORIDE, PRESERVATIVE FREE 10 ML: 5 INJECTION INTRAVENOUS at 11:51

## 2019-03-11 RX ADMIN — MORPHINE SULFATE 2 MG: 20 SOLUTION ORAL at 17:55

## 2019-03-11 RX ADMIN — MORPHINE SULFATE 2 MG: 20 SOLUTION ORAL at 20:01

## 2019-03-11 NOTE — PLAN OF CARE
Problem: Patient Care Overview  Goal: Plan of Care Review  Outcome: Ongoing (interventions implemented as appropriate)   03/11/19 0512   Coping/Psychosocial   Plan of Care Reviewed With patient   Plan of Care Review   Progress no change   OTHER   Outcome Summary Pt rested some, cont to c/o of abd pain and generalized pain. Prn med given iv about every 4 hours. Tried to repostion every 2 hours pt refuses multiple times. Pt flat, orient to person and place confused to time. Very weak with poor appetite.        Problem: Fall Risk (Adult)  Goal: Absence of Fall  Outcome: Ongoing (interventions implemented as appropriate)      Problem: Skin Injury Risk (Adult)  Goal: Skin Health and Integrity  Outcome: Ongoing (interventions implemented as appropriate)      Problem: Nutrition, Imbalanced: Inadequate Oral Intake (Adult)  Goal: Improved Oral Intake  Outcome: Ongoing (interventions implemented as appropriate)      Problem: Pain, Acute (Adult)  Goal: Acceptable Pain Control/Comfort Level  Outcome: Ongoing (interventions implemented as appropriate)      Problem: Oncology Care (Adult)  Goal: Signs and Symptoms of Listed Potential Problems Will be Absent, Minimized or Managed (Oncology Care)  Outcome: Ongoing (interventions implemented as appropriate)

## 2019-03-11 NOTE — PROGRESS NOTES
[facility] Medicine Services  INPATIENT PROGRESS NOTE    Patient Name: Cary Ferris  Date of Admission: 3/8/2019  Today's Date: 03/11/19  Length of Stay: 2  Primary Care Physician: Aide Garces DO    Subjective   Chief Complaint: wants the wires off of them.   HPI   No nausea, chest pain,   Breathing ok  Wants to go home and sit in recliner.  Friend at bedside.     Review of Systems     All pertinent negatives and positives are as above. All other systems have been reviewed and are negative unless otherwise stated.     Objective    Temp:  [97.5 °F (36.4 °C)-98.2 °F (36.8 °C)] 97.8 °F (36.6 °C)  Heart Rate:  [] 102  Resp:  [18-20] 18  BP: (137-151)/(80-98) 151/98  Physical Exam   Constitutional: She appears well-developed and well-nourished.   HENT:   Head: Normocephalic and atraumatic.   Eyes: EOM are normal. Pupils are equal, round, and reactive to light.   Neck: Neck supple. No JVD present.   Cardiovascular: Normal rate, regular rhythm, normal heart sounds and intact distal pulses.   Pulmonary/Chest: Effort normal and breath sounds normal.   Abdominal: Soft. Bowel sounds are normal.   Musculoskeletal: Normal range of motion. She exhibits edema.   Neurological:   Awake.  Answers to name.  Fixated on going home.    Skin: Skin is warm and dry. There is pallor.   Psychiatric: Her behavior is normal.   Nursing note and vitals reviewed.          Results Review:  I have reviewed the labs, radiology results, and diagnostic studies.    Laboratory Data:   Results from last 7 days   Lab Units 03/10/19  0459 03/09/19  0521 03/09/19  0336   WBC 10*3/mm3 14.37* 15.98* 15.94*   HEMOGLOBIN g/dL 12.3 12.1 12.5   HEMATOCRIT % 37.0 36.6* 37.2   PLATELETS 10*3/mm3 155 176 187        Results from last 7 days   Lab Units 03/10/19  0459 03/09/19  0521 03/09/19  0336 03/08/19  2246 03/07/19  1004   SODIUM mmol/L 139 138 137 138 139   POTASSIUM mmol/L 4.6 4.7 4.8 5.0 4.5    CHLORIDE mmol/L 99 98 99 100 100   CO2 mmol/L 27.0 23.0* 21.0* 23.0* 25.0   BUN mg/dL 48* 48* 49* 48* 37*   CREATININE mg/dL 1.18 1.21 1.16 1.12 1.05   CALCIUM mg/dL 9.3 9.6 9.6 9.4 9.3   BILIRUBIN mg/dL  --  6.4*  --  6.2* 4.4*   ALK PHOS U/L  --  465*  --  508* 525*   ALT (SGPT) U/L  --  221*  --  236* 219*   AST (SGOT) U/L  --  282*  --  308* 225*   GLUCOSE mg/dL 121* 100 100 118* 97       Culture Data:   Urine Culture   Date Value Ref Range Status   03/09/2019 50,000-60,000 CFU/mL Escherichia coli (A)  Final   03/09/2019 10,000-20,000 CFU/mL Mixed Gram Positive Georgina (A)  Final     Comment:     Probable contaminant.         Radiology Data:   Imaging Results (last 24 hours)     ** No results found for the last 24 hours. **          I have reviewed the patient's current medications.     Assessment/Plan     Active Hospital Problems    Diagnosis   • Failure to thrive in adult       Assessment    Lung cancer, poorly differentiated high grade carcinoma with mets  Left lung opacification/colapse  COPD severe  afib  Hx DVT  Failure to thrive in adult secondary to know cancer    Plan    Hospice cosult ordered by Oncology  [facility]  Remove SaO2 machine  Add oral morphine concentrate.  anticipate transition home when hospice set in place.   Discussed with friend and patient.          Discharge Planning: I expect the patient to be discharged to home with hospice in 1-2 days.    Alicia Jaquez DO   03/11/19   4:32 PM

## 2019-03-11 NOTE — THERAPY EVALUATION
Acute Care - Occupational Therapy Initial Evaluation  [facility]     Patient Name: Cary Ferris  : 1960  MRN: [id number]  Today's Date: 3/11/2019  Onset of Illness/Injury or Date of Surgery: 19  Date of Referral to OT: 19  Referring Physician: Dr. Luong    Admit Date: 3/8/2019       ICD-10-CM ICD-9-CM   1. Failure to thrive in adult R62.7 783.7   2. NSTEMI (non-ST elevated myocardial infarction) (CMS/HCC) I21.4 410.70   3. Impaired mobility and ADLs Z74.09 799.89     Patient Active Problem List   Diagnosis   • Small cell lung cancer (CMS/HCC)   • S/P radiation therapy   • Morbid obesity due to excess calories (CMS/HCC)   • Ureterolithiasis   • Former smoker   • Personal history of nicotine dependence   • Chronic pain syndrome   • Seasonal allergic rhinitis due to pollen   • Chronic systolic (congestive) heart failure (CMS/HCC)   • Recurrent pleural effusion on left   • Endobronchial mass   • Non-small cell cancer of left lung (CMS/HCC)   • Failure to thrive in adult   • Encounter for consultation     Past Medical History:   Diagnosis Date   • Adenocarcinoma of lung (CMS/HCC)    • Anemia     d/t chemotherapy   • Anxiety    • Atrial fibrillation (CMS/HCC)    • Chronic pain    • COPD (chronic obstructive pulmonary disease) (CMS/HCC)    • Coronary artery disease    • Depression    • DVT (deep venous thrombosis) (CMS/HCC)     left leg, in past   • Endobronchial mass 2019   • Generalized headaches    • High serum carcinoembryonic antigen (CEA)    • History of radiation therapy     6300 cGy to lung completed 2014; prophylactic brain radiation 2600 cGy completed 2015   • Hx of degenerative disc disease    • Hyperglycemia    • Hyperlipidemia    • Hypertension    • Meningitis    • Morbid obesity (CMS/HCC)    • Nephrolithiasis    • Non-small cell cancer of left lung (CMS/HCC) 2019   • Osteoarthritis    • Oxygen desaturation during sleep     pt states oxygen company came and  got oxygen ,did not qualify for usage   • Personal history of chemotherapy     For Lung Cancer   • Recurrent pleural effusion on left 2/18/2019     Past Surgical History:   Procedure Laterality Date   • BRONCHOSCOPY     • CARDIOVERSION      2014   • COLONOSCOPY     • KNEE SURGERY Left     x 3   • PARTIAL HYSTERECTOMY  12/2003   • PORTACATH PLACEMENT Left    • SKIN LESION EXCISION      Negative for malignacy   • TONSILLECTOMY     • URETEROSCOPY LASER LITHOTRIPSY WITH STENT INSERTION Right 5/21/2018    Procedure: CYSTO, URETEROSCOPY LASER LITHOTRIPSY WITH STENT INSERTION, RETROGRADE PYELOGRAM,;  Surgeon: Grzegorz Landaverde MD;  Location: [facility] OR;  Service: Urology          OT ASSESSMENT FLOWSHEET (last 72 hours)      Occupational Therapy Evaluation     Row Name 03/11/19 1051                   OT Evaluation Time/Intention    Subjective Information  weakness;fatigue;pain  -MW        Document Type  evaluation  -MW        Mode of Treatment  occupational therapy  -MW           General Information    Patient Profile Reviewed?  yes  -MW        Onset of Illness/Injury or Date of Surgery  03/08/19  -MW        Referring Physician  Dr. Luong  -MW        Patient Observations  alert;cooperative;agree to therapy  -MW        Patient/Family Observations  friend present  -MW        General Observations of Patient  awake and alert in fowlers, O2 nc, flat affect  -MW        Prior Level of Function  min assist:;dressing;bathing;mod assist:;transfer  -MW        Equipment Currently Used at Home  commode, bedside;wheelchair;power chair, (recliner lift);rollator;oxygen;bath bench  -MW        Pertinent History of Current Functional Problem  hx stage IV lung cancer, presented to ED with increased LE edema, weakness, uncontrolled pain. Dx: failure to thrive, L lung obstruction.  -MW        Existing Precautions/Restrictions  fall;oxygen therapy device and L/min  -MW        Risks Reviewed  patient and  family:;LOB;nausea/vomiting;dizziness;increased discomfort;change in vital signs;increased drainage;lines disloged  -        Benefits Reviewed  patient and family:;improve function;increase independence;increase strength;increase balance;decrease pain;decrease risk of DVT;improve skin integrity;increase knowledge  -        Barriers to Rehab  previous functional deficit;medically complex  -MW           Relationship/Environment    Name of Support/Comfort Primary Source  friend and sibling assist  -MW        Lives With  sibling(s);other (see comments) friend  -MW           Resource/Environmental Concerns    Current Living Arrangements  home/apartment/condo  -MW           Home Main Entrance    Number of Stairs, Main Entrance  one  -MW        Stair Railings, Main Entrance  railings on both sides of stairs  -           Cognitive Assessment/Interventions    Additional Documentation  Cognitive Assessment/Intervention (Group)  -           Cognitive Assessment/Intervention- PT/OT    Affect/Mental Status (Cognitive)  flat/blunted affect  -        Orientation Status (Cognition)  oriented to;person did not further assess  -MW        Follows Commands (Cognition)  follows one step commands;75-90% accuracy;delayed response/completion;increased processing time needed;repetition of directions required;verbal cues/prompting required  -        Safety Deficit (Cognitive)  ability to follow commands;problem solving  -        Personal Safety Interventions  fall prevention program maintained;gait belt;elopement precautions initiated;nonskid shoes/slippers when out of bed;supervised activity  -MW           Safety Issues, Functional Mobility    Safety Issues Affecting Function (Mobility)  ability to follow commands;problem solving  -        Impairments Affecting Function (Mobility)  balance;cognition;endurance/activity tolerance;pain;shortness of breath;strength  -MW           Bed Mobility Assessment/Treatment    Bed Mobility  Assessment/Treatment  rolling left;rolling right;scooting/bridging;supine-sit;sit-supine  -MW        Rolling Left [place] (Bed Mobility)  contact guard;verbal cues  -MW        Rolling Right [place] (Bed Mobility)  contact guard;verbal cues  -MW        Scooting/Bridging [place] (Bed Mobility)  maximum assist (25% patient effort);2 person assist;verbal cues  -MW        Supine-Sit [place] (Bed Mobility)  minimum assist (75% patient effort);verbal cues  -MW        Sit-Supine [place] (Bed Mobility)  minimum assist (75% patient effort);verbal cues  -MW        Bed Mobility, Safety Issues  decreased use of arms for pushing/pulling;decreased use of legs for bridging/pushing  -MW        Assistive Device (Bed Mobility)  bed rails;draw sheet;head of bed elevated  -        Comment (Bed Mobility)  significantly increased time required due to delayed processing and decreased strength  -           Functional Mobility    Functional Mobility- Ind. Level  unable to perform  -        Functional Mobility- Comment  sit<>stand only, unable to take steps   -           Transfer Assessment/Treatment    Transfer Assessment/Treatment  sit-stand transfer;stand-sit transfer  -MW           Sit-Stand Transfer    Sit-Stand [place] (Transfers)  minimum assist (75% patient effort);2 person assist;verbal cues  -MW           Stand-Sit Transfer    Stand-Sit [place] (Transfers)  minimum assist (75% patient effort)  -MW           ADL Assessment/Intervention    BADL Assessment/Intervention  lower body dressing  -           Lower Body Dressing Assessment/Training    Lower Body Dressing [place] Level  don;doff;socks;dependent (less than 25% patient effort)  -MW        Lower Body Dressing Position  edge of bed sitting  -        Comment (Lower Body Dressing)  difficulty orienting pt to task, does not attempt task for self  -MW           BADL Safety/Performance    Impairments, BADL Safety/Performance   balance;cognition;endurance/activity tolerance;pain;strength;shortness of breath  -MW           General ROM    GENERAL ROM COMMENTS  AROM WFL BUE, no formally assessed  -MW           MMT (Manual Muscle Testing)    General MMT Comments  BUE functionally 4-/5  -MW           Motor Assessment/Interventions    Additional Documentation  Balance (Group)  -MW           Balance    Balance  static sitting balance;static standing balance  -MW           Static Sitting Balance    Level of [place] (Unsupported Sitting, Static Balance)  standby assist  -MW        Sitting Position (Unsupported Sitting, Static Balance)  sitting on edge of bed  -MW           Static Standing Balance    Level of [place] (Supported Standing, Static Balance)  contact guard assist;minimal assist, 75% patient effort;2 person assist  -MW        Comment (Supported Standing, Static Balance)  HHAx2 provided in standing  -MW           Sensory Assessment/Intervention    Sensory General Assessment  other (see comments) reports decreased sensation in B hands  -MW           Positioning and Restraints    Pre-Treatment Position  in bed  -MW        Post Treatment Position  bed  -MW        In Bed  fowlers;call light within reach;encouraged to call for assist;exit alarm on;with family/caregiver;side rails up x2;legs elevated  -MW           Pain Assessment    Additional Documentation  Pain Scale: Numbers Pre/Post-Treatment (Group)  -MW           Pain Scale: Numbers Pre/Post-Treatment    Pain Scale: Numbers, Pretreatment  10/10  -MW        Pain Scale: Numbers, Post-Treatment  -- no reported change  -MW        Pain Location  abdomen  -MW        Pain Intervention(s)  Medication (See MAR);Repositioned;Ambulation/increased activity  -MW           Wound 03/09/19 0330 Left labia blisters    Wound - Properties Group Date first assessed: 03/09/19  -AD Time first assessed: 0330 -AD Present On Admission : yes  -AD Side: Left  -AD Location: labia  -AD Type: blisters  -AD        Wound 03/09/19 0330 Bilateral gluteal MASD (moisture associated skin damage)    Wound - Properties Group Date first assessed: 03/09/19  -AD Time first assessed: 0330  -AD Side: Bilateral  -AD Location: gluteal  -AD Type: MASD (moisture associated skin damage)  -AD       Plan of Care Review    Plan of Care Reviewed With  patient;friend  -           Clinical Impression (OT)    Date of Referral to OT  03/09/19  -MW        OT Diagnosis  decreased ADL  -MW        Prognosis (OT Eval)  fair  -MW        Patient/Family Goals Statement (OT Eval)  increase independence with ADL and functional transfers  -        Criteria for Skilled Therapeutic Interventions Met (OT Eval)  yes;treatment indicated  -        Rehab Potential (OT Eval)  fair, will monitor progress closely  -        Therapy Frequency (OT Eval)  3 times/wk  -        Predicted Duration of Therapy Intervention (Therapy Eval)  until hospital d/c  -MW        Care Plan Review (OT)  evaluation/treatment results reviewed;care plan/treatment goals reviewed;risks/benefits reviewed;current/potential barriers reviewed;patient/other agree to care plan  -        Anticipated Discharge Disposition (OT)  other (see comments) home w hospice  -           Planned OT Interventions    Planned Therapy Interventions (OT Eval)  activity tolerance training;BADL retraining;functional balance retraining;occupation/activity based interventions;patient/caregiver education/training;strengthening exercise;transfer/mobility retraining  -           OT Goals    Transfer Goal Selection (OT)  transfer, OT goal 1  -MW        Toileting Goal Selection (OT)  toileting, OT goal 1  -MW           Transfer Goal 1 (OT)    Activity/Assistive Device (Transfer Goal 1, OT)  sit-to-stand/stand-to-sit;bed-to-chair/chair-to-bed;commode, bedside without drop arms;shower chair  -        [place] Level/Cues Needed (Transfer Goal 1, OT)  contact guard assist  -        Time Frame (Transfer  Goal 1, OT)  long term goal (LTG);by discharge  -MW        Progress/Outcome (Transfer Goal 1, OT)  goal ongoing  -MW           Toileting Goal 1 (OT)    Activity/Device (Toileting Goal 1, OT)  toileting skills, all;commode, bedside without drop arms  -MW        [place] Level/Cues Needed (Toileting Goal 1, OT)  moderate assist (50-74% patient effort)  -MW        Time Frame (Toileting Goal 1, OT)  long term goal (LTG);by discharge  -MW        Progress/Outcome (Toileting Goal 1, OT)  goal ongoing  -MW           Living Environment    Home Accessibility  stairs to enter home walk in and tub, was not using  -          User Key  (r) = Recorded By, (t) = Taken By, (c) = Cosigned By    Initials Name Effective Dates    AD Federica Lozano RN 08/02/16 -     Lynnette Ma, OTR/L 08/28/18 -          Occupational Therapy Education     Title: PT OT SLP Therapies (Done)     Topic: Occupational Therapy (Done)     Point: ADL training (Done)     Description: Instruct learner(s) on proper safety adaptation and remediation techniques during self care or transfers.   Instruct in proper use of assistive devices.    Learning Progress Summary           Patient Acceptance, E, VU by  at 3/11/2019 12:03 PM    Comment:  ADL,  transfers, bed mobility, benefit of increased activity and upright sitting, pursed lip breathing, OT POC                               User Key     Initials Effective Dates Name Provider Type Discipline     08/28/18 -  Lynnette Johns, OTR/L Occupational Therapist OT                  OT Recommendation and Plan  Outcome Summary/Treatment Plan (OT)  Anticipated Discharge Disposition (OT): other (see comments)(home w hospice)  Planned Therapy Interventions (OT Eval): activity tolerance training, BADL retraining, functional balance retraining, occupation/activity based interventions, patient/caregiver education/training, strengthening exercise, transfer/mobility retraining  Therapy Frequency (OT Eval): 3  times/wk  Plan of Care Review  Plan of Care Reviewed With: patient, friend  Plan of Care Reviewed With: patient, friend  Outcome Summary: OT eval completed. Pt with flat affect, delayed processing and increased time required to follow commands throughout eval. Comes to EOB with Marilee and significantly increased time, draw sheet required to square hips to EOB. Dep for LB dressing, unable to initiate task. x2 attempts to stand from EOB but unable to clear bottom from bed, with surface elevated stands minAx2. Returned to supine [place] and rolls L/R CGA w vc for bed pan. Skilled OT required to increase independence and safety with ADL and functional transfers. Notes indicate plans for hosp[facility] at [facility]    Outcome Measures     Row Name 03/11/19 1100             How much help from another is currently needed...    Putting on and taking off regular lower body clothing?  1  -MW      Bathing (including washing, rinsing, and drying)  2  -MW      Toileting (which includes using toilet bed pan or urinal)  2  -MW      Putting on and taking off regular upper body clothing  2  -MW      Taking care of personal grooming (such as brushing teeth)  2  -MW      Eating meals  3  -MW      Score  12  -MW         Functional Assessment    Outcome Measure Options  AM-PAC 6 Clicks Daily Activity (OT)  -MW        User Key  (r) = Recorded By, (t) = Taken By, (c) = Cosigned By    Initials Name Provider Type    Lynnette Ma, OTR/L Occupational Therapist          Time Calculation:   Time Calculation- OT     Row Name 03/11/19 1403             Time Calculation- OT    OT Start Time  1046 +23 min chart review  -MW      OT Stop Time  1137  -MW      OT Time Calculation (min)  51 min  -MW      OT Received On  03/11/19  -      OT Goal Re-Cert Due Date  03/21/19  -        User Key  (r) = Recorded By, (t) = Taken By, (c) = Cosigned By    Initials Name Provider Type    Lynnette Ma, OTR/L Occupational Therapist        Therapy Suggested Charges      Code   Minutes Charges    None           Therapy Charges for Today     Code Description Service Date Service Provider Modifiers Qty    [id number]  OT EVAL HIGH COMPLEXITY 4 3/11/2019 Lynnette Johns OTR/L GO 1    [id number]  OT SELF CARE/MGMT/TRAIN EA 15 MIN 3/11/2019 Lynnette Johns OTR/L GO 1               JOSE FRANCISCO Frances/L  3/11/2019

## 2019-03-11 NOTE — PROGRESS NOTES
Palliative care consulted on March 9, 2019 for goals of care conversation.  According to chart review patient has elected to transition to hospice this AM and Dr. Flowers has placed a hospice consult.  We will cancel this palliative consult, please reconsult for symptom management needs. We will be happy to see the patient and assist as needed.    Anamika Marshall, LATONIA  Palliative care

## 2019-03-11 NOTE — THERAPY EVALUATION
Acute Care - Physical Therapy Initial Evaluation  [facility]     Patient Name: Cary Ferris  : 1960  MRN: [id number]  Today's Date: 3/11/2019   Onset of Illness/Injury or Date of Surgery: 19  Date of Referral to PT: 19  Referring Physician: Dr. Luong      Admit Date: 3/8/2019    Visit Dx:     ICD-10-CM ICD-9-CM   1. Failure to thrive in adult R62.7 783.7   2. NSTEMI (non-ST elevated myocardial infarction) (CMS/HCC) I21.4 410.70   3. Impaired mobility and ADLs Z74.09 799.89   4. Impaired functional mobility and activity tolerance Z74.09 V49.89     Patient Active Problem List   Diagnosis   • Small cell lung cancer (CMS/HCC)   • S/P radiation therapy   • Morbid obesity due to excess calories (CMS/HCC)   • Ureterolithiasis   • Former smoker   • Personal history of nicotine dependence   • Chronic pain syndrome   • Seasonal allergic rhinitis due to pollen   • Chronic systolic (congestive) heart failure (CMS/HCC)   • Recurrent pleural effusion on left   • Endobronchial mass   • Non-small cell cancer of left lung (CMS/HCC)   • Failure to thrive in adult   • Encounter for consultation     Past Medical History:   Diagnosis Date   • Adenocarcinoma of lung (CMS/HCC)    • Anemia     d/t chemotherapy   • Anxiety    • Atrial fibrillation (CMS/HCC)    • Chronic pain    • COPD (chronic obstructive pulmonary disease) (CMS/HCC)    • Coronary artery disease    • Depression    • DVT (deep venous thrombosis) (CMS/HCC)     left leg, in past   • Endobronchial mass 2019   • Generalized headaches    • High serum carcinoembryonic antigen (CEA)    • History of radiation therapy     6300 cGy to lung completed 2014; prophylactic brain radiation 2600 cGy completed 2015   • Hx of degenerative disc disease    • Hyperglycemia    • Hyperlipidemia    • Hypertension    • Meningitis    • Morbid obesity (CMS/HCC)    • Nephrolithiasis    • Non-small cell cancer of left lung (CMS/HCC) 2019   •  Osteoarthritis    • Oxygen desaturation during sleep     pt states oxygen company came and got oxygen ,did not qualify for usage   • Personal history of chemotherapy     For Lung Cancer   • Recurrent pleural effusion on left 2/18/2019     Past Surgical History:   Procedure Laterality Date   • BRONCHOSCOPY     • CARDIOVERSION      2014   • COLONOSCOPY     • KNEE SURGERY Left     x 3   • PARTIAL HYSTERECTOMY  12/2003   • PORTACATH PLACEMENT Left    • SKIN LESION EXCISION      Negative for malignacy   • TONSILLECTOMY     • URETEROSCOPY LASER LITHOTRIPSY WITH STENT INSERTION Right 5/21/2018    Procedure: CYSTO, URETEROSCOPY LASER LITHOTRIPSY WITH STENT INSERTION, RETROGRADE PYELOGRAM,;  Surgeon: Grzegorz Landaverde MD;  Location: [facility] OR;  Service: Urology        PT ASSESSMENT (last 12 hours)      Physical Therapy Evaluation     Row Name 03/11/19 1052          PT Evaluation Time/Intention    Subjective Information  complains of;weakness;fatigue;pain  -SB     Document Type  evaluation  -SB     Mode of Treatment  physical therapy  -SB     Patient Effort  good  -SB     Symptoms Noted During/After Treatment  fatigue;dizziness  -SB     Row Name 03/11/19 1052          General Information    Patient Profile Reviewed?  yes  -SB     Onset of Illness/Injury or Date of Surgery  03/08/19  -SB     Referring Physician  Dr. Luong  -SB     Patient Observations  alert;cooperative;agree to therapy  -SB     Patient/Family Observations  friend present  -SB     General Observations of Patient  awake and alert  -SB     Prior Level of Function  mod assist:;transfer  -SB     Equipment Currently Used at Home  commode, bedside;wheelchair;power chair, (recliner lift);rollator;oxygen;bath bench  -SB     Pertinent History of Current Functional Problem  hx stage IV lung cancer, presented to ED with increased LE edema, weakness, uncontrolled pain. Dx: failure to thrive, L lung obstruction.  -SB     Existing Precautions/Restrictions  fall;oxygen  therapy device and L/min  -SB     Risks Reviewed  patient:;LOB;nausea/vomiting;dizziness;increased discomfort;change in vital signs;increased drainage;lines disloged  -SB     Benefits Reviewed  patient:;improve function;increase independence;increase strength;increase balance;decrease pain;decrease risk of DVT;improve skin integrity;increase knowledge  -SB     Barriers to Rehab  previous functional deficit;medically complex  -SB     Row Name 03/11/19 1052          Relationship/Environment    Name of Support/Comfort Primary Source  friend and sibling assist  -SB     Lives With  sibling(s);friend(s)  -SB     Family Caregiver if Needed  friend(s)  -SB     Row Name 03/11/19 1052          Resource/Environmental Concerns    Current Living Arrangements  home/apartment/condo  -SB     Resource/Environmental Concerns  none  -SB     Transportation Concerns  car, none  -SB     Row Name 03/11/19 1052          Home Main Entrance    Number of Stairs, Main Entrance  one  -SB     Stair Railings, Main Entrance  railings on both sides of stairs  -SB     Row Name 03/11/19 1052          Cognitive Assessment/Interventions    Additional Documentation  Cognitive Assessment/Intervention (Group)  -SB     Row Name 03/11/19 1052          Cognitive Assessment/Intervention- PT/OT    Affect/Mental Status (Cognitive)  flat/blunted affect  -SB     Orientation Status (Cognition)  oriented to;person  -SB     Follows Commands (Cognition)  follows one step commands;75-90% accuracy;delayed response/completion;increased processing time needed;repetition of directions required;verbal cues/prompting required  -SB     Cognitive Function (Cognitive)  WFL  -SB     Safety Deficit (Cognitive)  ability to follow commands;problem solving  -SB     Personal Safety Interventions  fall prevention program maintained;gait belt;muscle strengthening facilitated;nonskid shoes/slippers when out of bed;supervised activity  -SB     Row Name 03/11/19 1052          Safety  Issues, Functional Mobility    Safety Issues Affecting Function (Mobility)  ability to follow commands;problem solving  -SB     Impairments Affecting Function (Mobility)  balance;cognition;endurance/activity tolerance;pain;shortness of breath;strength  -SB     Row Name 03/11/19 1052          Bed Mobility Assessment/Treatment    Bed Mobility Assessment/Treatment  rolling left;rolling right;scooting/bridging;supine-sit;sit-supine  -SB     Rolling Left [place] (Bed Mobility)  contact guard;verbal cues  -SB     Rolling Right [place] (Bed Mobility)  contact guard;verbal cues  -SB     Scooting/Bridging [place] (Bed Mobility)  maximum assist (25% patient effort);2 person assist;verbal cues  -SB     Supine-Sit [place] (Bed Mobility)  minimum assist (75% patient effort);verbal cues  -SB     Sit-Supine [place] (Bed Mobility)  minimum assist (75% patient effort);verbal cues  -SB     Bed Mobility, Safety Issues  decreased use of arms for pushing/pulling;decreased use of legs for bridging/pushing  -SB     Assistive Device (Bed Mobility)  bed rails;draw sheet;head of bed elevated  -SB     Comment (Bed Mobility)  significantly increased time required due to delayed processing and decreased strength  -SB     Row Name 03/11/19 1052          Transfer Assessment/Treatment    Transfer Assessment/Treatment  sit-stand transfer;stand-sit transfer  -SB     Sit-Stand [place] (Transfers)  minimum assist (75% patient effort);2 person assist;verbal cues  -SB     Stand-Sit [place] (Transfers)  minimum assist (75% patient effort)  -SB     Row Name 03/11/19 1052          Gait/Stairs Assessment/Training    Comment (Gait/Stairs)  not performed due to fatigue  -SB     Row Name 03/11/19 1052          General ROM    GENERAL ROM COMMENTS  BLE WFL  -SB     Row Name 03/11/19 1052          MMT (Manual Muscle Testing)    General MMT Comments  BLE 4-/5  -SB     Row Name 03/11/19 1052          Pain Scale: Numbers  Pre/Post-Treatment    Pain Scale: Numbers, Pretreatment  10/10  -SB     Pain Location  abdomen  -SB     Pain Intervention(s)  Repositioned;Ambulation/increased activity  -SB     Row Name             Wound 03/09/19 0330 Left labia blisters    Wound - Properties Group Date first assessed: 03/09/19  -AD Time first assessed: 0330  -AD Present On Admission : yes  -AD Side: Left  -AD Location: labia  -AD Type: blisters  -AD    Row Name             Wound 03/09/19 0330 Bilateral gluteal MASD (moisture associated skin damage)    Wound - Properties Group Date first assessed: 03/09/19  -AD Time first assessed: 0330  -AD Side: Bilateral  -AD Location: gluteal  -AD Type: MASD (moisture associated skin damage)  -AD    Row Name             [REMOVED] Wound 05/21/18 0818 Other (See comments) perirectal incision    Wound - Properties Group Date first assessed: 05/21/18  -HT Time first assessed: 0818  -HT Side: Other (See comments)  -HT Location: perirectal  -HT Type: incision  -HT Additional Comments: Not present on admission  -BS Resolution Date: 03/11/19  -BS Resolution Time: 1446  -BS    Row Name             [REMOVED] Wound 05/21/18 0840 Other (See comments) perineum incision    Wound - Properties Group Date first assessed: 05/21/18  -HT Time first assessed: 0840  -HT Side: Other (See comments)  -HT Location: perineum  -HT Type: incision  -HT Additional Comments: Not present on admission  -BS Resolution Date: 03/11/19  -BS Resolution Time: 1447  -BS    Row Name 03/11/19 1052          Plan of Care Review    Plan of Care Reviewed With  patient  -SB     Row Name 03/11/19 1052          Physical Therapy Clinical Impression    Date of Referral to PT  03/09/19  -SB     Criteria for Skilled Interventions Met (PT Clinical Impression)  yes  -SB     Rehab Potential (PT Clinical Summary)  fair, will monitor progress closely  -SB     Predicted Duration of Therapy (PT)  until d/c  -SB     Care Plan Review (PT)  evaluation/treatment results  reviewed;care plan/treatment goals reviewed;current/potential barriers reviewed;risks/benefits reviewed;patient/other agree to care plan  -SB     Care Plan Review, Other Participant (PT Clinical Impression)  friend  -SB     Row Name 03/11/19 1052          Physical Therapy Goals    Bed Mobility Goal Selection (PT)  bed mobility, PT goal 1  -SB     Transfer Goal Selection (PT)  transfer, PT goal 1;transfer, PT goal 2  -SB     Gait Training Goal Selection (PT)  gait training, PT goal 1  -SB     Row Name 03/11/19 1052          Bed Mobility Goal 1 (PT)    Activity/Assistive Device (Bed Mobility Goal 1, PT)  bed mobility activities, all  -SB     [place] Level/Cues Needed (Bed Mobility Goal 1, PT)  supervision required  -SB     Time Frame (Bed Mobility Goal 1, PT)  by discharge  -SB     Progress/Outcomes (Bed Mobility Goal 1, PT)  goal ongoing  -SB     Row Name 03/11/19 1052          Transfer Goal 1 (PT)    Activity/Assistive Device (Transfer Goal 1, PT)  sit-to-stand/stand-to-sit;walker, rolling  -SB     [place] Level/Cues Needed (Transfer Goal 1, PT)  contact guard assist  -SB     Time Frame (Transfer Goal 1, PT)  by discharge  -SB     Progress/Outcome (Transfer Goal 1, PT)  goal ongoing  -SB     Row Name 03/11/19 1052          Transfer Goal 2 (PT)    Activity/Assistive Device (Transfer Goal 2, PT)  bed-to-chair/chair-to-bed;walker, rolling  -SB     [place] Level/Cues Needed (Transfer Goal 2, PT)  minimum assist (75% or more patient effort)  -SB     Time Frame (Transfer Goal 2, PT)  by discharge  -SB     Progress/Outcome (Transfer Goal 2, PT)  goal ongoing  -SB     Row Name 03/11/19 1052          Positioning and Restraints    Pre-Treatment Position  in bed  -SB     Post Treatment Position  bed  -SB     In Bed  fowlers;call light within reach;encouraged to call for assist;exit alarm on;with family/caregiver  -SB     Row Name 03/11/19 1052          Living Environment    Home Accessibility  stairs to enter  home  -SB       User Key  (r) = Recorded By, (t) = Taken By, (c) = Cosigned By    Initials Name Provider Type    BS Nerissa Buckley, RN Registered Nurse    Federica Cook, RN Registered Nurse    Citlalli Hastings RN Registered Nurse    Suzette Coe, PT Physical Therapist        Physical Therapy Education     Title: PT OT SLP Therapies (Done)     Topic: Physical Therapy (Done)     Point: Mobility training (Done)     Learning Progress Summary           Patient Acceptance, E, VU by SB at 3/11/2019  4:34 PM    Comment:  pt educated on POC, benefits of activity and d/c plans                   Point: Body mechanics (Done)     Learning Progress Summary           Patient Acceptance, E, VU by SB at 3/11/2019  4:34 PM    Comment:  pt educated on POC, benefits of activity and d/c plans                   Point: Precautions (Done)     Learning Progress Summary           Patient Acceptance, E, VU by SB at 3/11/2019  4:34 PM    Comment:  pt educated on POC, benefits of activity and d/c plans                               User Key     Initials Effective Dates Name Provider Type Discipline     08/31/18 -  Suzette Muhammad, PT Physical Therapist PT              PT Recommendation and Plan  Anticipated Discharge Disposition (PT): (hospice per notes)  Planned Therapy Interventions (PT Eval): balance training, bed mobility training, home exercise program, patient/family education, ROM (range of motion), strengthening, transfer training  Therapy Frequency (PT Clinical Impression): daily  Outcome Summary/Treatment Plan (PT)  Anticipated Discharge Disposition (PT): (hospice per notes)  Plan of Care Reviewed With: patient  Progress: no change(eval day)  Outcome Summary: PT eval completed. Pt with hyperfixation on attached lines throughout eval and needed increased time for processing commands. Pt also with very significant fatigue throughout the session and needed increased time to perform all activities. Pt performed sup to  sit with min A and sit to stand with min Ax2 and elevated bed. Pt unable to do more than stand because of fatigue levels. Pt with weakness in BLE and overall decreased activity tolerance that will benefit from skilled PT. Anticipate hospice care at d/c per notes.   Outcome Measures     Row Name 03/11/19 1500 03/11/19 1100          How much help from another person do you currently need...    Turning from your back to your side while in flat bed without using bedrails?  3  -SB  --     Moving from lying on back to sitting on the side of a flat bed without bedrails?  3  -SB  --     Moving to and from a bed to a chair (including a wheelchair)?  2  -SB  --     Standing up from a chair using your arms (e.g., wheelchair, bedside chair)?  3  -SB  --     Climbing 3-5 steps with a railing?  1  -SB  --     To walk in hospital room?  1  -SB  --     AM-PAC 6 Clicks Score  13  -SB  --        How much help from another is currently needed...    Putting on and taking off regular lower body clothing?  --  1  -MW     Bathing (including washing, rinsing, and drying)  --  2  -MW     Toileting (which includes using toilet bed pan or urinal)  --  2  -MW     Putting on and taking off regular upper body clothing  --  2  -MW     Taking care of personal grooming (such as brushing teeth)  --  2  -MW     Eating meals  --  3  -MW     Score  --  12  -MW        Functional Assessment    Outcome Measure Options  AM-PAC 6 Clicks Basic Mobility (PT)  -SB  AM-PAC 6 Clicks Daily Activity (OT)  -MW       User Key  (r) = Recorded By, (t) = Taken By, (c) = Cosigned By    Initials Name Provider Type    Lynnette Ma, OTR/L Occupational Therapist    Suzette Coe, PT Physical Therapist         Time Calculation:   PT Charges     Row Name 03/11/19 1634             Time Calculation    Start Time  1046  -SB      Stop Time  1139  -SB      Time Calculation (min)  53 min  -SB      PT Received On  03/11/19  -SB      PT Goal Re-Cert Due Date  03/21/19  -SB         User Key  (r) = Recorded By, (t) = Taken By, (c) = Cosigned By    Initials Name Provider Type    SB Suzette Muhammad, PT Physical Therapist        Therapy Suggested Charges     Code   Minutes Charges    None           Therapy Charges for Today     Code Description Service Date Service Provider Modifiers Qty    [id number] HC PT EVAL LOW COMPLEXITY 4 3/11/2019 Suzette Muhammad, PT  1          PT G-Codes  Outcome Measure Options: AM-PAC 6 Clicks Basic Mobility (PT)  AM-PAC 6 Clicks Score: 13  Score: 12      Suzette Muhammad PT  3/11/2019

## 2019-03-11 NOTE — PROGRESS NOTES
[facility] Progress Note    Progress Note    Patient:  Cary Ferris  YOB: 1960  Date of Service: 3/11/2019  MRN: [id number]   Acct: [id number]   Primary Care Physician: Aide Garces DO  Advance Directive:   Code Status and Medical Interventions:   Ordered at: 03/09/19 0214     Limited Support to NOT Include:    Intubation     Level Of Support Discussed With:    Patient     Code Status:    No CPR     Medical Interventions (Level of Support Prior to Arrest):    Limited     Admit Date: 3/8/2019       Hospital Day: 2      Subjective:     Chief Compliant:Seen with nurse Jayleen.  Pr doing poorly.  She is bed bound.  Admitted for failure to thrive.       Review of Systems:   Review of Systems   Constitutional: Positive for fatigue. Negative for chills and fever.   HENT: Negative for facial swelling and nosebleeds.    Eyes: Negative for redness and visual disturbance.   Respiratory: Positive for shortness of breath. Negative for wheezing.    Cardiovascular: Negative for chest pain.   Gastrointestinal: Positive for abdominal pain. Negative for nausea and vomiting.   Genitourinary: Negative for flank pain and hematuria.   Skin: Positive for pallor.   Neurological: Negative for tremors and facial asymmetry.   Hematological: Does not bruise/bleed easily.   Psychiatric/Behavioral: Negative for agitation and hallucinations.           Medications:   Scheduled Meds:  ceftriaxone 1 g Intravenous Q24H   dexamethasone 8 mg Intravenous Q12H   fentaNYL 1 patch Transdermal Q72H   sodium chloride 3 mL Intravenous Q12H       Continuous Infusions:     Labs:     Lab Results (last 72 hours)     Procedure Component Value Units Date/Time    Urine Culture - Urine, Urine, Clean Catch [[id number]]  (Abnormal)  (Susceptibility) Collected:  03/09/19 0010    Specimen:  Urine, Clean Catch Updated:  03/11/19 0703     Urine Culture 50,000-60,000 CFU/mL Escherichia coli      10,000-20,000 CFU/mL Mixed Gram  Positive Georgina     Comment: Probable contaminant.         Susceptibility      Escherichia coli     PRABHA     Ampicillin Resistant     Ampicillin + Sulbactam Intermediate     Cefazolin Susceptible     Cefepime Susceptible     Ceftriaxone Susceptible     Ertapenem Susceptible     ESBL Confirmation Test Negative     Gentamicin Susceptible     Levofloxacin Resistant     Meropenem Susceptible     Nitrofurantoin Susceptible     Piperacillin + Tazobactam Susceptible     Trimethoprim + Sulfamethoxazole Resistant                    Basic Metabolic Panel [[id number]]  (Abnormal) Collected:  03/10/19 0459    Specimen:  Blood Updated:  03/10/19 0544     Glucose 121 mg/dL      BUN 48 mg/dL      Creatinine 1.18 mg/dL      Sodium 139 mmol/L      Potassium 4.6 mmol/L      Chloride 99 mmol/L      CO2 27.0 mmol/L      Calcium 9.3 mg/dL      eGFR Non African Amer 47 mL/min/1.73      BUN/Creatinine Ratio 40.7     Anion Gap 13.0 mmol/L     Narrative:       GFR Normal >60  Chronic Kidney Disease <60  Kidney Failure <15    CBC & Differential [[id number]] Collected:  03/10/19 0459    Specimen:  Blood Updated:  03/10/19 0524    Narrative:       The following orders were created for panel order CBC & Differential.  Procedure                               Abnormality         Status                     ---------                               -----------         ------                     CBC Auto Differential[[id number]]        Abnormal            Final result                 Please view results for these tests on the individual orders.    CBC Auto Differential [[id number]]  (Abnormal) Collected:  03/10/19 0459    Specimen:  Blood Updated:  03/10/19 0524     WBC 14.37 10*3/mm3      RBC 4.22 10*6/mm3      Hemoglobin 12.3 g/dL      Hematocrit 37.0 %      MCV 87.7 fL      MCH 29.1 pg      MCHC 33.2 g/dL      RDW 21.0 %      RDW-SD 63.5 fl      MPV 12.7 fL      Platelets 155 10*3/mm3      Neutrophil % 86.4 %      Lymphocyte % 2.1 %      Monocyte % 9.0 %       Eosinophil % 0.1 %      Basophil % 0.3 %      Immature Grans % 2.1 %      Neutrophils, Absolute 12.42 10*3/mm3      Lymphocytes, Absolute 0.30 10*3/mm3      Monocytes, Absolute 1.30 10*3/mm3      Eosinophils, Absolute 0.01 10*3/mm3      Basophils, Absolute 0.04 10*3/mm3      Immature Grans, Absolute 0.30 10*3/mm3      nRBC 0.4 /100 WBC     Comprehensive Metabolic Panel [[id number]]  (Abnormal) Collected:  03/09/19 0521    Specimen:  Blood Updated:  03/09/19 0616     Glucose 100 mg/dL      BUN 48 mg/dL      Creatinine 1.21 mg/dL      Sodium 138 mmol/L      Potassium 4.7 mmol/L      Chloride 98 mmol/L      CO2 23.0 mmol/L      Calcium 9.6 mg/dL      Total Protein 5.7 g/dL      Albumin 3.20 g/dL      ALT (SGPT) 221 U/L      AST (SGOT) 282 U/L      Alkaline Phosphatase 465 U/L      Total Bilirubin 6.4 mg/dL      eGFR Non African Amer 46 mL/min/1.73      Globulin 2.5 gm/dL      A/G Ratio 1.3 g/dL      BUN/Creatinine Ratio 39.7     Anion Gap 17.0 mmol/L     Protime-INR [[id number]]  (Abnormal) Collected:  03/09/19 0521    Specimen:  Blood Updated:  03/09/19 0613     Protime 19.2 Seconds      INR 1.56    CBC (No Diff) [[id number]]  (Abnormal) Collected:  03/09/19 0521    Specimen:  Blood Updated:  03/09/19 0606     WBC 15.98 10*3/mm3      RBC 4.14 10*6/mm3      Hemoglobin 12.1 g/dL      Hematocrit 36.6 %      MCV 88.4 fL      MCH 29.2 pg      MCHC 33.1 g/dL      RDW 20.3 %      RDW-SD 62.4 fl      MPV 13.3 fL      Platelets 176 10*3/mm3     Basic Metabolic Panel [[id number]]  (Abnormal) Collected:  03/09/19 0336    Specimen:  Blood Updated:  03/09/19 0409     Glucose 100 mg/dL      BUN 49 mg/dL      Creatinine 1.16 mg/dL      Sodium 137 mmol/L      Potassium 4.8 mmol/L      Chloride 99 mmol/L      CO2 21.0 mmol/L      Calcium 9.6 mg/dL      eGFR Non African Amer 48 mL/min/1.73      BUN/Creatinine Ratio 42.2     Anion Gap 17.0 mmol/L     Narrative:       GFR Normal >60  Chronic Kidney Disease <60  Kidney Failure <15    CBC  Auto Differential [[id number]]  (Abnormal) Collected:  03/09/19 0336    Specimen:  Blood Updated:  03/09/19 0405     WBC 15.94 10*3/mm3      RBC 4.14 10*6/mm3      Hemoglobin 12.5 g/dL      Hematocrit 37.2 %      MCV 89.9 fL      MCH 30.2 pg      MCHC 33.6 g/dL      RDW 20.6 %      RDW-SD 63.9 fl      MPV 12.7 fL      Platelets 187 10*3/mm3      Neutrophil % 86.2 %      Lymphocyte % 2.1 %      Monocyte % 9.5 %      Eosinophil % 0.1 %      Basophil % 0.2 %      Immature Grans % 1.9 %      Neutrophils, Absolute 13.75 10*3/mm3      Lymphocytes, Absolute 0.34 10*3/mm3      Monocytes, Absolute 1.51 10*3/mm3      Eosinophils, Absolute 0.01 10*3/mm3      Basophils, Absolute 0.03 10*3/mm3      Immature Grans, Absolute 0.30 10*3/mm3      nRBC 0.5 /100 WBC     Urinalysis, Microscopic Only - Urine, Clean Catch [[id number]]  (Abnormal) Collected:  03/09/19 0010    Specimen:  Urine, Clean Catch Updated:  03/09/19 0038     RBC, UA 0-2 /HPF      WBC, UA 21-30 /HPF      Bacteria, UA 2+ /HPF      Squamous Epithelial Cells, UA 3-6 /HPF      Hyaline Casts, UA 0-2 /LPF      Methodology Manual Light Microscopy    Urinalysis With Culture If Indicated - Urine, Clean Catch [[id number]]  (Abnormal) Collected:  03/09/19 0010    Specimen:  Urine, Clean Catch Updated:  03/09/19 0038     Color, UA Dark Yellow     Appearance, UA Cloudy     pH, UA 5.5     Specific Gravity, UA 1.024     Glucose, UA Negative     Ketones, UA Trace     Bilirubin, UA Large (3+)     Blood, UA Negative     Protein, UA 30 mg/dL (1+)     Leuk Esterase, UA Large (3+)     Nitrite, UA Positive     Urobilinogen, UA 2.0 E.U./dL    [place] Draw [[id number]] Collected:  03/08/19 2105    Specimen:  Blood Updated:  03/09/19 0000    Narrative:       The following orders were created for panel order [place] Draw.  Procedure                               Abnormality         Status                     ---------                               -----------         ------                      Light Blue Top[[id number]]                                   Final result               Green Top (Gel)[[id number]]                                  Final result               Lavender Top[[id number]]                                     Final result               Red Top[[id number]]                                          Final result                 Please view results for these tests on the individual orders.    Green Top (Gel) [[id number]] Collected:  03/08/19 2247    Specimen:  Blood Updated:  03/09/19 0000     Extra Tube Hold for add-ons.     Comment: Auto resulted.       BNP [[id number]]  (Abnormal) Collected:  03/08/19 2246    Specimen:  Blood Updated:  03/08/19 2315     proBNP 3,160.0 pg/mL     Troponin [[id number]]  (Abnormal) Collected:  03/08/19 2246    Specimen:  Blood Updated:  03/08/19 2315     Troponin I 0.045 ng/mL     Lipase [[id number]]  (Abnormal) Collected:  03/08/19 2246    Specimen:  Blood Updated:  03/08/19 2303     Lipase 215 U/L     Comprehensive Metabolic Panel [[id number]]  (Abnormal) Collected:  03/08/19 2246    Specimen:  Blood Updated:  03/08/19 2303     Glucose 118 mg/dL      BUN 48 mg/dL      Creatinine 1.12 mg/dL      Sodium 138 mmol/L      Potassium 5.0 mmol/L      Chloride 100 mmol/L      CO2 23.0 mmol/L      Calcium 9.4 mg/dL      Total Protein 5.8 g/dL      Albumin 3.40 g/dL      ALT (SGPT) 236 U/L      AST (SGOT) 308 U/L      Alkaline Phosphatase 508 U/L      Total Bilirubin 6.2 mg/dL      eGFR Non African Amer 50 mL/min/1.73      Globulin 2.4 gm/dL      A/G Ratio 1.4 g/dL      BUN/Creatinine Ratio 42.9     Anion Gap 15.0 mmol/L     Light Blue Top [[id number]] Collected:  03/08/19 2105    Specimen:  Blood from Hand, Right Updated:  03/08/19 2215     Extra Tube hold for add-on     Comment: Auto resulted       Red Top [[id number]] Collected:  03/08/19 2105    Specimen:  Blood Updated:  03/08/19 2215     Extra Tube Hold for add-ons.     Comment: Auto resulted.       Lavender Top  [[id number]] Collected:  03/08/19 2105    Specimen:  Blood Updated:  03/08/19 2215     Extra Tube hold for add-on     Comment: Auto resulted       CBC & Differential [[id number]] Collected:  03/08/19 2105    Specimen:  Blood Updated:  03/08/19 2146    Narrative:       The following orders were created for panel order CBC & Differential.  Procedure                               Abnormality         Status                     ---------                               -----------         ------                     CBC Auto Differential[[id number]]        Abnormal            Final result                 Please view results for these tests on the individual orders.    CBC Auto Differential [[id number]]  (Abnormal) Collected:  03/08/19 2105    Specimen:  Blood Updated:  03/08/19 2146     WBC 16.41 10*3/mm3      RBC 4.26 10*6/mm3      Hemoglobin 12.5 g/dL      Hematocrit 37.2 %      MCV 87.3 fL      MCH 29.3 pg      MCHC 33.6 g/dL      RDW 20.2 %      RDW-SD 61.5 fl      MPV 12.7 fL      Platelets 212 10*3/mm3      Neutrophil % 86.4 %      Lymphocyte % 2.1 %      Monocyte % 8.7 %      Eosinophil % 0.0 %      Basophil % 0.4 %      Immature Grans % 2.4 %      Neutrophils, Absolute 14.19 10*3/mm3      Lymphocytes, Absolute 0.34 10*3/mm3      Monocytes, Absolute 1.43 10*3/mm3      Eosinophils, Absolute 0.00 10*3/mm3      Basophils, Absolute 0.06 10*3/mm3      Immature Grans, Absolute 0.39 10*3/mm3      nRBC 0.6 /100 WBC             Radiology:     Imaging Results (last 72 hours)     Procedure Component Value Units Date/Time    US Abdomen Limited [[id number]] Collected:  03/10/19 1530     Updated:  03/10/19 1537    Narrative:       HISTORY: Right upper quadrant pain; PET/CT [facility] on 2/28/2019  didn't demonstrate intrathoracic malignancy of the left lung and pleural  space with metastatic lymphadenopathy as well as diffuse hepatic and  bony metastasis.     Right upper quadrant ultrasound: Sonographic examination right  upper  quadrant is performed.     The liver is prominent in size and heterogeneous in appearance. There  are multiple, too numerous to count hyperechoic lesions scattered  throughout the liver consistent with diffuse hepatic metastasis seen and  described on the recent PET exam. There is a small volume of ascites  seen adjacent to the liver. The gallbladder is contracted. Gallbladder  wall is mildly thickened at 3 mm which could be due to the contracted  state to the presence of ascites. No discrete gallstones identified.     There is portal hydronephrosis with questionable cavernous  transformation. No intrahepatic biliary dilatation is visualized. The  common bile but measures 5 to 6 mm, upper limits of normal.     Images of right kidney are unremarkable.       Impression:       1. Enlarged liver with too numerous to count hyperechoic foci consistent  with metastasis. Please refer to report above for description of an  outside PET exam.  2. Portal vein thrombosis. Questionable cavernous transformation.  3. Small volume of ascites adjacent to the liver. Gallbladder wall  thickening may be due to incomplete distention of the gallbladder as  well as the ascites. No gallstones identified. No intrahepatic biliary  dilatation identified. Common bile duct upper limits of normal.  This report was finalized on 03/10/2019 15:34 by Dr. Aide Jose MD.    XR Chest 2 View [[id number]] Collected:  03/08/19 2250     Updated:  03/08/19 2254    Narrative:       EXAMINATION: XR CHEST 2 VW-  3/8/2019 10:50 PM CST     TWO-VIEW CHEST:      HISTORY: Shortness of air      Frontal and lateral projection chest radiograph obtained.     COMPARISON:  2/12/2019      FINDINGS:     Left hemithorax opacification again noted.     The right lung is grossly clear.     Infusion catheter again identified.         The bony structures are intact.                                                                                                           "        Impression:       1. Persistent left hemithorax opacification.  2. Right lung is clear.        This report was finalized on 03/08/2019 22:51 by Dr. Blas Mahajan MD.            Objective:   Vitals: /88 (BP Location: Right arm, Patient Position: Lying)   Pulse 102   Temp 97.5 °F (36.4 °C) (Oral)   Resp 18   Ht 177.8 cm (70\")   Wt 111 kg (244 lb)   LMP  (LMP Unknown)   SpO2 91%   BMI 35.01 kg/m²   Physical Exam   Constitutional: She is oriented to person, place, and time.   Chronically ill looking   HENT:   Head: Normocephalic and atraumatic.   Eyes: Scleral icterus is present.   Neck: No JVD present.   Cardiovascular: Normal rate and regular rhythm.   Pulmonary/Chest: She has no wheezes.   Diminished breath sounds, left.    Abdominal: Soft. Bowel sounds are normal. There is no tenderness.   Musculoskeletal: She exhibits edema.   Neurological: She is oriented to person, place, and time.   Skin:   Jaundiced.   Psychiatric:   Mood and affect appropriate for her circumstance.    Nursing note and vitals reviewed.    24HR INTAKE/OUTPUT:      Intake/Output Summary (Last 24 hours) at 3/11/2019 0800  Last data filed at 3/11/2019 0430  Gross per 24 hour   Intake 160 ml   Output 1150 ml   Net -990 ml        Problem list:       Failure to thrive in adult      Assessment/Plan:       ASSESSMENT:   1.   Failure to thrive for advanced lung malignancy.  2.   Poor performance status of 4.     3.   Multiple liver metastasis.   4.   Jaundice from number 3.   5.   Leukocytosis, reactive.   6.   Portal vein thrombosis from advanced malignancy.  7.   Abdominal pain from liver mets and portal vein thrombosis.     CONCURRENT PROBLEMS:   1.    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  Tumor [place]:  3.7 cm left upper lobe mass with associated 3.1 cm left hilar node.  Complications of Tumor:  None.  Tumor Status:  " Definitive chemo + radiation completed.  PET scan of 11/24/2014 compared with study from 05/12/2014 (above). The previously seen left upper lobe mass has significantly decreased in size (previously 3.7 cm, now 0.8 cm) and now demonstrates only faintly increased metabolic activity. This previously demonstrated a maximum SUV of 13.7 and now demonstrates a maximum SUV of 1.9. The previously identified left hilar lymph node is no longer visualized and demonstrates no significantly increased metabolic activity as compared to background.  PET scan performed on 03/27/2015 at [facility] was revealing for some diffuse activity in the left upper lobe region of treatment with mildly increased metabolic activity noted as described above most likely related to post treatment changes. Continued followup will be required for verification, however.   CT scan of the chest, 06/15/2016 ([facility])  Increasing collapse in left upper lobe since 02/18/2016.  There is a new left pleural effusion since that time.  No specific etiology and no residual malignancy is identified.  [facility], CT of the chest, 10/20/2016. Impression: Persistent chronic atelectasis in the left upper posterior lung. Resolution of a small left pleural effusion since 06/15/2016. No other significant interval change.  Chest x-ray, [facility] 02/16/2017.  Impression:  Probable radiation fibrosis left upper medial lung.  Chest x-ray, [facility] 07/06/2017.  Impression:  No acute pathology in the chest.  Stable atelectasis or fibrothorax in the left upper medial lung.  Chest x-ray, [facility] 11/22/2017.  Impression:  No acute pathology in the chest.   Chest x ray 05/16/2018 at [facility] showed no acute pathology in the chest.    Chest x-ray done on 11/14/2018 at [facility]. Impression: No acute pathology in the chest   RELAPSE: CT angiogram of the lung with contrast on 01/25/2019 revealed a very  large left pleural effusion with left lung atelectasis/collapse. Mild tension with mass-effect and mild left-to-right midline shift of the mediastinal structures. Primary differential considerations include a malignant effusion. Developing mediastinal lymphadenopathy (large right paratracheal lymph node compatible with metastatic adenopathy measuring 2.5 x 2.6 cm, left paratracheal lymphadenopathy in the precarinal/AP window measuring 1.2 cm, subcarinal adenopathy 2.2 cm, left paratracheal node 1.4 cm). A 12 mm irregular pulmonary nodule right lung base medially, pulmonary metastasis considered.  Developing upper abdominal/retrocrural lymphadenopathy. No pulmonary embolism or acute aortic pathology.   RELAPSE: Chest 2 views 2/12/2019.  FINDINGS: Today's exam is compared to previous study of 9/3/2014. There is complete opacification of the left hemithorax with associated volume loss and mediastinal shift to the left. An air-fluid level is noted within the left lung apex. Follow-up with CT imaging of the chest may be helpful for better characterization of the etiology of the opacification. The right lung is fully expanded and clear except for minimal effusion. 1. Complete opacification of the left hemithorax with associated volume loss suggesting partial collapse of the left lung. There is a small amount of air within the left lung apex. Follow-up with CT imaging may be helpful for better characterization of the findings. 2. Tiny right effusion with the right lung otherwise clear.  PET 02/28/2019. Intrathoracic malignancy with abnormal FDG uptake seen throughout the compressed left lung parenchyma as well as the left pleural space. There are hypermetabolic metastatic lymph nodes of the mediastinum and the lower neck.  2. Diffuse hepatic metastasis. 3. Bony metastasis. 4. Heterogeneous activity of the gray-white matter interface of the base the brain. Findings worrisome for potential intracranial metastasis. Follow-up  "MRI brain with and without contrast should be  considered.   Brain MRI 03/08/2019 showed no evidence of metastatic involvement of the brain.  2.    Abnormal CEA, 28.8 on 02/12/2019.  Stable (prior range:  4.2 - 7.2).  CT chest, 10/20/2016 YA and colonoscopy, 09/21/2015 (see above).  3.    Leukocytosis with neutrophilia.  Reactive?  4.    Mild normocytic anemia, new onset.  5.    Acute renal injury?  GFR 45 mL/min, 02/12/2019.  6.    Elevated liver enzymes, NOS  7.    Shoulder and back pains.  MRI cervical spine and thoracic spine, 10/14/2014 (above) show no metastasis to the spine but with early-to-moderate cervical spondylosis, including a small left paracentral disk herniation at C5-C6.  8.    Severe chronic obstructive pulmonary disease (COPD).    9.    Atrial fibrillation on maintenance Coumadin (Dr. Seth).  10.  Anxiety.  11.  Depression.  12.  History of deep venous thrombosis (DVT).  13.  Hyperlipidemia.  14.  Morbid obesity.  15.  Degenerative disk disease with cervical spine MRI, 10/14/2014.  Overall change of early-to-moderate cervical spondylosis, including a small left paracentral disk herniation at C5-C6.  16.  Headaches, migraines.  Unchanged.  17.  Reflux associated cough.  Modulated by omeprazole.  18.  Borderline non-fasting hyperglycemia.  19.  Symptomatic kidney stone and was scheduled for lithotripsy and stent placement on 05/21/2018 - Dr. Landaverde.      PLAN:   1.   Discussed progression of disease and she is too ill for chemo. Not a candidate for palliative chemo.  Note of Dr. Means reviewed.  Agre for supportive care. No anticoagulation for portal vein thrombosis due to her very poor overall prognosis.   2.    Re:  Hospice care.  She agreed.  \"Call my friend Kathie.\"  Talked with her friend Kathie.  Agreed for comfort measures/hospice. No palliative chest radiation due to her very poor overall condition.  \"She is so sick and I expected no more treatments.\"   Offered to call her " "daughter Kimberly, she declined.  \"I want you to call Kathie.\"        3.   Hospice consulted.  DNR in place.   4.   No clinic appointment and no further oncologic workup.     "

## 2019-03-11 NOTE — PROGRESS NOTES
Discharge Planning Assessment  [facility]     Patient Name: Cary Ferris  MRN: [id number]  Today's Date: 3/11/2019    Admit Date: 3/8/2019    Discharge Needs Assessment     Row Name 03/11/19 1120       Living Environment    Lives With  alone      Current Living Arrangements  home/apartment/condo      Primary Care Provided by  self      Provides Primary Care For  no one      Family Caregiver if Needed  friend(s)      Quality of Family Relationships  helpful;involved;supportive      Able to Return to Prior Arrangements  yes         Resource/Environmental Concerns    Resource/Environmental Concerns  none      Transportation Concerns  car, none         Transition Planning    Patient/Family Anticipates Transition to  home with family;home with help/services      Patient/Family Anticipated Services at Transition  none      Transportation Anticipated  family or friend will provide         Discharge Needs Assessment    Readmission Within the Last 30 Days  no previous admission in last 30 days      Concerns to be Addressed  denies needs/concerns at this time      Equipment Currently Used at Home  oxygen;walker, rolling;cane, straight;shower chair;commode o2-legacy    Anticipated Changes Related to Illness  none      Equipment Needed After Discharge  none      Offered/Gave Vendor List  yes      Discharge Coordination/Progress  Pt has RX coverage and a PCP. Pt has requested [facility] to be referred to. SW has contacted Karime from [facility] and appropriate information has been sent. SW will follow.          Discharge Plan    No documentation.       Destination      No service coordination in this encounter.      Durable Medical Equipment      No service coordination in this encounter.      Dialysis/Infusion      No service coordination in this encounter.      Home Medical Care      No service coordination in this encounter.      Community Resources      No service coordination in this encounter.           Demographic Summary    No documentation.       Functional Status    No documentation.       Psychosocial    No documentation.       Abuse/Neglect    No documentation.       Legal    No documentation.       Substance Abuse    No documentation.       Patient Forms    No documentation.           Bettye Davila

## 2019-03-11 NOTE — PLAN OF CARE
Problem: Patient Care Overview  Goal: Plan of Care Review  Outcome: Ongoing (interventions implemented as appropriate)   03/11/19 5288   Coping/Psychosocial   Plan of Care Reviewed With patient;friend   Plan of Care Review   Progress no change   OTHER   Outcome Summary OT eval completed. Pt with flat affect, delayed processing and increased time required to follow commands throughout eval. Comes to EOB with Marilee and significantly increased time, draw sheet required to square hips to EOB. Dep for LB dressing, unable to initiate task. x2 attempts to stand from EOB but unable to clear bottom from bed, with surface elevated stands minAx2. Returned to supine Marilee and rolls L/R CGA w vc for bed pan. Skilled OT required to increase independence and safety with ADL and functional transfers. Notes indicate plans for hospice at ..

## 2019-03-11 NOTE — PLAN OF CARE
Problem: Patient Care Overview  Goal: Plan of Care Review  Outcome: Ongoing (interventions implemented as appropriate)   03/11/19 0077   Coping/Psychosocial   Plan of Care Reviewed With patient   Plan of Care Review   Progress no change   OTHER   Outcome Summary Alert, answers questions appropriately but is slow to answer at times and forgetful. Pain controlled with ordered meds. Frequent turns to promote skin integrity. Appetite very poor. Sister at bedside continuosly.        Problem: Fall Risk (Adult)  Goal: Absence of Fall  Outcome: Ongoing (interventions implemented as appropriate)      Problem: Skin Injury Risk (Adult)  Goal: Skin Health and Integrity  Outcome: Ongoing (interventions implemented as appropriate)      Problem: Nutrition, Imbalanced: Inadequate Oral Intake (Adult)  Goal: Improved Oral Intake  Outcome: Ongoing (interventions implemented as appropriate)    Goal: Prevent Further Weight Loss  Outcome: Ongoing (interventions implemented as appropriate)      Problem: Pain, Acute (Adult)  Goal: Acceptable Pain Control/Comfort Level  Outcome: Ongoing (interventions implemented as appropriate)

## 2019-03-11 NOTE — PLAN OF CARE
Problem: Patient Care Overview  Goal: Plan of Care Review  Outcome: Ongoing (interventions implemented as appropriate)   03/11/19 1631   Coping/Psychosocial   Plan of Care Reviewed With patient   Plan of Care Review   Progress no change  (eval day)   OTHER   Outcome Summary PT eval completed. Pt with hyperfixation on attached lines throughout eval and needed increased time for processing commands. Pt also with very significant fatigue throughout the session and needed increased time to perform all activities. Pt performed sup to sit with min A and sit to stand with min Ax2 and elevated bed. Pt unable to do more than stand because of fatigue levels. Pt with weakness in BLE and overall decreased activity tolerance that will benefit from skilled PT. Anticipate hospice care at d/c per notes.

## 2019-03-12 PROCEDURE — 94799 UNLISTED PULMONARY SVC/PX: CPT

## 2019-03-12 PROCEDURE — 25010000002 CEFTRIAXONE PER 250 MG: Performed by: INTERNAL MEDICINE

## 2019-03-12 PROCEDURE — 97535 SELF CARE MNGMENT TRAINING: CPT

## 2019-03-12 PROCEDURE — 94760 N-INVAS EAR/PLS OXIMETRY 1: CPT

## 2019-03-12 PROCEDURE — 25010000002 MORPHINE PER 10 MG: Performed by: INTERNAL MEDICINE

## 2019-03-12 PROCEDURE — 25010000002 DEXAMETHASONE PER 1 MG: Performed by: INTERNAL MEDICINE

## 2019-03-12 RX ADMIN — SODIUM CHLORIDE, PRESERVATIVE FREE 10 ML: 5 INJECTION INTRAVENOUS at 11:26

## 2019-03-12 RX ADMIN — DEXAMETHASONE SODIUM PHOSPHATE 8 MG: 4 INJECTION, SOLUTION INTRA-ARTICULAR; INTRALESIONAL; INTRAMUSCULAR; INTRAVENOUS; SOFT TISSUE at 06:09

## 2019-03-12 RX ADMIN — MORPHINE SULFATE 4 MG: 4 INJECTION, SOLUTION INTRAMUSCULAR; INTRAVENOUS at 11:25

## 2019-03-12 RX ADMIN — FENTANYL 1 PATCH: 25 PATCH, EXTENDED RELEASE TRANSDERMAL at 10:07

## 2019-03-12 RX ADMIN — SODIUM CHLORIDE, PRESERVATIVE FREE 10 ML: 5 INJECTION INTRAVENOUS at 18:03

## 2019-03-12 RX ADMIN — CEFTRIAXONE SODIUM 1 G: 1 INJECTION, POWDER, FOR SOLUTION INTRAMUSCULAR; INTRAVENOUS at 03:17

## 2019-03-12 RX ADMIN — MORPHINE SULFATE 2 MG: 20 SOLUTION ORAL at 10:06

## 2019-03-12 RX ADMIN — SODIUM CHLORIDE, PRESERVATIVE FREE 3 ML: 5 INJECTION INTRAVENOUS at 09:38

## 2019-03-12 RX ADMIN — DEXAMETHASONE SODIUM PHOSPHATE 8 MG: 4 INJECTION, SOLUTION INTRA-ARTICULAR; INTRALESIONAL; INTRAMUSCULAR; INTRAVENOUS; SOFT TISSUE at 17:59

## 2019-03-12 RX ADMIN — MORPHINE SULFATE 4 MG: 4 INJECTION, SOLUTION INTRAMUSCULAR; INTRAVENOUS at 18:02

## 2019-03-12 NOTE — PLAN OF CARE
Problem: Patient Care Overview  Goal: Plan of Care Review  Outcome: Ongoing (interventions implemented as appropriate)   03/12/19 1538   Coping/Psychosocial   Plan of Care Reviewed With patient   Plan of Care Review   Progress no change   OTHER   Outcome Summary Alert, oriented most of the time, slow to respond at times. Up to chair via lift for short period. Pain increased in chair. Morphine administered per orders, effective after patient back in bed. Appetite very poor. Turned frequently to promote skin integrity.       Problem: Fall Risk (Adult)  Goal: Absence of Fall  Outcome: Ongoing (interventions implemented as appropriate)      Problem: Skin Injury Risk (Adult)  Goal: Skin Health and Integrity  Outcome: Ongoing (interventions implemented as appropriate)      Problem: Nutrition, Imbalanced: Inadequate Oral Intake (Adult)  Goal: Improved Oral Intake  Outcome: Ongoing (interventions implemented as appropriate)    Goal: Prevent Further Weight Loss  Outcome: Ongoing (interventions implemented as appropriate)      Problem: Pain, Acute (Adult)  Goal: Acceptable Pain Control/Comfort Level  Outcome: Ongoing (interventions implemented as appropriate)      Problem: Oncology Care (Adult)  Goal: Signs and Symptoms of Listed Potential Problems Will be Absent, Minimized or Managed (Oncology Care)  Outcome: Ongoing (interventions implemented as appropriate)

## 2019-03-12 NOTE — THERAPY TREATMENT NOTE
Acute Care - Occupational Therapy Treatment Note  [facility]     Patient Name: Cary Ferris  : 1960  MRN: [id number]  Today's Date: 3/12/2019  Onset of Illness/Injury or Date of Surgery: 19  Date of Referral to OT: 19  Referring Physician: Dr. Luong    Admit Date: 3/8/2019       ICD-10-CM ICD-9-CM   1. Failure to thrive in adult R62.7 783.7   2. NSTEMI (non-ST elevated myocardial infarction) (CMS/HCC) I21.4 410.70   3. Impaired mobility and ADLs Z74.09 799.89   4. Impaired functional mobility and activity tolerance Z74.09 V49.89     Patient Active Problem List   Diagnosis   • Small cell lung cancer (CMS/HCC)   • S/P radiation therapy   • Morbid obesity due to excess calories (CMS/HCC)   • Ureterolithiasis   • Former smoker   • Personal history of nicotine dependence   • Chronic pain syndrome   • Seasonal allergic rhinitis due to pollen   • Chronic systolic (congestive) heart failure (CMS/HCC)   • Recurrent pleural effusion on left   • Endobronchial mass   • Non-small cell cancer of left lung (CMS/HCC)   • Failure to thrive in adult   • Encounter for consultation     Past Medical History:   Diagnosis Date   • Adenocarcinoma of lung (CMS/HCC)    • Anemia     d/t chemotherapy   • Anxiety    • Atrial fibrillation (CMS/HCC)    • Chronic pain    • COPD (chronic obstructive pulmonary disease) (CMS/HCC)    • Coronary artery disease    • Depression    • DVT (deep venous thrombosis) (CMS/HCC)     left leg, in past   • Endobronchial mass 2019   • Generalized headaches    • High serum carcinoembryonic antigen (CEA)    • History of radiation therapy     6300 cGy to lung completed 2014; prophylactic brain radiation 2600 cGy completed 2015   • Hx of degenerative disc disease    • Hyperglycemia    • Hyperlipidemia    • Hypertension    • Meningitis    • Morbid obesity (CMS/HCC)    • Nephrolithiasis    • Non-small cell cancer of left lung (CMS/HCC) 2019   • Osteoarthritis    •  Oxygen desaturation during sleep     pt states oxygen company came and got oxygen ,did not qualify for usage   • Personal history of chemotherapy     For Lung Cancer   • Recurrent pleural effusion on left 2/18/2019     Past Surgical History:   Procedure Laterality Date   • BRONCHOSCOPY     • CARDIOVERSION      2014   • COLONOSCOPY     • KNEE SURGERY Left     x 3   • PARTIAL HYSTERECTOMY  12/2003   • PORTACATH PLACEMENT Left    • SKIN LESION EXCISION      Negative for malignacy   • TONSILLECTOMY     • URETEROSCOPY LASER LITHOTRIPSY WITH STENT INSERTION Right 5/21/2018    Procedure: CYSTO, URETEROSCOPY LASER LITHOTRIPSY WITH STENT INSERTION, RETROGRADE PYELOGRAM,;  Surgeon: Grzegorz Landaverde MD;  Location: [facility] OR;  Service: Urology       Therapy Treatment    Rehabilitation Treatment Summary     Row Name 03/12/19 0844             Treatment Time/Intention    Discipline  occupational therapy assistant  -TS      Document Type  therapy note (daily note)  -TS      Subjective Information  complains of;weakness;fatigue  -TS2      Patient Effort  fair  -TS2      Comment  confusion  -TS2      Existing Precautions/Restrictions  fall;oxygen therapy device and L/min  -TS2      Recorded by [TS] Vonnie Zarco TIDWELL/L 03/12/19 0844  [TS2] Vonnie Zarco TIDWELL/L 03/12/19 1245      Row Name 03/12/19 0844             Cognitive Assessment/Intervention- PT/OT    Personal Safety Interventions  fall prevention program maintained;elopement precautions initiated  -TS      Recorded by [TS] Vonnie Zarco TIDWELL/L 03/12/19 1245      Row Name 03/12/19 0844             Bed Mobility Assessment/Treatment    Rolling Left [place] (Bed Mobility)  contact guard;minimum assist (75% patient effort);verbal cues  -TS      Rolling Right [place] (Bed Mobility)  contact guard;minimum assist (75% patient effort);verbal cues  -TS      Assistive Device (Bed Mobility)  bed rails;draw sheet  -TS      Comment (Bed Mobility)   multiple rolls L and R with increased time for rest in between due to pt fatigue.  -TS      Recorded by [TS] Vonnie Zarco COTA/L 03/12/19 1245      Row Name 03/12/19 0844             Transfer Assessment/Treatment    Comment (Transfers)  educated and demonstrated use of lift to pt for safety with transfers at home  -TS      Recorded by [TS] Vonnie Zarco COTA/L 03/12/19 1245      Row Name 03/12/19 0844             ADL Assessment/Intervention    BADL Assessment/Intervention  toileting  -TS      Recorded by [TS] Vonnie Zarco TIDWELL/L 03/12/19 1245      Row Name 03/12/19 0844             Toileting Assessment/Training    [place] Level (Toileting)  perform perineal hygiene;dependent (less than 25% patient effort)  -TS      Assistive Devices (Toileting)  bedpan  -TS      Toileting Position  supine  -TS      Recorded by [TS] Vonnie Zarco COTA/L 03/12/19 1245      Row Name 03/12/19 0844             Positioning and Restraints    Pre-Treatment Position  in bed  -TS      Post Treatment Position  chair  -TS      In Chair  reclined;call light within reach;encouraged to call for assist;exit alarm on;notified nsg  -TS      Recorded by [TS] Vonnie Zarco COTA/L 03/12/19 1245      Row Name 03/12/19 0844             Pain Scale: Numbers Pre/Post-Treatment    Pain Scale: Numbers, Pretreatment  0/10 - no pain  -TS      Recorded by [TS] Vonnie Zarco TIDWELL/L 03/12/19 1245      Row Name                Wound 03/09/19 0330 Left labia blisters    Wound - Properties Group Date first assessed: 03/09/19 [AD] Time first assessed: 0330 [AD] Present On Admission : yes [AD] Side: Left [AD] Location: labia [AD] Type: blisters [AD] Recorded by:  [AD] Federica Lozano RN 03/09/19 0123    Row Name                Wound 03/09/19 0330 Bilateral gluteal MASD (moisture associated skin damage)    Wound - Properties Group Date first assessed: 03/09/19 [AD] Time first assessed: 0330 [AD] Side: Bilateral [AD]  Location: gluteal [AD] Type: MASD (moisture associated skin damage) [AD] Recorded by:  [AD] Federica Lozano RN 03/09/19 0434    Row Name 03/12/19 0844             Outcome Summary/Treatment Plan (OT)    Daily Summary of Progress (OT)  progress towards functional goals is fair  -TS      Recorded by [TS] Vonnie Zarco TIDWELL/L 03/12/19 1245        User Key  (r) = Recorded By, (t) = Taken By, (c) = Cosigned By    Initials Name Effective Dates Discipline    TS Vonnie Zarco TIDWELL/L 08/02/16 -  OT    AD Federica Lozano RN 08/02/16 -  Nurse        Wound 03/09/19 0330 Left labia blisters (Active)   Dressing Appearance open to air 3/12/2019  9:28 AM   Periwound other (see comments) 3/12/2019  9:28 AM   Drainage Amount none 3/11/2019  8:01 PM   Dressing Care, Wound open to air 3/11/2019  8:01 PM   Periwound Care, Wound barrier ointment applied 3/12/2019  9:28 AM       Wound 03/09/19 0330 Bilateral gluteal MASD (moisture associated skin damage) (Active)   Dressing Appearance open to air 3/12/2019  9:28 AM   Closure Open to air 3/12/2019  9:28 AM   Periwound pink;blanchable 3/12/2019  9:28 AM   Drainage Amount none 3/11/2019  8:01 PM   Care, Wound cleansed with;soap and water 3/12/2019  9:28 AM   Periwound Care, Wound barrier ointment applied 3/12/2019  9:28 AM       Occupational Therapy Education     Title: PT OT SLP Therapies (Done)     Topic: Occupational Therapy (Done)     Point: ADL training (Done)     Description: Instruct learner(s) on proper safety adaptation and remediation techniques during self care or transfers.   Instruct in proper use of assistive devices.    Learning Progress Summary           Patient Acceptance, E, VU by DANYELLE at 3/11/2019 12:03 PM    Comment:  ADL,  transfers, bed mobility, benefit of increased activity and upright sitting, pursed lip breathing, OT POC                               User Key     Initials Effective Dates Name Provider Type Discipline     08/28/18 -  Lynnette Johns  OTR/L Occupational Therapist OT                OT Recommendation and Plan  Outcome Summary/Treatment Plan (OT)  Daily Summary of Progress (OT): progress towards functional goals is fair  Daily Summary of Progress (OT): progress towards functional goals is fair  Outcome Measures     Row Name 03/12/19 1200 03/11/19 1500 03/11/19 1100       How much help from another person do you currently need...    Turning from your back to your side while in flat bed without using bedrails?  --  3  -SB  --    Moving from lying on back to sitting on the side of a flat bed without bedrails?  --  3  -SB  --    Moving to and from a bed to a chair (including a wheelchair)?  --  2  -SB  --    Standing up from a chair using your arms (e.g., wheelchair, bedside chair)?  --  3  -SB  --    Climbing 3-5 steps with a railing?  --  1  -SB  --    To walk in hospital room?  --  1  -SB  --    AM-PAC 6 Clicks Score  --  13  -SB  --       How much help from another is currently needed...    Putting on and taking off regular lower body clothing?  1  -TS  --  1  -MW    Bathing (including washing, rinsing, and drying)  2  -TS  --  2  -MW    Toileting (which includes using toilet bed pan or urinal)  1  -TS  --  2  -MW    Putting on and taking off regular upper body clothing  2  -TS  --  2  -MW    Taking care of personal grooming (such as brushing teeth)  2  -TS  --  2  -MW    Eating meals  3  -TS  --  3  -MW    Score  11  -TS  --  12  -MW       Functional Assessment    Outcome Measure Options  AM-PAC 6 Clicks Daily Activity (OT)  -TS  AM-PAC 6 Clicks Basic Mobility (PT)  -SB  AM-PAC 6 Clicks Daily Activity (OT)  -MW      User Key  (r) = Recorded By, (t) = Taken By, (c) = Cosigned By    Initials Name Provider Type    TS Vonnie Zarco, TIDWELL/L Occupational Therapy Assistant    Lynnette Ma, OTR/L Occupational Therapist    Suzette Coe, PT Physical Therapist           Time Calculation:   Time Calculation- OT     Row Name 03/12/19 2181              Time Calculation- OT    OT Start Time  0844  -TS      OT Stop Time  0930  -TS      OT Time Calculation (min)  46 min  -TS      Total Timed Code Minutes- OT  46 minute(s)  -TS      OT Received On  03/12/19  -TS         Timed Charges    18731 - OT Self Care/Mgmt Minutes  46  -TS        User Key  (r) = Recorded By, (t) = Taken By, (c) = Cosigned By    Initials Name Provider Type    TS Vonnie Zarco COTA/L Occupational Therapy Assistant           Therapy Suggested Charges     Code   Minutes Charges    01135 (CPT®) Hc Ot Neuromusc Re Education Ea 15 Min      03346 (CPT®) Hc Ot Ther Proc Ea 15 Min      63248 (CPT®) Hc Ot Therapeutic Act Ea 15 Min      47870 (CPT®) Hc Ot Manual Therapy Ea 15 Min      74209 (CPT®) Hc Ot Iontophoresis Ea 15 Min      92832 (CPT®) Hc Ot Elec Stim Ea-Per 15 Min      01693 (CPT®) Hc Ot Ultrasound Ea 15 Min      88811 (CPT®) Hc Ot Self Care/Mgmt/Train Ea 15 Min 46 3    Total  46 3        Therapy Charges for Today     Code Description Service Date Service Provider Modifiers Qty    90478084758 HC OT SELF CARE/MGMT/TRAIN EA 15 MIN 3/12/2019 Vonnie Zarco COTA/L GO 3               Vonnie BARRETT. CHARY Zarco  3/12/2019

## 2019-03-12 NOTE — PROGRESS NOTES
[facility] Medicine Services  INPATIENT PROGRESS NOTE    Patient Name: Cary Ferris  Date of Admission: 3/8/2019  Today's Date: 03/12/19  Length of Stay: 3  Primary Care Physician: Aide Garces DO    Subjective   Chief Complaint: generalized pain.   HPI   Has been up in chair this AM with lift.   Increased pain with movement  Has had oral morphine    Cough with sputum.    No family at bedside.     Review of Systems     Unable to complete review of systems as patient is not able at this time    Objective    Temp:  [94.7 °F (34.8 °C)-98.2 °F (36.8 °C)] 98.2 °F (36.8 °C)  Heart Rate:  [] 114  Resp:  [18-20] 18  BP: (130-162)/(72-96) 130/80  Physical Exam   Constitutional: She appears well-developed and well-nourished.   HENT:   Head: Normocephalic and atraumatic.   Eyes: Conjunctivae and EOM are normal. Pupils are equal, round, and reactive to light.   Neck: Neck supple.   Cardiovascular: Normal rate and regular rhythm. Exam reveals no gallop and no friction rub.   No murmur heard.  Pulmonary/Chest: Effort normal.   Coarse lung sounds with thick sputum   Abdominal: Soft. Bowel sounds are normal. There is no hepatosplenomegaly. There is no tenderness.   Musculoskeletal: Normal range of motion. She exhibits no edema.   Neurological: No cranial nerve deficit.   Awake    Skin: Skin is dry.   Cool     Psychiatric:   Affect flat   Nursing note and vitals reviewed.          Results Review:  I have reviewed the labs, radiology results, and diagnostic studies.    Laboratory Data:   Results from last 7 days   Lab Units 03/10/19  0459 03/09/19  0521 03/09/19  0336   WBC 10*3/mm3 14.37* 15.98* 15.94*   HEMOGLOBIN g/dL 12.3 12.1 12.5   HEMATOCRIT % 37.0 36.6* 37.2   PLATELETS 10*3/mm3 155 176 187        Results from last 7 days   Lab Units 03/10/19  0459 03/09/19  0521 03/09/19  0336 03/08/19  2246 03/07/19  1004   SODIUM mmol/L 139 138 137 138 139   POTASSIUM mmol/L 4.6  4.7 4.8 5.0 4.5   CHLORIDE mmol/L 99 98 99 100 100   CO2 mmol/L 27.0 23.0* 21.0* 23.0* 25.0   BUN mg/dL 48* 48* 49* 48* 37*   CREATININE mg/dL 1.18 1.21 1.16 1.12 1.05   CALCIUM mg/dL 9.3 9.6 9.6 9.4 9.3   BILIRUBIN mg/dL  --  6.4*  --  6.2* 4.4*   ALK PHOS U/L  --  465*  --  508* 525*   ALT (SGPT) U/L  --  221*  --  236* 219*   AST (SGOT) U/L  --  282*  --  308* 225*   GLUCOSE mg/dL 121* 100 100 118* 97       Culture Data:   Urine Culture   Date Value Ref Range Status   03/09/2019 50,000-60,000 CFU/mL Escherichia coli (A)  Final   03/09/2019 10,000-20,000 CFU/mL Mixed Gram Positive Georgina (A)  Final     Comment:     Probable contaminant.         Radiology Data:   Imaging Results (last 24 hours)     ** No results found for the last 24 hours. **          I have reviewed the patient's current medications.     Assessment/Plan     Active Hospital Problems    Diagnosis   • Failure to thrive in adult     Assessment     Lung cancer, poorly differentiated high grade carcinoma with mets  Left lung opacification/colapse  COPD severe  afib  Hx DVT  Failure to thrive in adult secondary to know cancer    Plan    Pain control  Increase oral morphine to 4 mg  Home with hospice in AM when DME in place.     Discharge Planning: I expect the patient to be discharged to home in 1 days.    Alicia Jaquez DO   03/12/19   11:35 AM

## 2019-03-12 NOTE — PLAN OF CARE
Problem: Patient Care Overview  Goal: Plan of Care Review  Outcome: Ongoing (interventions implemented as appropriate)   03/12/19 7639   Coping/Psychosocial   Plan of Care Reviewed With patient   Plan of Care Review   Progress no change   OTHER   Outcome Summary Had one dose of pain medicine on me. Has rested in between being repositioned. when she didnt do it herself. Poor appetite. ABX cont.        Problem: Fall Risk (Adult)  Goal: Absence of Fall  Outcome: Ongoing (interventions implemented as appropriate)      Problem: Skin Injury Risk (Adult)  Goal: Skin Health and Integrity  Outcome: Ongoing (interventions implemented as appropriate)      Problem: Nutrition, Imbalanced: Inadequate Oral Intake (Adult)  Goal: Improved Oral Intake  Outcome: Ongoing (interventions implemented as appropriate)    Goal: Prevent Further Weight Loss  Outcome: Ongoing (interventions implemented as appropriate)      Problem: Pain, Acute (Adult)  Goal: Acceptable Pain Control/Comfort Level  Outcome: Ongoing (interventions implemented as appropriate)      Problem: Oncology Care (Adult)  Goal: Signs and Symptoms of Listed Potential Problems Will be Absent, Minimized or Managed (Oncology Care)  Outcome: Ongoing (interventions implemented as appropriate)

## 2019-03-12 NOTE — PROGRESS NOTES
Continued Stay Note   Jocelyne     Patient Name: Cary Ferris  MRN: [id number]  Today's Date: 3/12/2019    Admit Date: 3/8/2019    Discharge Plan     Row Name 03/12/19 0957       Plan    Plan Comments  TANYA spoke with Lindsay from [facility]. She stated that hospice will accept pt tomorrow if discharged. Equipment needed can be delivered in AM when family is ready per Lindsay. SW will follow.          Discharge Codes    No documentation.             Bettye Davila

## 2019-03-12 NOTE — PROGRESS NOTES
"[facility] Progress Note    Progress Note    Patient:  Cary Ferris  YOB: 1960  Date of Service: 3/12/2019  MRN: [id number]   Acct: [id number]   Primary Care Physician: Aide Garces DO  Advance Directive:   Code Status and Medical Interventions:   Ordered at: 03/09/19 0214     Limited Support to NOT Include:    Intubation     Level Of Support Discussed With:    Patient     Code Status:    No CPR     Medical Interventions (Level of Support Prior to Arrest):    Limited     Admit Date: 3/8/2019       Hospital Day: 3      Subjective:     Chief Compliant:  \"Not doing so good.\"  Remains bed bound.  Appetite poor.  Vague pains in the right upper quadrant. Is easily short of breath even at rest.      Review of Systems:   Review of Systems   Constitutional: Positive for fatigue. Negative for chills and fever.   HENT: Negative for facial swelling and nosebleeds.    Eyes: Negative for redness and visual disturbance.   Respiratory: Positive for cough and shortness of breath. Negative for wheezing.    Cardiovascular: Negative for chest pain.   Gastrointestinal: Positive for abdominal pain. Negative for nausea and vomiting.   Genitourinary: Negative for flank pain and hematuria.   Skin: Positive for pallor.   Neurological: Negative for tremors and facial asymmetry.   Hematological: Does not bruise/bleed easily.   Psychiatric/Behavioral: Negative for agitation and hallucinations.           Medications:   Scheduled Meds:    ceftriaxone 1 g Intravenous Q24H   dexamethasone 8 mg Intravenous Q12H   fentaNYL 1 patch Transdermal Q72H   sodium chloride 3 mL Intravenous Q12H       Continuous Infusions:     Labs:     Lab Results (last 72 hours)     Procedure Component Value Units Date/Time    Urine Culture - Urine, Urine, Clean Catch [[id number]]  (Abnormal)  (Susceptibility) Collected:  03/09/19 0010    Specimen:  Urine, Clean Catch Updated:  03/11/19 0703     Urine Culture 50,000-60,000 CFU/mL " Escherichia coli      10,000-20,000 CFU/mL Mixed Gram Positive Georgina     Comment: Probable contaminant.         Susceptibility      Escherichia coli     PRABHA     Ampicillin Resistant     Ampicillin + Sulbactam Intermediate     Cefazolin Susceptible     Cefepime Susceptible     Ceftriaxone Susceptible     Ertapenem Susceptible     ESBL Confirmation Test Negative     Gentamicin Susceptible     Levofloxacin Resistant     Meropenem Susceptible     Nitrofurantoin Susceptible     Piperacillin + Tazobactam Susceptible     Trimethoprim + Sulfamethoxazole Resistant                    Basic Metabolic Panel [[id number]]  (Abnormal) Collected:  03/10/19 0459    Specimen:  Blood Updated:  03/10/19 0544     Glucose 121 mg/dL      BUN 48 mg/dL      Creatinine 1.18 mg/dL      Sodium 139 mmol/L      Potassium 4.6 mmol/L      Chloride 99 mmol/L      CO2 27.0 mmol/L      Calcium 9.3 mg/dL      eGFR Non African Amer 47 mL/min/1.73      BUN/Creatinine Ratio 40.7     Anion Gap 13.0 mmol/L     Narrative:       GFR Normal >60  Chronic Kidney Disease <60  Kidney Failure <15    CBC & Differential [[id number]] Collected:  03/10/19 0459    Specimen:  Blood Updated:  03/10/19 0524    Narrative:       The following orders were created for panel order CBC & Differential.  Procedure                               Abnormality         Status                     ---------                               -----------         ------                     CBC Auto Differential[[id number]]        Abnormal            Final result                 Please view results for these tests on the individual orders.    CBC Auto Differential [[id number]]  (Abnormal) Collected:  03/10/19 0459    Specimen:  Blood Updated:  03/10/19 0524     WBC 14.37 10*3/mm3      RBC 4.22 10*6/mm3      Hemoglobin 12.3 g/dL      Hematocrit 37.0 %      MCV 87.7 fL      MCH 29.1 pg      MCHC 33.2 g/dL      RDW 21.0 %      RDW-SD 63.5 fl      MPV 12.7 fL      Platelets 155 10*3/mm3      Neutrophil  % 86.4 %      Lymphocyte % 2.1 %      Monocyte % 9.0 %      Eosinophil % 0.1 %      Basophil % 0.3 %      Immature Grans % 2.1 %      Neutrophils, Absolute 12.42 10*3/mm3      Lymphocytes, Absolute 0.30 10*3/mm3      Monocytes, Absolute 1.30 10*3/mm3      Eosinophils, Absolute 0.01 10*3/mm3      Basophils, Absolute 0.04 10*3/mm3      Immature Grans, Absolute 0.30 10*3/mm3      nRBC 0.4 /100 WBC     Comprehensive Metabolic Panel [[id number]]  (Abnormal) Collected:  03/09/19 0521    Specimen:  Blood Updated:  03/09/19 0616     Glucose 100 mg/dL      BUN 48 mg/dL      Creatinine 1.21 mg/dL      Sodium 138 mmol/L      Potassium 4.7 mmol/L      Chloride 98 mmol/L      CO2 23.0 mmol/L      Calcium 9.6 mg/dL      Total Protein 5.7 g/dL      Albumin 3.20 g/dL      ALT (SGPT) 221 U/L      AST (SGOT) 282 U/L      Alkaline Phosphatase 465 U/L      Total Bilirubin 6.4 mg/dL      eGFR Non African Amer 46 mL/min/1.73      Globulin 2.5 gm/dL      A/G Ratio 1.3 g/dL      BUN/Creatinine Ratio 39.7     Anion Gap 17.0 mmol/L     Protime-INR [[id number]]  (Abnormal) Collected:  03/09/19 0521    Specimen:  Blood Updated:  03/09/19 0613     Protime 19.2 Seconds      INR 1.56    CBC (No Diff) [[id number]]  (Abnormal) Collected:  03/09/19 0521    Specimen:  Blood Updated:  03/09/19 0606     WBC 15.98 10*3/mm3      RBC 4.14 10*6/mm3      Hemoglobin 12.1 g/dL      Hematocrit 36.6 %      MCV 88.4 fL      MCH 29.2 pg      MCHC 33.1 g/dL      RDW 20.3 %      RDW-SD 62.4 fl      MPV 13.3 fL      Platelets 176 10*3/mm3     Basic Metabolic Panel [[id number]]  (Abnormal) Collected:  03/09/19 0336    Specimen:  Blood Updated:  03/09/19 0409     Glucose 100 mg/dL      BUN 49 mg/dL      Creatinine 1.16 mg/dL      Sodium 137 mmol/L      Potassium 4.8 mmol/L      Chloride 99 mmol/L      CO2 21.0 mmol/L      Calcium 9.6 mg/dL      eGFR Non African Amer 48 mL/min/1.73      BUN/Creatinine Ratio 42.2     Anion Gap 17.0 mmol/L     Narrative:       GFR Normal  >60  Chronic Kidney Disease <60  Kidney Failure <15    CBC Auto Differential [[id number]]  (Abnormal) Collected:  03/09/19 0336    Specimen:  Blood Updated:  03/09/19 0405     WBC 15.94 10*3/mm3      RBC 4.14 10*6/mm3      Hemoglobin 12.5 g/dL      Hematocrit 37.2 %      MCV 89.9 fL      MCH 30.2 pg      MCHC 33.6 g/dL      RDW 20.6 %      RDW-SD 63.9 fl      MPV 12.7 fL      Platelets 187 10*3/mm3      Neutrophil % 86.2 %      Lymphocyte % 2.1 %      Monocyte % 9.5 %      Eosinophil % 0.1 %      Basophil % 0.2 %      Immature Grans % 1.9 %      Neutrophils, Absolute 13.75 10*3/mm3      Lymphocytes, Absolute 0.34 10*3/mm3      Monocytes, Absolute 1.51 10*3/mm3      Eosinophils, Absolute 0.01 10*3/mm3      Basophils, Absolute 0.03 10*3/mm3      Immature Grans, Absolute 0.30 10*3/mm3      nRBC 0.5 /100 WBC     Urinalysis, Microscopic Only - Urine, Clean Catch [[id number]]  (Abnormal) Collected:  03/09/19 0010    Specimen:  Urine, Clean Catch Updated:  03/09/19 0038     RBC, UA 0-2 /HPF      WBC, UA 21-30 /HPF      Bacteria, UA 2+ /HPF      Squamous Epithelial Cells, UA 3-6 /HPF      Hyaline Casts, UA 0-2 /LPF      Methodology Manual Light Microscopy    Urinalysis With Culture If Indicated - Urine, Clean Catch [[id number]]  (Abnormal) Collected:  03/09/19 0010    Specimen:  Urine, Clean Catch Updated:  03/09/19 0038     Color, UA Dark Yellow     Appearance, UA Cloudy     pH, UA 5.5     Specific Gravity, UA 1.024     Glucose, UA Negative     Ketones, UA Trace     Bilirubin, UA Large (3+)     Blood, UA Negative     Protein, UA 30 mg/dL (1+)     Leuk Esterase, UA Large (3+)     Nitrite, UA Positive     Urobilinogen, UA 2.0 E.U./dL    [place] Draw [[id number]] Collected:  03/08/19 2105    Specimen:  Blood Updated:  03/09/19 0000    Narrative:       The following orders were created for panel order [place] Draw.  Procedure                               Abnormality         Status                     ---------                                -----------         ------                     Light Blue Top[[id number]]                                   Final result               Green Top (Gel)[[id number]]                                  Final result               Lavender Top[[id number]]                                     Final result               Red Top[[id number]]                                          Final result                 Please view results for these tests on the individual orders.    Green Top (Gel) [[id number]] Collected:  03/08/19 2247    Specimen:  Blood Updated:  03/09/19 0000     Extra Tube Hold for add-ons.     Comment: Auto resulted.       BNP [[id number]]  (Abnormal) Collected:  03/08/19 2246    Specimen:  Blood Updated:  03/08/19 2315     proBNP 3,160.0 pg/mL     Troponin [[id number]]  (Abnormal) Collected:  03/08/19 2246    Specimen:  Blood Updated:  03/08/19 2315     Troponin I 0.045 ng/mL     Lipase [[id number]]  (Abnormal) Collected:  03/08/19 2246    Specimen:  Blood Updated:  03/08/19 2303     Lipase 215 U/L     Comprehensive Metabolic Panel [[id number]]  (Abnormal) Collected:  03/08/19 2246    Specimen:  Blood Updated:  03/08/19 2303     Glucose 118 mg/dL      BUN 48 mg/dL      Creatinine 1.12 mg/dL      Sodium 138 mmol/L      Potassium 5.0 mmol/L      Chloride 100 mmol/L      CO2 23.0 mmol/L      Calcium 9.4 mg/dL      Total Protein 5.8 g/dL      Albumin 3.40 g/dL      ALT (SGPT) 236 U/L      AST (SGOT) 308 U/L      Alkaline Phosphatase 508 U/L      Total Bilirubin 6.2 mg/dL      eGFR Non African Amer 50 mL/min/1.73      Globulin 2.4 gm/dL      A/G Ratio 1.4 g/dL      BUN/Creatinine Ratio 42.9     Anion Gap 15.0 mmol/L     Light Blue Top [[id number]] Collected:  03/08/19 2105    Specimen:  Blood from Hand, Right Updated:  03/08/19 2215     Extra Tube hold for add-on     Comment: Auto resulted       Red Top [[id number]] Collected:  03/08/19 2105    Specimen:  Blood Updated:  03/08/19 2215     Extra Tube Hold for add-ons.      Comment: Auto resulted.       Naima Guzmán [[id number]] Collected:  03/08/19 2105    Specimen:  Blood Updated:  03/08/19 2215     Extra Tube hold for add-on     Comment: Auto resulted       CBC & Differential [[id number]] Collected:  03/08/19 2105    Specimen:  Blood Updated:  03/08/19 2146    Narrative:       The following orders were created for panel order CBC & Differential.  Procedure                               Abnormality         Status                     ---------                               -----------         ------                     CBC Auto Differential[[id number]]        Abnormal            Final result                 Please view results for these tests on the individual orders.    CBC Auto Differential [[id number]]  (Abnormal) Collected:  03/08/19 2105    Specimen:  Blood Updated:  03/08/19 2146     WBC 16.41 10*3/mm3      RBC 4.26 10*6/mm3      Hemoglobin 12.5 g/dL      Hematocrit 37.2 %      MCV 87.3 fL      MCH 29.3 pg      MCHC 33.6 g/dL      RDW 20.2 %      RDW-SD 61.5 fl      MPV 12.7 fL      Platelets 212 10*3/mm3      Neutrophil % 86.4 %      Lymphocyte % 2.1 %      Monocyte % 8.7 %      Eosinophil % 0.0 %      Basophil % 0.4 %      Immature Grans % 2.4 %      Neutrophils, Absolute 14.19 10*3/mm3      Lymphocytes, Absolute 0.34 10*3/mm3      Monocytes, Absolute 1.43 10*3/mm3      Eosinophils, Absolute 0.00 10*3/mm3      Basophils, Absolute 0.06 10*3/mm3      Immature Grans, Absolute 0.39 10*3/mm3      nRBC 0.6 /100 WBC             Radiology:     Imaging Results (last 72 hours)     Procedure Component Value Units Date/Time    US Abdomen Limited [[id number]] Collected:  03/10/19 1530     Updated:  03/10/19 1537    Narrative:       HISTORY: Right upper quadrant pain; PET/CT [facility] on 2/28/2019  didn't demonstrate intrathoracic malignancy of the left lung and pleural  space with metastatic lymphadenopathy as well as diffuse hepatic and  bony metastasis.     Right upper quadrant  ultrasound: Sonographic examination right upper  quadrant is performed.     The liver is prominent in size and heterogeneous in appearance. There  are multiple, too numerous to count hyperechoic lesions scattered  throughout the liver consistent with diffuse hepatic metastasis seen and  described on the recent PET exam. There is a small volume of ascites  seen adjacent to the liver. The gallbladder is contracted. Gallbladder  wall is mildly thickened at 3 mm which could be due to the contracted  state to the presence of ascites. No discrete gallstones identified.     There is portal hydronephrosis with questionable cavernous  transformation. No intrahepatic biliary dilatation is visualized. The  common bile but measures 5 to 6 mm, upper limits of normal.     Images of right kidney are unremarkable.       Impression:       1. Enlarged liver with too numerous to count hyperechoic foci consistent  with metastasis. Please refer to report above for description of an  outside PET exam.  2. Portal vein thrombosis. Questionable cavernous transformation.  3. Small volume of ascites adjacent to the liver. Gallbladder wall  thickening may be due to incomplete distention of the gallbladder as  well as the ascites. No gallstones identified. No intrahepatic biliary  dilatation identified. Common bile duct upper limits of normal.  This report was finalized on 03/10/2019 15:34 by Dr. Aide Jose MD.    XR Chest 2 View [[id number]] Collected:  03/08/19 2250     Updated:  03/08/19 2254    Narrative:       EXAMINATION: XR CHEST 2 VW-  3/8/2019 10:50 PM CST     TWO-VIEW CHEST:      HISTORY: Shortness of air      Frontal and lateral projection chest radiograph obtained.     COMPARISON:  2/12/2019      FINDINGS:     Left hemithorax opacification again noted.     The right lung is grossly clear.     Infusion catheter again identified.         The bony structures are intact.                                                                 "                                                  Impression:       1. Persistent left hemithorax opacification.  2. Right lung is clear.        This report was finalized on 03/08/2019 22:51 by Dr. Blas Mahajan MD.            Objective:   Vitals: /72 (BP Location: Right arm, Patient Position: Lying)   Pulse 110   Temp 97.8 °F (36.6 °C) (Oral)   Resp 18   Ht 177.8 cm (70\")   Wt 111 kg (244 lb)   LMP  (LMP Unknown)   SpO2 95%   BMI 35.01 kg/m²   Physical Exam   Constitutional: She is oriented to person, place, and time.   Chronically ill looking   HENT:   Head: Normocephalic and atraumatic.   Eyes: Scleral icterus is present.   Neck: No JVD present.   Cardiovascular: Normal rate and regular rhythm.   Pulmonary/Chest: She has no wheezes.   Diminished breath sounds, bilaterally   Abdominal: Soft. Bowel sounds are normal. She exhibits distension and mass. There is no tenderness.   Musculoskeletal: She exhibits edema.   Neurological: She is oriented to person, place, and time.   Skin:   Jaundiced.   Psychiatric:   Mood and affect appropriate for her circumstance.    Nursing note and vitals reviewed.    24HR INTAKE/OUTPUT:      Intake/Output Summary (Last 24 hours) at 3/12/2019 0725  Last data filed at 3/12/2019 0350  Gross per 24 hour   Intake 100 ml   Output 350 ml   Net -250 ml        Problem list:       Failure to thrive in adult      Assessment/Plan:       ASSESSMENT:   1.   Failure to thrive due to progressive and advanced non-small cell lung malignancy.  2.   Poor performance status of 4.     3.   Multiple liver metastasis.   4.   Jaundice from number 3.   5.   Leukocytosis, reactive.   6.   Portal vein thrombosis from advanced malignancy.  7.   Abdominal pain from liver mets and portal vein thrombosis.     CONCURRENT PROBLEMS:   1.    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  Tumor [place]:  " 3.7 cm left upper lobe mass with associated 3.1 cm left hilar node.  Complications of Tumor:  None.  Tumor Status:  Definitive chemo + radiation completed.  PET scan of 11/24/2014 compared with study from 05/12/2014 (above). The previously seen left upper lobe mass has significantly decreased in size (previously 3.7 cm, now 0.8 cm) and now demonstrates only faintly increased metabolic activity. This previously demonstrated a maximum SUV of 13.7 and now demonstrates a maximum SUV of 1.9. The previously identified left hilar lymph node is no longer visualized and demonstrates no significantly increased metabolic activity as compared to background.  PET scan performed on 03/27/2015 at [facility] was revealing for some diffuse activity in the left upper lobe region of treatment with mildly increased metabolic activity noted as described above most likely related to post treatment changes. Continued followup will be required for verification, however.   CT scan of the chest, 06/15/2016 ([facility])  Increasing collapse in left upper lobe since 02/18/2016.  There is a new left pleural effusion since that time.  No specific etiology and no residual malignancy is identified.  [facility], CT of the chest, 10/20/2016. Impression: Persistent chronic atelectasis in the left upper posterior lung. Resolution of a small left pleural effusion since 06/15/2016. No other significant interval change.  Chest x-ray, [facility] 02/16/2017.  Impression:  Probable radiation fibrosis left upper medial lung.  Chest x-ray, [facility] 07/06/2017.  Impression:  No acute pathology in the chest.  Stable atelectasis or fibrothorax in the left upper medial lung.  Chest x-ray, [facility] 11/22/2017.  Impression:  No acute pathology in the chest.   Chest x ray 05/16/2018 at [facility] showed no acute pathology in the chest.    Chest x-ray done on 11/14/2018 at [facility]. Impression:  No acute pathology in the chest   RELAPSE: CT angiogram of the lung with contrast on 01/25/2019 revealed a very large left pleural effusion with left lung atelectasis/collapse. Mild tension with mass-effect and mild left-to-right midline shift of the mediastinal structures. Primary differential considerations include a malignant effusion. Developing mediastinal lymphadenopathy (large right paratracheal lymph node compatible with metastatic adenopathy measuring 2.5 x 2.6 cm, left paratracheal lymphadenopathy in the precarinal/AP window measuring 1.2 cm, subcarinal adenopathy 2.2 cm, left paratracheal node 1.4 cm). A 12 mm irregular pulmonary nodule right lung base medially, pulmonary metastasis considered.  Developing upper abdominal/retrocrural lymphadenopathy. No pulmonary embolism or acute aortic pathology.   RELAPSE: Chest 2 views 2/12/2019.  FINDINGS: Today's exam is compared to previous study of 9/3/2014. There is complete opacification of the left hemithorax with associated volume loss and mediastinal shift to the left. An air-fluid level is noted within the left lung apex. Follow-up with CT imaging of the chest may be helpful for better characterization of the etiology of the opacification. The right lung is fully expanded and clear except for minimal effusion. 1. Complete opacification of the left hemithorax with associated volume loss suggesting partial collapse of the left lung. There is a small amount of air within the left lung apex. Follow-up with CT imaging may be helpful for better characterization of the findings. 2. Tiny right effusion with the right lung otherwise clear.  PET 02/28/2019. Intrathoracic malignancy with abnormal FDG uptake seen throughout the compressed left lung parenchyma as well as the left pleural space. There are hypermetabolic metastatic lymph nodes of the mediastinum and the lower neck.  2. Diffuse hepatic metastasis. 3. Bony metastasis. 4. Heterogeneous activity of the  gray-white matter interface of the base the brain. Findings worrisome for potential intracranial metastasis. Follow-up MRI brain with and without contrast should be  considered.   Brain MRI 03/08/2019 showed no evidence of metastatic involvement of the brain.  2.    Abnormal CEA, 28.8 on 02/12/2019.  Stable (prior range:  4.2 - 7.2).  CT chest, 10/20/2016 YA and colonoscopy, 09/21/2015 (see above).  3.    Leukocytosis with neutrophilia.  Reactive  4.    Mild normocytic anemia, new onset.  5.    Acute renal injury?  GFR 45 mL/min, 02/12/2019. Stable, 47 mL/min, 03/10/2019  6.    Elevated liver enzymes, NOS  7.    Shoulder and back pains.  MRI cervical spine and thoracic spine, 10/14/2014 (above) show no metastasis to the spine but with early-to-moderate cervical spondylosis, including a small left paracentral disk herniation at C5-C6.  8.    Severe chronic obstructive pulmonary disease (COPD).    9.    Atrial fibrillation on maintenance Coumadin (Dr. Seth).  10.  Anxiety.  11.  Depression.  12.  History of deep venous thrombosis (DVT).  13.  Hyperlipidemia.  14.  Morbid obesity.  15.  Degenerative disk disease with cervical spine MRI, 10/14/2014.  Overall change of early-to-moderate cervical spondylosis, including a small left paracentral disk herniation at C5-C6.  16.  Headaches, migraines.  Unchanged.  17.  Reflux associated cough.  Modulated by omeprazole.  18.  Borderline non-fasting hyperglycemia.  19.  Symptomatic kidney stone and was scheduled for lithotripsy and stent placement on 05/21/2018 - Dr. Landaverde.      PLAN:   1.   Discussed new diagnosis of non-small cell lung carcinoma and progression of disease.  She is too weak and too ill such that she is not a candidate for palliative chemo.  Note of Dr. Flowers reviewed.  Agree with best supportive care directed by hospice. It has been elected not to anticoagulate for portal vein thrombosis due to her very poor overall prognosis.   2.    Re:  Hospice  "care.  She confirms agreement as discussed with Dr. Flowers. No palliative chest radiation due to her very poor overall condition.  Has been discussed by Dr. Flowers (as per patient request) with her friend Kathie.  \"She is so sick and I expected no more treatments.\"   Had offered to call her daughter Kimberly, she declined.  \"I want you to call Kathie.\"        3.   Hospice consulted.  DNR in place.   4.   No clinic appointment and no further oncologic workup.     "

## 2019-03-13 VITALS
SYSTOLIC BLOOD PRESSURE: 147 MMHG | HEIGHT: 70 IN | OXYGEN SATURATION: 97 % | DIASTOLIC BLOOD PRESSURE: 79 MMHG | HEART RATE: 110 BPM | WEIGHT: 244 LBS | RESPIRATION RATE: 18 BRPM | TEMPERATURE: 98.4 F | BODY MASS INDEX: 34.93 KG/M2

## 2019-03-13 PROCEDURE — 25010000002 CEFTRIAXONE PER 250 MG: Performed by: INTERNAL MEDICINE

## 2019-03-13 PROCEDURE — 25010000002 DEXAMETHASONE PER 1 MG: Performed by: INTERNAL MEDICINE

## 2019-03-13 PROCEDURE — 94760 N-INVAS EAR/PLS OXIMETRY 1: CPT

## 2019-03-13 PROCEDURE — 94799 UNLISTED PULMONARY SVC/PX: CPT

## 2019-03-13 RX ORDER — MORPHINE SULFATE 20 MG/ML
5 SOLUTION ORAL
Qty: 30 ML | Refills: 0 | Status: SHIPPED | OUTPATIENT
Start: 2019-03-13

## 2019-03-13 RX ORDER — FENTANYL 25 UG/H
1 PATCH TRANSDERMAL
Qty: 2 PATCH | Refills: 0 | Status: SHIPPED | OUTPATIENT
Start: 2019-03-15 | End: 2019-03-19

## 2019-03-13 RX ORDER — DEXAMETHASONE 4 MG/1
4 TABLET ORAL
Qty: 12 TABLET | Refills: 0 | Status: SHIPPED | OUTPATIENT
Start: 2019-03-13

## 2019-03-13 RX ORDER — MORPHINE SULFATE 20 MG/ML
5 SOLUTION ORAL
Qty: 30 ML | Refills: 0 | Status: SHIPPED | OUTPATIENT
Start: 2019-03-13 | End: 2019-03-13

## 2019-03-13 RX ADMIN — DEXAMETHASONE SODIUM PHOSPHATE 8 MG: 4 INJECTION, SOLUTION INTRA-ARTICULAR; INTRALESIONAL; INTRAMUSCULAR; INTRAVENOUS; SOFT TISSUE at 05:06

## 2019-03-13 RX ADMIN — FENTANYL 1 PATCH: 25 PATCH, EXTENDED RELEASE TRANSDERMAL at 11:22

## 2019-03-13 RX ADMIN — CEFTRIAXONE SODIUM 1 G: 1 INJECTION, POWDER, FOR SOLUTION INTRAMUSCULAR; INTRAVENOUS at 04:13

## 2019-03-13 NOTE — PAYOR COMM NOTE
"[facility]  BHARATHI   [phone]  OR   FAX   [phone]    REF: [id number]     Cary Ferris (58 y.o. Female)     Date of Birth Social Security Number Address Home Phone MRN    1960  [id number]   [facility] [place] [address] [phone] [id number]    Christian Marital Status          Anglican        Admission Date Admission Type Admitting Provider Attending Provider Department, Room/Bed    3/8/19 Emergency Alicia Jaquez DO  [facility] 3A, 334/1    Discharge Date Discharge Disposition Discharge Destination        3/13/2019 Hospice/Medical Facility (DC - External)              Attending Provider:  (none)   Allergies:  Codeine, Penicillins, Ace Inhibitors    Isolation:  None   Infection:  None   Code Status:  No CPR    Ht:  177.8 cm (70\")   Wt:  111 kg (244 lb)    Admission Cmt:  None   Principal Problem:  None                Active Insurance as of 3/8/2019     Primary Coverage     Payor Plan Insurance Group Employer/Plan Group    [employer] MEDICARE REPLACEMENT [employer] [id number]     Payor Plan Address Payor Plan Phone Number Payor Plan Fax Number Effective Dates    [address] [address]   1/1/2019 - None Entered    [facility] [id number]       Subscriber Name Subscriber Birth Date Member ID       Cary Ferris 1960 [id number]                 Emergency Contacts      (Rel.) Home Phone Work Phone Mobile Phone    Kimberly Cespedes (Sister) [phone] -- --    Kathie Durán (Friend) -- -- [phone]               Discharge Summary      Alicia Jaquez DO at 3/13/2019 10:32 AM                [facility]  DISCHARGE SUMMARY       Date of Admission: 3/8/2019  Date of Discharge:  3/13/2019  Primary Care Physician: Aide Garces DO    Presenting Problem/History of Present Illness:  Weakness      Final Discharge Diagnoses:  Lung cancer, poorly differentiated high grade carcinoma " with mets  Left lung opacification/colapse  COPD severe  Afib  Hx DVT  Failure to thrive in adult secondary to know cancer        Consults:   Oncology  Palliative care  Hospice      Procedures Performed:   None  Pertinent Test Results:   Imaging Results (last 7 days)     Procedure Component Value Units Date/Time    US Abdomen Limited [[id number]] Collected:  03/10/19 1530     Updated:  03/10/19 1537    Narrative:       HISTORY: Right upper quadrant pain; PET/CT [facility] on 2/28/2019  didn't demonstrate intrathoracic malignancy of the left lung and pleural  space with metastatic lymphadenopathy as well as diffuse hepatic and  bony metastasis.     Right upper quadrant ultrasound: Sonographic examination right upper  quadrant is performed.     The liver is prominent in size and heterogeneous in appearance. There  are multiple, too numerous to count hyperechoic lesions scattered  throughout the liver consistent with diffuse hepatic metastasis seen and  described on the recent PET exam. There is a small volume of ascites  seen adjacent to the liver. The gallbladder is contracted. Gallbladder  wall is mildly thickened at 3 mm which could be due to the contracted  state to the presence of ascites. No discrete gallstones identified.     There is portal hydronephrosis with questionable cavernous  transformation. No intrahepatic biliary dilatation is visualized. The  common bile but measures 5 to 6 mm, upper limits of normal.     Images of right kidney are unremarkable.       Impression:       1. Enlarged liver with too numerous to count hyperechoic foci consistent  with metastasis. Please refer to report above for description of an  outside PET exam.  2. Portal vein thrombosis. Questionable cavernous transformation.  3. Small volume of ascites adjacent to the liver. Gallbladder wall  thickening may be due to incomplete distention of the gallbladder as  well as the ascites. No gallstones identified. No intrahepatic  biliary  dilatation identified. Common bile duct upper limits of normal.  This report was finalized on 03/10/2019 15:34 by Dr. Aide Jose MD.    XR Chest 2 View [[id number]] Collected:  03/08/19 2250     Updated:  03/08/19 2254    Narrative:       EXAMINATION: XR CHEST 2 VW-  3/8/2019 10:50 PM CST     TWO-VIEW CHEST:      HISTORY: Shortness of air      Frontal and lateral projection chest radiograph obtained.     COMPARISON:  2/12/2019      FINDINGS:     Left hemithorax opacification again noted.     The right lung is grossly clear.     Infusion catheter again identified.         The bony structures are intact.                                                                                                                  Impression:       1. Persistent left hemithorax opacification.  2. Right lung is clear.        This report was finalized on 03/08/2019 22:51 by Dr. Blas Mahajan MD.        Lab Results (last 7 days)     Procedure Component Value Units Date/Time    Urine Culture - Urine, Urine, Clean Catch [[id number]]  (Abnormal)  (Susceptibility) Collected:  03/09/19 0010    Specimen:  Urine, Clean Catch Updated:  03/11/19 0703     Urine Culture 50,000-60,000 CFU/mL Escherichia coli      10,000-20,000 CFU/mL Mixed Gram Positive Georgina     Comment: Probable contaminant.         Susceptibility      Escherichia coli     PRABHA     Ampicillin Resistant     Ampicillin + Sulbactam Intermediate     Cefazolin Susceptible     Cefepime Susceptible     Ceftriaxone Susceptible     Ertapenem Susceptible     ESBL Confirmation Test Negative     Gentamicin Susceptible     Levofloxacin Resistant     Meropenem Susceptible     Nitrofurantoin Susceptible     Piperacillin + Tazobactam Susceptible     Trimethoprim + Sulfamethoxazole Resistant                    Basic Metabolic Panel [[id number]]  (Abnormal) Collected:  03/10/19 0459    Specimen:  Blood Updated:  03/10/19 0544     Glucose 121 mg/dL      BUN 48 mg/dL      Creatinine 1.18  mg/dL      Sodium 139 mmol/L      Potassium 4.6 mmol/L      Chloride 99 mmol/L      CO2 27.0 mmol/L      Calcium 9.3 mg/dL      eGFR Non African Amer 47 mL/min/1.73      BUN/Creatinine Ratio 40.7     Anion Gap 13.0 mmol/L     Narrative:       GFR Normal >60  Chronic Kidney Disease <60  Kidney Failure <15    CBC & Differential [[id number]] Collected:  03/10/19 0459    Specimen:  Blood Updated:  03/10/19 0524    Narrative:       The following orders were created for panel order CBC & Differential.  Procedure                               Abnormality         Status                     ---------                               -----------         ------                     CBC Auto Differential[[id number]]        Abnormal            Final result                 Please view results for these tests on the individual orders.    CBC Auto Differential [[id number]]  (Abnormal) Collected:  03/10/19 0459    Specimen:  Blood Updated:  03/10/19 0524     WBC 14.37 10*3/mm3      RBC 4.22 10*6/mm3      Hemoglobin 12.3 g/dL      Hematocrit 37.0 %      MCV 87.7 fL      MCH 29.1 pg      MCHC 33.2 g/dL      RDW 21.0 %      RDW-SD 63.5 fl      MPV 12.7 fL      Platelets 155 10*3/mm3      Neutrophil % 86.4 %      Lymphocyte % 2.1 %      Monocyte % 9.0 %      Eosinophil % 0.1 %      Basophil % 0.3 %      Immature Grans % 2.1 %      Neutrophils, Absolute 12.42 10*3/mm3      Lymphocytes, Absolute 0.30 10*3/mm3      Monocytes, Absolute 1.30 10*3/mm3      Eosinophils, Absolute 0.01 10*3/mm3      Basophils, Absolute 0.04 10*3/mm3      Immature Grans, Absolute 0.30 10*3/mm3      nRBC 0.4 /100 WBC     Comprehensive Metabolic Panel [[id number]]  (Abnormal) Collected:  03/09/19 0521    Specimen:  Blood Updated:  03/09/19 0616     Glucose 100 mg/dL      BUN 48 mg/dL      Creatinine 1.21 mg/dL      Sodium 138 mmol/L      Potassium 4.7 mmol/L      Chloride 98 mmol/L      CO2 23.0 mmol/L      Calcium 9.6 mg/dL      Total Protein 5.7 g/dL      Albumin 3.20  g/dL      ALT (SGPT) 221 U/L      AST (SGOT) 282 U/L      Alkaline Phosphatase 465 U/L      Total Bilirubin 6.4 mg/dL      eGFR Non African Amer 46 mL/min/1.73      Globulin 2.5 gm/dL      A/G Ratio 1.3 g/dL      BUN/Creatinine Ratio 39.7     Anion Gap 17.0 mmol/L     Protime-INR [[id number]]  (Abnormal) Collected:  03/09/19 0521    Specimen:  Blood Updated:  03/09/19 0613     Protime 19.2 Seconds      INR 1.56    CBC (No Diff) [[id number]]  (Abnormal) Collected:  03/09/19 0521    Specimen:  Blood Updated:  03/09/19 0606     WBC 15.98 10*3/mm3      RBC 4.14 10*6/mm3      Hemoglobin 12.1 g/dL      Hematocrit 36.6 %      MCV 88.4 fL      MCH 29.2 pg      MCHC 33.1 g/dL      RDW 20.3 %      RDW-SD 62.4 fl      MPV 13.3 fL      Platelets 176 10*3/mm3     Basic Metabolic Panel [[id number]]  (Abnormal) Collected:  03/09/19 0336    Specimen:  Blood Updated:  03/09/19 0409     Glucose 100 mg/dL      BUN 49 mg/dL      Creatinine 1.16 mg/dL      Sodium 137 mmol/L      Potassium 4.8 mmol/L      Chloride 99 mmol/L      CO2 21.0 mmol/L      Calcium 9.6 mg/dL      eGFR Non African Amer 48 mL/min/1.73      BUN/Creatinine Ratio 42.2     Anion Gap 17.0 mmol/L     Narrative:       GFR Normal >60  Chronic Kidney Disease <60  Kidney Failure <15    CBC Auto Differential [[id number]]  (Abnormal) Collected:  03/09/19 0336    Specimen:  Blood Updated:  03/09/19 0405     WBC 15.94 10*3/mm3      RBC 4.14 10*6/mm3      Hemoglobin 12.5 g/dL      Hematocrit 37.2 %      MCV 89.9 fL      MCH 30.2 pg      MCHC 33.6 g/dL      RDW 20.6 %      RDW-SD 63.9 fl      MPV 12.7 fL      Platelets 187 10*3/mm3      Neutrophil % 86.2 %      Lymphocyte % 2.1 %      Monocyte % 9.5 %      Eosinophil % 0.1 %      Basophil % 0.2 %      Immature Grans % 1.9 %      Neutrophils, Absolute 13.75 10*3/mm3      Lymphocytes, Absolute 0.34 10*3/mm3      Monocytes, Absolute 1.51 10*3/mm3      Eosinophils, Absolute 0.01 10*3/mm3      Basophils, Absolute 0.03 10*3/mm3       Immature Grans, Absolute 0.30 10*3/mm3      nRBC 0.5 /100 WBC     Urinalysis, Microscopic Only - Urine, Clean Catch [[id number]]  (Abnormal) Collected:  03/09/19 0010    Specimen:  Urine, Clean Catch Updated:  03/09/19 0038     RBC, UA 0-2 /HPF      WBC, UA 21-30 /HPF      Bacteria, UA 2+ /HPF      Squamous Epithelial Cells, UA 3-6 /HPF      Hyaline Casts, UA 0-2 /LPF      Methodology Manual Light Microscopy    Urinalysis With Culture If Indicated - Urine, Clean Catch [[id number]]  (Abnormal) Collected:  03/09/19 0010    Specimen:  Urine, Clean Catch Updated:  03/09/19 0038     Color, UA Dark Yellow     Appearance, UA Cloudy     pH, UA 5.5     Specific Gravity, UA 1.024     Glucose, UA Negative     Ketones, UA Trace     Bilirubin, UA Large (3+)     Blood, UA Negative     Protein, UA 30 mg/dL (1+)     Leuk Esterase, UA Large (3+)     Nitrite, UA Positive     Urobilinogen, UA 2.0 E.U./dL    [place] Draw [[id number]] Collected:  03/08/19 2105    Specimen:  Blood Updated:  03/09/19 0000    Narrative:       The following orders were created for panel order [place] Draw.  Procedure                               Abnormality         Status                     ---------                               -----------         ------                     Light Blue Top[[id number]]                                   Final result               Green Top (Gel)[[id number]]                                  Final result               Lavender Top[[id number]]                                     Final result               Red Top[[id number]]                                          Final result                 Please view results for these tests on the individual orders.    Green Top (Gel) [[id number]] Collected:  03/08/19 2247    Specimen:  Blood Updated:  03/09/19 0000     Extra Tube Hold for add-ons.     Comment: Auto resulted.       BNP [[id number]]  (Abnormal) Collected:  03/08/19 2246    Specimen:  Blood Updated:  03/08/19 2315     proBNP  3,160.0 pg/mL     Troponin [[id number]]  (Abnormal) Collected:  03/08/19 2246    Specimen:  Blood Updated:  03/08/19 2315     Troponin I 0.045 ng/mL     Lipase [[id number]]  (Abnormal) Collected:  03/08/19 2246    Specimen:  Blood Updated:  03/08/19 2303     Lipase 215 U/L     Comprehensive Metabolic Panel [[id number]]  (Abnormal) Collected:  03/08/19 2246    Specimen:  Blood Updated:  03/08/19 2303     Glucose 118 mg/dL      BUN 48 mg/dL      Creatinine 1.12 mg/dL      Sodium 138 mmol/L      Potassium 5.0 mmol/L      Chloride 100 mmol/L      CO2 23.0 mmol/L      Calcium 9.4 mg/dL      Total Protein 5.8 g/dL      Albumin 3.40 g/dL      ALT (SGPT) 236 U/L      AST (SGOT) 308 U/L      Alkaline Phosphatase 508 U/L      Total Bilirubin 6.2 mg/dL      eGFR Non African Amer 50 mL/min/1.73      Globulin 2.4 gm/dL      A/G Ratio 1.4 g/dL      BUN/Creatinine Ratio 42.9     Anion Gap 15.0 mmol/L     Light Blue Top [[id number]] Collected:  03/08/19 2105    Specimen:  Blood from Hand, Right Updated:  03/08/19 2215     Extra Tube hold for add-on     Comment: Auto resulted       Red Top [[id number]] Collected:  03/08/19 2105    Specimen:  Blood Updated:  03/08/19 2215     Extra Tube Hold for add-ons.     Comment: Auto resulted.       Lavender Top [[id number]] Collected:  03/08/19 2105    Specimen:  Blood Updated:  03/08/19 2215     Extra Tube hold for add-on     Comment: Auto resulted       CBC & Differential [[id number]] Collected:  03/08/19 2105    Specimen:  Blood Updated:  03/08/19 2146    Narrative:       The following orders were created for panel order CBC & Differential.  Procedure                               Abnormality         Status                     ---------                               -----------         ------                     CBC Auto Differential[[id number]]        Abnormal            Final result                 Please view results for these tests on the individual orders.    CBC Auto Differential [[id number]]   "(Abnormal) Collected:  03/08/19 2105    Specimen:  Blood Updated:  03/08/19 2146     WBC 16.41 10*3/mm3      RBC 4.26 10*6/mm3      Hemoglobin 12.5 g/dL      Hematocrit 37.2 %      MCV 87.3 fL      MCH 29.3 pg      MCHC 33.6 g/dL      RDW 20.2 %      RDW-SD 61.5 fl      MPV 12.7 fL      Platelets 212 10*3/mm3      Neutrophil % 86.4 %      Lymphocyte % 2.1 %      Monocyte % 8.7 %      Eosinophil % 0.0 %      Basophil % 0.4 %      Immature Grans % 2.4 %      Neutrophils, Absolute 14.19 10*3/mm3      Lymphocytes, Absolute 0.34 10*3/mm3      Monocytes, Absolute 1.43 10*3/mm3      Eosinophils, Absolute 0.00 10*3/mm3      Basophils, Absolute 0.06 10*3/mm3      Immature Grans, Absolute 0.39 10*3/mm3      nRBC 0.6 /100 WBC         Hospital Course:  The patient is a 58 y.o. female who presented to [facility] with generalized weakness and failure to thrive.   Her oncologist was consulted.  Her disease noted to have progressed and no further treatment options available.   Hospice consulted.   Patient accepted as patient.  Discharged home with hospice.       Physical Exam on Discharge:  /79 (BP Location: Right arm, Patient Position: Lying)   Pulse 110   Temp 98.4 °F (36.9 °C) (Oral)   Resp 18   Ht 177.8 cm (70\")   Wt 111 kg (244 lb)   LMP  (LMP Unknown)   SpO2 97%   BMI 35.01 kg/m²    Physical Exam   Constitutional: She appears well-developed and well-nourished.   HENT:   Head: Normocephalic and atraumatic.   Eyes: Conjunctivae and EOM are normal. Pupils are equal, round, and reactive to light.   Neck: Neck supple.   Cardiovascular: Normal rate and regular rhythm. Exam reveals no gallop and no friction rub.   No murmur heard.  Pulmonary/Chest: Effort normal and breath sounds normal.   Abdominal: Soft. Bowel sounds are normal. There is no hepatosplenomegaly.   Musculoskeletal: She exhibits no edema.   Neurological: No cranial nerve deficit.   Arouses to verbal stimuli.   Skin: Skin is warm and dry. "   Psychiatric:   Lethargic.    Nursing note and vitals reviewed.        Condition on Discharge:   Unchanged.  Home with hospice.     Discharge Disposition:  Hospice/Medical Facility (DC - External)    Discharge Medications:     Discharge Medications      New Medications      Instructions Start Date   dexamethasone 4 MG tablet  Commonly known as:  DECADRON   4 mg, Oral, 4 Times Daily After Meals & at Bedtime      fentaNYL 25 MCG/HR patch  Commonly known as:  DURAGESIC   1 patch, Transdermal, Every 72 Hours      morphine 100 MG/5ML solution concentrated solution   5 mg, Oral, Every 2 Hours PRN         Continue These Medications      Instructions Start Date   albuterol (2.5 MG/3ML) 0.083% nebulizer solution  Commonly known as:  PROVENTIL   2.5 mg, Nebulization, Every 4 Hours PRN      albuterol sulfate  (90 Base) MCG/ACT inhaler  Commonly known as:  PROVENTIL HFA;VENTOLIN HFA;PROAIR HFA   2 puffs, Inhalation, Every 4 Hours PRN      celecoxib 200 MG capsule  Commonly known as:  CeleBREX   200 mg, Oral, Daily      diclofenac 1 % gel gel  Commonly known as:  VOLTAREN   4 g, Topical, 4 Times Daily PRN      HYDROcodone-acetaminophen  MG per tablet  Commonly known as:  NORCO   1 tablet, Oral, Every 6 Hours PRN      LORazepam 1 MG tablet  Commonly known as:  ATIVAN   1 mg, Oral, Every 8 Hours PRN      losartan 50 MG tablet  Commonly known as:  COZAAR   100 mg, Oral, Daily      polyethylene glycol packet  Commonly known as:  MIRALAX   17 g, Oral, Daily      promethazine 25 MG tablet  Commonly known as:  PHENERGAN   No dose, route, or frequency recorded.      raNITIdine 150 MG tablet  Commonly known as:  ZANTAC   150 mg, Oral, 2 Times Daily      Sennosides 8.6 MG capsule   Oral, As Needed      sertraline 100 MG tablet  Commonly known as:  ZOLOFT   100 mg, Oral, Daily      sotalol 160 MG tablet  Commonly known as:  BETAPACE   160 mg, Oral, 2 Times Daily      temazepam 30 MG capsule  Commonly known as:  RESTORIL   30  mg, Oral, Nightly PRN      tiZANidine 4 MG tablet  Commonly known as:  ZANAFLEX   4 mg, Oral, Nightly PRN      VITAMIN B COMPLEX PO   Oral           Discharge Diet:   Diet Instructions     Diet: Regular      Discharge Diet:  Regular        Activity at Discharge:   Activity Instructions     Activity as Tolerated            Follow-up Appointments:   Hospice will follow    Test Results Pending at Discharge: none    Alicia Jaquez DO  03/13/19  10:33 AM    Time: 35 minutes    Electronically signed by Alicia Jaquez DO at 3/13/2019 10:37 AM

## 2019-03-13 NOTE — PROGRESS NOTES
Continued Stay Note  [facility]     Patient Name: Cary Ferris  MRN: [id number]  Today's Date: 3/13/2019    Admit Date: 3/8/2019    Discharge Plan     Row Name 03/13/19 1009       Plan    Plan Comments  TANYA spoke with Lindsay from [facility] and friend, Kathie, who will be caring for pt. Equipment is set up and pt is ready to return home with [facility] when ready for discharge. TANYA will follow.       Final Discharge Disposition Code  50 - home with hospice         Discharge Codes    No documentation.             Bettye Davila

## 2019-03-13 NOTE — THERAPY DISCHARGE NOTE
Acute Care - Physical Therapy Discharge Summary  [facility]       Patient Name: Cary Ferris  : 1960  MRN: [id number]    Today's Date: 3/13/2019  Onset of Illness/Injury or Date of Surgery: 19    Date of Referral to PT: 19  Referring Physician: Dr. Luong      Admit Date: 3/8/2019      PT Recommendation and Plan    Visit Dx:    ICD-10-CM ICD-9-CM   1. Failure to thrive in adult R62.7 783.7   2. NSTEMI (non-ST elevated myocardial infarction) (CMS/[facility]) I21.4 410.70   3. Impaired mobility and ADLs Z74.09 799.89   4. Impaired functional mobility and activity tolerance Z74.09 V49.89       Outcome Measures     Row Name 19 1200 19 1500 19 1100       How much help from another person do you currently need...    Turning from your back to your side while in flat bed without using bedrails?  --  3  -SB  --    Moving from lying on back to sitting on the side of a flat bed without bedrails?  --  3  -SB  --    Moving to and from a bed to a chair (including a wheelchair)?  --  2  -SB  --    Standing up from a chair using your arms (e.g., wheelchair, bedside chair)?  --  3  -SB  --    Climbing 3-5 steps with a railing?  --  1  -SB  --    To walk in hospital room?  --  1  -SB  --    AM-PAC 6 Clicks Score  --  13  -SB  --       How much help from another is currently needed...    Putting on and taking off regular lower body clothing?  1  -TS  --  1  -MW    Bathing (including washing, rinsing, and drying)  2  -TS  --  2  -MW    Toileting (which includes using toilet bed pan or urinal)  1  -TS  --  2  -MW    Putting on and taking off regular upper body clothing  2  -TS  --  2  -MW    Taking care of personal grooming (such as brushing teeth)  2  -TS  --  2  -MW    Eating meals  3  -TS  --  3  -MW    Score  11  -TS  --  12  -MW       Functional Assessment    Outcome Measure Options  AM-PAC 6 Clicks Daily Activity (OT)  -TS  AM-PAC 6 Clicks Basic Mobility (PT)  -SB  AM-PAC 6 Clicks Daily  Activity (OT)  -      User Key  (r) = Recorded By, (t) = Taken By, (c) = Cosigned By    Initials Name Provider Type    TS Vonnie Zarco, TIDWELL/L Occupational Therapy Assistant    Lynnette Ma, OTR/L Occupational Therapist    SB Suzette Muhammad, PT Physical Therapist            Therapy Suggested Charges     Code   Minutes Charges    None             Rehab Goal Summary     Row Name 03/13/19 1559             Physical Therapy Goals    Bed Mobility Goal Selection (PT)  bed mobility, PT goal 1  -AH      Transfer Goal Selection (PT)  transfer, PT goal 1;transfer, PT goal 2  -AH      Gait Training Goal Selection (PT)  gait training, PT goal 1  -AH         Bed Mobility Goal 1 (PT)    Activity/Assistive Device (Bed Mobility Goal 1, PT)  bed mobility activities, all  -AH      [place] Level/Cues Needed (Bed Mobility Goal 1, PT)  supervision required  -      Time Frame (Bed Mobility Goal 1, PT)  by discharge  -      Progress/Outcomes (Bed Mobility Goal 1, PT)  goal not met  -         Transfer Goal 1 (PT)    Activity/Assistive Device (Transfer Goal 1, PT)  sit-to-stand/stand-to-sit;walker, rolling  -AH      [place] Level/Cues Needed (Transfer Goal 1, PT)  contact guard assist  -AH      Time Frame (Transfer Goal 1, PT)  by discharge  -      Progress/Outcome (Transfer Goal 1, PT)  goal not met  -         Transfer Goal 2 (PT)    Activity/Assistive Device (Transfer Goal 2, PT)  bed-to-chair/chair-to-bed;walker, rolling  -      [place] Level/Cues Needed (Transfer Goal 2, PT)  minimum assist (75% or more patient effort)  -      Time Frame (Transfer Goal 2, PT)  by discharge  -      Progress/Outcome (Transfer Goal 2, PT)  goal not met  -        User Key  (r) = Recorded By, (t) = Taken By, (c) = Cosigned By    Initials Name Provider Type [facility]    Nica Potts, PTA Physical Therapy Assistant PT              PT Discharge Summary  Reason for Discharge: Discharge from  facility  Outcomes Achieved: Refer to plan of care for updates on goals achieved  Discharge Destination: Home with assist, other (comment)(HOSPICE)      Nica Arnold, PTA   3/13/2019

## 2019-03-13 NOTE — PLAN OF CARE
Problem: Patient Care Overview  Goal: Plan of Care Review  Outcome: Ongoing (interventions implemented as appropriate)   03/13/19 0533   Coping/Psychosocial   Plan of Care Reviewed With patient   Plan of Care Review   Progress no change   OTHER   Outcome Summary Alert to self. Poor appetite. Medicated for pain as needed. Turned atleast every 2 hours. Skin looking better, more pink and blanchable that bright red. Not to pleasant.        Problem: Fall Risk (Adult)  Goal: Absence of Fall  Outcome: Ongoing (interventions implemented as appropriate)      Problem: Skin Injury Risk (Adult)  Goal: Skin Health and Integrity  Outcome: Ongoing (interventions implemented as appropriate)      Problem: Nutrition, Imbalanced: Inadequate Oral Intake (Adult)  Goal: Improved Oral Intake  Outcome: Ongoing (interventions implemented as appropriate)    Goal: Prevent Further Weight Loss  Outcome: Ongoing (interventions implemented as appropriate)      Problem: Pain, Acute (Adult)  Goal: Acceptable Pain Control/Comfort Level  Outcome: Ongoing (interventions implemented as appropriate)      Problem: Oncology Care (Adult)  Goal: Signs and Symptoms of Listed Potential Problems Will be Absent, Minimized or Managed (Oncology Care)  Outcome: Ongoing (interventions implemented as appropriate)

## 2019-03-13 NOTE — DISCHARGE SUMMARY
[facility] Medicine Services  DISCHARGE SUMMARY       Date of Admission: 3/8/2019  Date of Discharge:  3/13/2019  Primary Care Physician: Aide Garces DO    Presenting Problem/History of Present Illness:  Weakness      Final Discharge Diagnoses:  Lung cancer, poorly differentiated high grade carcinoma with mets  Left lung opacification/colapse  COPD severe  Afib  Hx DVT  Failure to thrive in adult secondary to know cancer        Consults:   Oncology  Palliative care  Hospice      Procedures Performed:   None  Pertinent Test Results:   Imaging Results (last 7 days)     Procedure Component Value Units Date/Time    US Abdomen Limited [[id number]] Collected:  03/10/19 1530     Updated:  03/10/19 1537    Narrative:       HISTORY: Right upper quadrant pain; PET/CT [facility] on 2/28/2019  didn't demonstrate intrathoracic malignancy of the left lung and pleural  space with metastatic lymphadenopathy as well as diffuse hepatic and  bony metastasis.     Right upper quadrant ultrasound: Sonographic examination right upper  quadrant is performed.     The liver is prominent in size and heterogeneous in appearance. There  are multiple, too numerous to count hyperechoic lesions scattered  throughout the liver consistent with diffuse hepatic metastasis seen and  described on the recent PET exam. There is a small volume of ascites  seen adjacent to the liver. The gallbladder is contracted. Gallbladder  wall is mildly thickened at 3 mm which could be due to the contracted  state to the presence of ascites. No discrete gallstones identified.     There is portal hydronephrosis with questionable cavernous  transformation. No intrahepatic biliary dilatation is visualized. The  common bile but measures 5 to 6 mm, upper limits of normal.     Images of right kidney are unremarkable.       Impression:       1. Enlarged liver with too numerous to count hyperechoic foci consistent  with  metastasis. Please refer to report above for description of an  outside PET exam.  2. Portal vein thrombosis. Questionable cavernous transformation.  3. Small volume of ascites adjacent to the liver. Gallbladder wall  thickening may be due to incomplete distention of the gallbladder as  well as the ascites. No gallstones identified. No intrahepatic biliary  dilatation identified. Common bile duct upper limits of normal.  This report was finalized on 03/10/2019 15:34 by Dr. Aide Jose MD.    XR Chest 2 View [[id number]] Collected:  03/08/19 2250     Updated:  03/08/19 2254    Narrative:       EXAMINATION: XR CHEST 2 VW-  3/8/2019 10:50 PM CST     TWO-VIEW CHEST:      HISTORY: Shortness of air      Frontal and lateral projection chest radiograph obtained.     COMPARISON:  2/12/2019      FINDINGS:     Left hemithorax opacification again noted.     The right lung is grossly clear.     Infusion catheter again identified.         The bony structures are intact.                                                                                                                  Impression:       1. Persistent left hemithorax opacification.  2. Right lung is clear.        This report was finalized on 03/08/2019 22:51 by Dr. Blas Mahajan MD.        Lab Results (last 7 days)     Procedure Component Value Units Date/Time    Urine Culture - Urine, Urine, Clean Catch [[id number]]  (Abnormal)  (Susceptibility) Collected:  03/09/19 0010    Specimen:  Urine, Clean Catch Updated:  03/11/19 0703     Urine Culture 50,000-60,000 CFU/mL Escherichia coli      10,000-20,000 CFU/mL Mixed Gram Positive Georgina     Comment: Probable contaminant.         Susceptibility      Escherichia coli     PRABHA     Ampicillin Resistant     Ampicillin + Sulbactam Intermediate     Cefazolin Susceptible     Cefepime Susceptible     Ceftriaxone Susceptible     Ertapenem Susceptible     ESBL Confirmation Test Negative     Gentamicin Susceptible     Levofloxacin  Resistant     Meropenem Susceptible     Nitrofurantoin Susceptible     Piperacillin + Tazobactam Susceptible     Trimethoprim + Sulfamethoxazole Resistant                    Basic Metabolic Panel [[id number]]  (Abnormal) Collected:  03/10/19 0459    Specimen:  Blood Updated:  03/10/19 0544     Glucose 121 mg/dL      BUN 48 mg/dL      Creatinine 1.18 mg/dL      Sodium 139 mmol/L      Potassium 4.6 mmol/L      Chloride 99 mmol/L      CO2 27.0 mmol/L      Calcium 9.3 mg/dL      eGFR Non African Amer 47 mL/min/1.73      BUN/Creatinine Ratio 40.7     Anion Gap 13.0 mmol/L     Narrative:       GFR Normal >60  Chronic Kidney Disease <60  Kidney Failure <15    CBC & Differential [[id number]] Collected:  03/10/19 0459    Specimen:  Blood Updated:  03/10/19 0524    Narrative:       The following orders were created for panel order CBC & Differential.  Procedure                               Abnormality         Status                     ---------                               -----------         ------                     CBC Auto Differential[[id number]]        Abnormal            Final result                 Please view results for these tests on the individual orders.    CBC Auto Differential [[id number]]  (Abnormal) Collected:  03/10/19 0459    Specimen:  Blood Updated:  03/10/19 0524     WBC 14.37 10*3/mm3      RBC 4.22 10*6/mm3      Hemoglobin 12.3 g/dL      Hematocrit 37.0 %      MCV 87.7 fL      MCH 29.1 pg      MCHC 33.2 g/dL      RDW 21.0 %      RDW-SD 63.5 fl      MPV 12.7 fL      Platelets 155 10*3/mm3      Neutrophil % 86.4 %      Lymphocyte % 2.1 %      Monocyte % 9.0 %      Eosinophil % 0.1 %      Basophil % 0.3 %      Immature Grans % 2.1 %      Neutrophils, Absolute 12.42 10*3/mm3      Lymphocytes, Absolute 0.30 10*3/mm3      Monocytes, Absolute 1.30 10*3/mm3      Eosinophils, Absolute 0.01 10*3/mm3      Basophils, Absolute 0.04 10*3/mm3      Immature Grans, Absolute 0.30 10*3/mm3      nRBC 0.4 /100 WBC      Comprehensive Metabolic Panel [[id number]]  (Abnormal) Collected:  03/09/19 0521    Specimen:  Blood Updated:  03/09/19 0616     Glucose 100 mg/dL      BUN 48 mg/dL      Creatinine 1.21 mg/dL      Sodium 138 mmol/L      Potassium 4.7 mmol/L      Chloride 98 mmol/L      CO2 23.0 mmol/L      Calcium 9.6 mg/dL      Total Protein 5.7 g/dL      Albumin 3.20 g/dL      ALT (SGPT) 221 U/L      AST (SGOT) 282 U/L      Alkaline Phosphatase 465 U/L      Total Bilirubin 6.4 mg/dL      eGFR Non African Amer 46 mL/min/1.73      Globulin 2.5 gm/dL      A/G Ratio 1.3 g/dL      BUN/Creatinine Ratio 39.7     Anion Gap 17.0 mmol/L     Protime-INR [[id number]]  (Abnormal) Collected:  03/09/19 0521    Specimen:  Blood Updated:  03/09/19 0613     Protime 19.2 Seconds      INR 1.56    CBC (No Diff) [[id number]]  (Abnormal) Collected:  03/09/19 0521    Specimen:  Blood Updated:  03/09/19 0606     WBC 15.98 10*3/mm3      RBC 4.14 10*6/mm3      Hemoglobin 12.1 g/dL      Hematocrit 36.6 %      MCV 88.4 fL      MCH 29.2 pg      MCHC 33.1 g/dL      RDW 20.3 %      RDW-SD 62.4 fl      MPV 13.3 fL      Platelets 176 10*3/mm3     Basic Metabolic Panel [[id number]]  (Abnormal) Collected:  03/09/19 0336    Specimen:  Blood Updated:  03/09/19 0409     Glucose 100 mg/dL      BUN 49 mg/dL      Creatinine 1.16 mg/dL      Sodium 137 mmol/L      Potassium 4.8 mmol/L      Chloride 99 mmol/L      CO2 21.0 mmol/L      Calcium 9.6 mg/dL      eGFR Non African Amer 48 mL/min/1.73      BUN/Creatinine Ratio 42.2     Anion Gap 17.0 mmol/L     Narrative:       GFR Normal >60  Chronic Kidney Disease <60  Kidney Failure <15    CBC Auto Differential [[id number]]  (Abnormal) Collected:  03/09/19 0336    Specimen:  Blood Updated:  03/09/19 0405     WBC 15.94 10*3/mm3      RBC 4.14 10*6/mm3      Hemoglobin 12.5 g/dL      Hematocrit 37.2 %      MCV 89.9 fL      MCH 30.2 pg      MCHC 33.6 g/dL      RDW 20.6 %      RDW-SD 63.9 fl      MPV 12.7 fL      Platelets 187 10*3/mm3       Neutrophil % 86.2 %      Lymphocyte % 2.1 %      Monocyte % 9.5 %      Eosinophil % 0.1 %      Basophil % 0.2 %      Immature Grans % 1.9 %      Neutrophils, Absolute 13.75 10*3/mm3      Lymphocytes, Absolute 0.34 10*3/mm3      Monocytes, Absolute 1.51 10*3/mm3      Eosinophils, Absolute 0.01 10*3/mm3      Basophils, Absolute 0.03 10*3/mm3      Immature Grans, Absolute 0.30 10*3/mm3      nRBC 0.5 /100 WBC     Urinalysis, Microscopic Only - Urine, Clean Catch [[id number]]  (Abnormal) Collected:  03/09/19 0010    Specimen:  Urine, Clean Catch Updated:  03/09/19 0038     RBC, UA 0-2 /HPF      WBC, UA 21-30 /HPF      Bacteria, UA 2+ /HPF      Squamous Epithelial Cells, UA 3-6 /HPF      Hyaline Casts, UA 0-2 /LPF      Methodology Manual Light Microscopy    Urinalysis With Culture If Indicated - Urine, Clean Catch [[id number]]  (Abnormal) Collected:  03/09/19 0010    Specimen:  Urine, Clean Catch Updated:  03/09/19 0038     Color, UA Dark Yellow     Appearance, UA Cloudy     pH, UA 5.5     Specific Gravity, UA 1.024     Glucose, UA Negative     Ketones, UA Trace     Bilirubin, UA Large (3+)     Blood, UA Negative     Protein, UA 30 mg/dL (1+)     Leuk Esterase, UA Large (3+)     Nitrite, UA Positive     Urobilinogen, UA 2.0 E.U./dL    Labelle Draw [[id number]] Collected:  03/08/19 2105    Specimen:  Blood Updated:  03/09/19 0000    Narrative:       The following orders were created for panel order Labelle Draw.  Procedure                               Abnormality         Status                     ---------                               -----------         ------                     Light Blue Top[[id number]]                                   Final result               Green Top (Gel)[[id number]]                                  Final result               Lavender Top[[id number]]                                     Final result               Red Top[[id number]]                                          Final result                  Please view results for these tests on the individual orders.    Green Top (Gel) [[id number]] Collected:  03/08/19 2247    Specimen:  Blood Updated:  03/09/19 0000     Extra Tube Hold for add-ons.     Comment: Auto resulted.       BNP [[id number]]  (Abnormal) Collected:  03/08/19 2246    Specimen:  Blood Updated:  03/08/19 2315     proBNP 3,160.0 pg/mL     Troponin [[id number]]  (Abnormal) Collected:  03/08/19 2246    Specimen:  Blood Updated:  03/08/19 2315     Troponin I 0.045 ng/mL     Lipase [[id number]]  (Abnormal) Collected:  03/08/19 2246    Specimen:  Blood Updated:  03/08/19 2303     Lipase 215 U/L     Comprehensive Metabolic Panel [[id number]]  (Abnormal) Collected:  03/08/19 2246    Specimen:  Blood Updated:  03/08/19 2303     Glucose 118 mg/dL      BUN 48 mg/dL      Creatinine 1.12 mg/dL      Sodium 138 mmol/L      Potassium 5.0 mmol/L      Chloride 100 mmol/L      CO2 23.0 mmol/L      Calcium 9.4 mg/dL      Total Protein 5.8 g/dL      Albumin 3.40 g/dL      ALT (SGPT) 236 U/L      AST (SGOT) 308 U/L      Alkaline Phosphatase 508 U/L      Total Bilirubin 6.2 mg/dL      eGFR Non African Amer 50 mL/min/1.73      Globulin 2.4 gm/dL      A/G Ratio 1.4 g/dL      BUN/Creatinine Ratio 42.9     Anion Gap 15.0 mmol/L     Light Blue Top [[id number]] Collected:  03/08/19 2105    Specimen:  Blood from Hand, Right Updated:  03/08/19 2215     Extra Tube hold for add-on     Comment: Auto resulted       Red Top [[id number]] Collected:  03/08/19 2105    Specimen:  Blood Updated:  03/08/19 2215     Extra Tube Hold for add-ons.     Comment: Auto resulted.       Lavender Top [[id number]] Collected:  03/08/19 2105    Specimen:  Blood Updated:  03/08/19 2215     Extra Tube hold for add-on     Comment: Auto resulted       CBC & Differential [[id number]] Collected:  03/08/19 2105    Specimen:  Blood Updated:  03/08/19 2146    Narrative:       The following orders were created for panel order CBC & Differential.  Procedure             "                   Abnormality         Status                     ---------                               -----------         ------                     CBC Auto Differential[[id number]]        Abnormal            Final result                 Please view results for these tests on the individual orders.    CBC Auto Differential [[id number]]  (Abnormal) Collected:  03/08/19 2105    Specimen:  Blood Updated:  03/08/19 2146     WBC 16.41 10*3/mm3      RBC 4.26 10*6/mm3      Hemoglobin 12.5 g/dL      Hematocrit 37.2 %      MCV 87.3 fL      MCH 29.3 pg      MCHC 33.6 g/dL      RDW 20.2 %      RDW-SD 61.5 fl      MPV 12.7 fL      Platelets 212 10*3/mm3      Neutrophil % 86.4 %      Lymphocyte % 2.1 %      Monocyte % 8.7 %      Eosinophil % 0.0 %      Basophil % 0.4 %      Immature Grans % 2.4 %      Neutrophils, Absolute 14.19 10*3/mm3      Lymphocytes, Absolute 0.34 10*3/mm3      Monocytes, Absolute 1.43 10*3/mm3      Eosinophils, Absolute 0.00 10*3/mm3      Basophils, Absolute 0.06 10*3/mm3      Immature Grans, Absolute 0.39 10*3/mm3      nRBC 0.6 /100 WBC         Hospital Course:  The patient is a 58 y.o. female who presented to [facility] with generalized weakness and failure to thrive.   Her oncologist was consulted.  Her disease noted to have progressed and no further treatment options available.   Hospice consulted.   Patient accepted as patient.  Discharged home with hospice.       Physical Exam on Discharge:  /79 (BP Location: Right arm, Patient Position: Lying)   Pulse 110   Temp 98.4 °F (36.9 °C) (Oral)   Resp 18   Ht 177.8 cm (70\")   Wt 111 kg (244 lb)   LMP  (LMP Unknown)   SpO2 97%   BMI 35.01 kg/m²   Physical Exam   Constitutional: She appears well-developed and well-nourished.   HENT:   Head: Normocephalic and atraumatic.   Eyes: Conjunctivae and EOM are normal. Pupils are equal, round, and reactive to light.   Neck: Neck supple.   Cardiovascular: Normal rate and regular rhythm. " Exam reveals no gallop and no friction rub.   No murmur heard.  Pulmonary/Chest: Effort normal and breath sounds normal.   Abdominal: Soft. Bowel sounds are normal. There is no hepatosplenomegaly.   Musculoskeletal: She exhibits no edema.   Neurological: No cranial nerve deficit.   Arouses to verbal stimuli.   Skin: Skin is warm and dry.   Psychiatric:   Lethargic.    Nursing note and vitals reviewed.        Condition on Discharge:   Unchanged.  Home with hospice.     Discharge Disposition:  Hospice/Medical Facility (DC - External)    Discharge Medications:     Discharge Medications      New Medications      Instructions Start Date   dexamethasone 4 MG tablet  Commonly known as:  DECADRON   4 mg, Oral, 4 Times Daily After Meals & at Bedtime      fentaNYL 25 MCG/HR patch  Commonly known as:  DURAGESIC   1 patch, Transdermal, Every 72 Hours      morphine 100 MG/5ML solution concentrated solution   5 mg, Oral, Every 2 Hours PRN         Continue These Medications      Instructions Start Date   albuterol (2.5 MG/3ML) 0.083% nebulizer solution  Commonly known as:  PROVENTIL   2.5 mg, Nebulization, Every 4 Hours PRN      albuterol sulfate  (90 Base) MCG/ACT inhaler  Commonly known as:  PROVENTIL HFA;VENTOLIN HFA;PROAIR HFA   2 puffs, Inhalation, Every 4 Hours PRN      celecoxib 200 MG capsule  Commonly known as:  CeleBREX   200 mg, Oral, Daily      diclofenac 1 % gel gel  Commonly known as:  VOLTAREN   4 g, Topical, 4 Times Daily PRN      HYDROcodone-acetaminophen  MG per tablet  Commonly known as:  NORCO   1 tablet, Oral, Every 6 Hours PRN      LORazepam 1 MG tablet  Commonly known as:  ATIVAN   1 mg, Oral, Every 8 Hours PRN      losartan 50 MG tablet  Commonly known as:  COZAAR   100 mg, Oral, Daily      polyethylene glycol packet  Commonly known as:  MIRALAX   17 g, Oral, Daily      promethazine 25 MG tablet  Commonly known as:  PHENERGAN   No dose, route, or frequency recorded.      raNITIdine 150 MG  tablet  Commonly known as:  ZANTAC   150 mg, Oral, 2 Times Daily      Sennosides 8.6 MG capsule   Oral, As Needed      sertraline 100 MG tablet  Commonly known as:  ZOLOFT   100 mg, Oral, Daily      sotalol 160 MG tablet  Commonly known as:  BETAPACE   160 mg, Oral, 2 Times Daily      temazepam 30 MG capsule  Commonly known as:  RESTORIL   30 mg, Oral, Nightly PRN      tiZANidine 4 MG tablet  Commonly known as:  ZANAFLEX   4 mg, Oral, Nightly PRN      VITAMIN B COMPLEX PO   Oral           Discharge Diet:   Diet Instructions     Diet: Regular      Discharge Diet:  Regular        Activity at Discharge:   Activity Instructions     Activity as Tolerated            Follow-up Appointments:   Hospice will follow    Test Results Pending at Discharge: none    Alicia Jaquez DO  03/13/19  10:33 AM    Time: 35 minutes

## 2019-03-13 NOTE — PLAN OF CARE
Problem: Patient Care Overview  Goal: Plan of Care Review  Outcome: Ongoing (interventions implemented as appropriate)   03/13/19 1147   Coping/Psychosocial   Plan of Care Reviewed With patient   Plan of Care Review   Progress no change   OTHER   Outcome Summary Pt alert to self, denies pain. Fentanyl patch changed. Turned Q2. Education provided. VSS. Going home with home hospice. Safety maintained.        Problem: Fall Risk (Adult)  Goal: Absence of Fall  Outcome: Ongoing (interventions implemented as appropriate)      Problem: Skin Injury Risk (Adult)  Goal: Skin Health and Integrity  Outcome: Ongoing (interventions implemented as appropriate)      Problem: Nutrition, Imbalanced: Inadequate Oral Intake (Adult)  Goal: Improved Oral Intake  Outcome: Ongoing (interventions implemented as appropriate)    Goal: Prevent Further Weight Loss  Outcome: Ongoing (interventions implemented as appropriate)      Problem: Pain, Acute (Adult)  Goal: Acceptable Pain Control/Comfort Level  Outcome: Ongoing (interventions implemented as appropriate)      Problem: Oncology Care (Adult)  Goal: Signs and Symptoms of Listed Potential Problems Will be Absent, Minimized or Managed (Oncology Care)  Outcome: Ongoing (interventions implemented as appropriate)

## 2019-03-18 LAB
FUNGUS (MYCOLOGY) CULTURE: ABNORMAL
KOH PREP: ABNORMAL
ORGANISM: ABNORMAL
ORGANISM: ABNORMAL

## 2019-03-26 LAB
FUNGUS (MYCOLOGY) CULTURE: NORMAL
KOH PREP: NORMAL

## 2019-03-27 LAB
AFB CULTURE (MYCOBACTERIA): NORMAL
AFB SMEAR: NORMAL

## 2019-04-10 LAB
AFB CULTURE (MYCOBACTERIA): NORMAL
AFB SMEAR: NORMAL

## 2022-07-19 NOTE — TELEPHONE ENCOUNTER
----- Message from Grzegorz Landaverde MD sent at 5/18/2018  2:59 PM CDT -----  Macrodantin 100mg one tab PO BID X 7 days  ----- Message -----  From: Lab, Background User  Sent: 5/17/2018  11:04 AM  To: Grzegorz Landaverde MD         Daughter Pedro Pablo Tinsley contacted, INR scheduled 7/20/22 in lab, prior to previously scheduled x-ray. Pedro Pablo Alvina aware [address] [facility], [address] clinic will call with results/dosing.

## (undated) DEVICE — PK TURNOVER CYSTO RM

## (undated) DEVICE — ENDO KIT,LOURDES HOSPITAL: Brand: MEDLINE INDUSTRIES, INC.

## (undated) DEVICE — ENDOSCOPIC SEAL URO 1 SIZE FITS ALL: Brand: ENDOSCOPIC SEAL

## (undated) DEVICE — SINGLE USE BIOPSY VALVE MAJ-210: Brand: SINGLE USE BIOPSY VALVE (STERILE)

## (undated) DEVICE — FORCEPS BX L100CM DIA1.8MM WRK CHN 2MM PULM S STL RAD JAW 4

## (undated) DEVICE — CVR BRD ARM 13X30

## (undated) DEVICE — GW SENSR DUALFLEX NITNL STR .038IN 3X150CM

## (undated) DEVICE — SINGLE USE SUCTION VALVE MAJ-209: Brand: SINGLE USE SUCTION VALVE (STERILE)

## (undated) DEVICE — PK CYSTO 30

## (undated) DEVICE — NITINOL STONE RETRIEVAL BASKET: Brand: ZERO TIP

## (undated) DEVICE — URETERAL ACCESS SHEATH SET: Brand: NAVIGATOR HD

## (undated) DEVICE — GLV SURG BIOGEL M LTX PF 7 1/2

## (undated) DEVICE — BRONCHOSCOPE CYTOLOGY BRUSH: Brand: COOK

## (undated) DEVICE — FIBR LASR FLEXIVA 200 HIPOWR 1P/U

## (undated) DEVICE — DUAL LUMEN URETERAL CATHETER